# Patient Record
Sex: MALE | Race: WHITE | NOT HISPANIC OR LATINO | Employment: OTHER | URBAN - METROPOLITAN AREA
[De-identification: names, ages, dates, MRNs, and addresses within clinical notes are randomized per-mention and may not be internally consistent; named-entity substitution may affect disease eponyms.]

---

## 2017-03-09 ENCOUNTER — TELEPHONE (OUTPATIENT)
Dept: FAMILY MEDICINE CLINIC | Facility: CLINIC | Age: 67
End: 2017-03-09

## 2017-03-09 RX ORDER — TADALAFIL 20 MG/1
20 TABLET ORAL DAILY PRN
Qty: 90 TABLET | Refills: 6 | Status: SHIPPED | OUTPATIENT
Start: 2017-03-09 | End: 2017-06-20

## 2017-04-03 RX ORDER — TAMSULOSIN HYDROCHLORIDE 0.4 MG/1
CAPSULE ORAL
Qty: 30 CAPSULE | Refills: 0 | Status: SHIPPED | OUTPATIENT
Start: 2017-04-03 | End: 2017-06-20

## 2017-06-20 ENCOUNTER — OFFICE VISIT (OUTPATIENT)
Dept: FAMILY MEDICINE CLINIC | Facility: CLINIC | Age: 67
End: 2017-06-20

## 2017-06-20 VITALS
HEART RATE: 63 BPM | HEIGHT: 73 IN | SYSTOLIC BLOOD PRESSURE: 146 MMHG | DIASTOLIC BLOOD PRESSURE: 90 MMHG | OXYGEN SATURATION: 98 % | TEMPERATURE: 97.4 F | WEIGHT: 198 LBS | BODY MASS INDEX: 26.24 KG/M2

## 2017-06-20 DIAGNOSIS — M25.562 ACUTE PAIN OF LEFT KNEE: Primary | ICD-10-CM

## 2017-06-20 PROCEDURE — 99213 OFFICE O/P EST LOW 20 MIN: CPT | Performed by: PHYSICIAN ASSISTANT

## 2017-06-20 NOTE — PROGRESS NOTES
Subjective   Madi Blanco is a 67 y.o. male    Knee Pain    The incident occurred more than 1 week ago. The incident occurred at the gym. There was no injury mechanism. The pain is present in the left knee. The pain is at a severity of 4/10. The pain is mild. The pain has been improving since onset. Pertinent negatives include no inability to bear weight, loss of motion, loss of sensation, muscle weakness, numbness or tingling. The symptoms are aggravated by movement. He has tried NSAIDs for the symptoms. The treatment provided moderate relief.       The following portions of the patient's history were reviewed and updated as appropriate: allergies, current medications, past social history and problem list    Review of Systems   Constitutional: Negative for fatigue and unexpected weight change.   Respiratory: Negative for cough, chest tightness and shortness of breath.    Cardiovascular: Negative for chest pain, palpitations and leg swelling.   Gastrointestinal: Negative for nausea.   Skin: Negative for color change and rash.   Neurological: Negative for dizziness, tingling, syncope, weakness, numbness and headaches.       Objective     Vitals:    06/20/17 0851   BP: 146/90   Pulse: 63   Temp: 97.4 °F (36.3 °C)   SpO2: 98%       Physical Exam   Constitutional: He appears well-developed and well-nourished.   Neck: Neck supple. No JVD present. No thyromegaly present.   Cardiovascular: Normal rate, regular rhythm, normal heart sounds and intact distal pulses.    Pulmonary/Chest: Effort normal and breath sounds normal.   Abdominal: Soft. Bowel sounds are normal.   Musculoskeletal: He exhibits no edema.        Left knee: Tenderness found. Medial joint line and lateral joint line tenderness noted.   Lymphadenopathy:     He has no cervical adenopathy.   Neurological: No sensory deficit.   Skin: Skin is warm and dry. He is not diaphoretic.   Nursing note and vitals reviewed.      Assessment/Plan     Diagnoses and all  orders for this visit:    Acute pain of left knee    Other orders  -     Naproxen-Esomeprazole (VIMOVO) 500-20 MG tablet delayed-release; Take 500 mg by mouth 2 (Two) Times a Day.  Range of motion exercises given, patient was told ice in the evenings were knee brace if playing basketball or weightbearing exercise follow up if no better

## 2017-07-03 ENCOUNTER — TELEPHONE (OUTPATIENT)
Dept: FAMILY MEDICINE CLINIC | Facility: CLINIC | Age: 67
End: 2017-07-03

## 2017-07-03 DIAGNOSIS — M25.562 ACUTE PAIN OF LEFT KNEE: Primary | ICD-10-CM

## 2017-07-03 NOTE — TELEPHONE ENCOUNTER
----- Message from Jahaira Porter sent at 7/3/2017  2:12 PM EDT -----  Contact: PATIENT  Was in last week and saw Ike for left knee swollen. He said that it's still swollen almost as bad as the day he was here. Wanted to know if he needed to come back and see Ike again or if he wanted to order Xray or MRI. Please call him back and let him know what to do. Thank You.    [phone]

## 2017-07-06 ENCOUNTER — HOSPITAL ENCOUNTER (OUTPATIENT)
Dept: GENERAL RADIOLOGY | Facility: HOSPITAL | Age: 67
Discharge: HOME | End: 2017-07-06
Admitting: PHYSICIAN ASSISTANT

## 2017-07-06 DIAGNOSIS — M25.562 ACUTE PAIN OF LEFT KNEE: ICD-10-CM

## 2017-07-06 PROCEDURE — 73562 X-RAY EXAM OF KNEE 3: CPT

## 2017-07-10 ENCOUNTER — TELEPHONE (OUTPATIENT)
Dept: FAMILY MEDICINE CLINIC | Facility: CLINIC | Age: 67
End: 2017-07-10

## 2017-07-10 NOTE — TELEPHONE ENCOUNTER
Patient called about XR results of knee. Per Ike, he has arthritis and could benefit from steroid shot. Pt informed and scheduled an appointment for this week.

## 2017-07-12 ENCOUNTER — OFFICE VISIT (OUTPATIENT)
Dept: FAMILY MEDICINE CLINIC | Facility: CLINIC | Age: 67
End: 2017-07-12

## 2017-07-12 VITALS
WEIGHT: 200 LBS | SYSTOLIC BLOOD PRESSURE: 162 MMHG | TEMPERATURE: 97.7 F | RESPIRATION RATE: 14 BRPM | HEIGHT: 73 IN | BODY MASS INDEX: 26.51 KG/M2 | DIASTOLIC BLOOD PRESSURE: 88 MMHG | HEART RATE: 70 BPM | OXYGEN SATURATION: 98 %

## 2017-07-12 DIAGNOSIS — M25.562 ACUTE PAIN OF LEFT KNEE: Primary | ICD-10-CM

## 2017-07-12 PROCEDURE — 99212 OFFICE O/P EST SF 10 MIN: CPT | Performed by: PHYSICIAN ASSISTANT

## 2017-07-12 PROCEDURE — 96372 THER/PROPH/DIAG INJ SC/IM: CPT | Performed by: PHYSICIAN ASSISTANT

## 2017-07-12 RX ORDER — METHYLPREDNISOLONE ACETATE 40 MG/ML
40 INJECTION, SUSPENSION INTRA-ARTICULAR; INTRALESIONAL; INTRAMUSCULAR; SOFT TISSUE ONCE
Status: COMPLETED | OUTPATIENT
Start: 2017-07-12 | End: 2017-07-12

## 2017-07-12 RX ADMIN — METHYLPREDNISOLONE ACETATE 40 MG: 40 INJECTION, SUSPENSION INTRA-ARTICULAR; INTRALESIONAL; INTRAMUSCULAR; SOFT TISSUE at 16:16

## 2017-07-13 NOTE — PROGRESS NOTES
Subjective   Madi Blanco is a 67 y.o. male    Knee Pain    Pertinent negatives include no numbness.   Patient is a 67-year-old white male comes in left knee pain, patient has pain with flexion and extension x-rays the left knee showed anterior compartment syndrome patient has pain when sitting standing for long periods of time and will swelling.  Denies any direct injury or trauma to left knee.    The following portions of the patient's history were reviewed and updated as appropriate: allergies, current medications, past social history and problem list    Review of Systems   Constitutional: Negative for appetite change, diaphoresis, fatigue and unexpected weight change.   Eyes: Negative for visual disturbance.   Respiratory: Negative for cough, chest tightness and shortness of breath.    Cardiovascular: Negative for chest pain, palpitations and leg swelling.   Gastrointestinal: Negative for diarrhea, nausea and vomiting.   Endocrine: Negative for polydipsia, polyphagia and polyuria.   Musculoskeletal:        LEFT  KNEE PAIN   Skin: Negative for color change and rash.   Neurological: Negative for dizziness, syncope, weakness, light-headedness, numbness and headaches.       Objective     Vitals:    07/12/17 1425   BP: 162/88   Pulse: 70   Resp: 14   Temp: 97.7 °F (36.5 °C)   SpO2: 98%       Physical Exam   Constitutional: He appears well-developed and well-nourished.   Neck: Neck supple. No JVD present. No thyromegaly present.   Cardiovascular: Normal rate, regular rhythm, normal heart sounds, intact distal pulses and normal pulses.    No murmur heard.  Pulmonary/Chest: Effort normal and breath sounds normal. No respiratory distress.   Abdominal: Soft. Bowel sounds are normal. There is no hepatosplenomegaly. There is no tenderness.   Musculoskeletal: He exhibits no edema.        Legs:  Lymphadenopathy:     He has no cervical adenopathy.   Neurological: No sensory deficit.   Skin: Skin is warm and dry. He is not  diaphoretic.   Nursing note and vitals reviewed.      Assessment/Plan     Diagnoses and all orders for this visit:    Acute pain of left knee  -     methylPREDNISolone acetate (DEPO-medrol) injection 40 mg; Inject 1 mL into the shoulder, thigh, or buttocks 1 (One) Time.    Follow-up after one month to recheck knee

## 2017-10-02 RX ORDER — TAMSULOSIN HYDROCHLORIDE 0.4 MG/1
CAPSULE ORAL
Qty: 30 CAPSULE | Refills: 5 | Status: SHIPPED | OUTPATIENT
Start: 2017-10-02 | End: 2018-10-18 | Stop reason: SDUPTHER

## 2018-10-18 ENCOUNTER — OFFICE VISIT (OUTPATIENT)
Dept: FAMILY MEDICINE CLINIC | Facility: CLINIC | Age: 68
End: 2018-10-18

## 2018-10-18 ENCOUNTER — TELEPHONE (OUTPATIENT)
Dept: FAMILY MEDICINE CLINIC | Facility: CLINIC | Age: 68
End: 2018-10-18

## 2018-10-18 ENCOUNTER — LAB (OUTPATIENT)
Dept: LAB | Facility: HOSPITAL | Age: 68
End: 2018-10-18

## 2018-10-18 VITALS
WEIGHT: 204.2 LBS | SYSTOLIC BLOOD PRESSURE: 162 MMHG | RESPIRATION RATE: 16 BRPM | TEMPERATURE: 97.8 F | BODY MASS INDEX: 27.06 KG/M2 | DIASTOLIC BLOOD PRESSURE: 80 MMHG | HEIGHT: 73 IN | HEART RATE: 63 BPM | OXYGEN SATURATION: 98 %

## 2018-10-18 DIAGNOSIS — Z23 FLU VACCINE NEED: ICD-10-CM

## 2018-10-18 DIAGNOSIS — Z00.00 MEDICARE ANNUAL WELLNESS VISIT, INITIAL: ICD-10-CM

## 2018-10-18 DIAGNOSIS — Z00.00 ROUTINE GENERAL MEDICAL EXAMINATION AT A HEALTH CARE FACILITY: Primary | ICD-10-CM

## 2018-10-18 DIAGNOSIS — Z00.00 ROUTINE GENERAL MEDICAL EXAMINATION AT A HEALTH CARE FACILITY: ICD-10-CM

## 2018-10-18 LAB
ALBUMIN SERPL-MCNC: 4.6 G/DL (ref 3.2–4.8)
ALBUMIN/GLOB SERPL: 2.1 G/DL (ref 1.5–2.5)
ALP SERPL-CCNC: 93 U/L (ref 25–100)
ALT SERPL W P-5'-P-CCNC: 49 U/L (ref 7–40)
ANION GAP SERPL CALCULATED.3IONS-SCNC: 5 MMOL/L (ref 3–11)
ARTICHOKE IGE QN: 163 MG/DL (ref 0–130)
AST SERPL-CCNC: 35 U/L (ref 0–33)
BASOPHILS # BLD AUTO: 0.06 10*3/MM3 (ref 0–0.2)
BASOPHILS NFR BLD AUTO: 1.2 % (ref 0–1)
BILIRUB SERPL-MCNC: 0.7 MG/DL (ref 0.3–1.2)
BUN BLD-MCNC: 13 MG/DL (ref 9–23)
BUN/CREAT SERPL: 13 (ref 7–25)
CALCIUM SPEC-SCNC: 9.5 MG/DL (ref 8.7–10.4)
CHLORIDE SERPL-SCNC: 102 MMOL/L (ref 99–109)
CHOLEST SERPL-MCNC: 227 MG/DL (ref 0–200)
CO2 SERPL-SCNC: 30 MMOL/L (ref 20–31)
CREAT BLD-MCNC: 1 MG/DL (ref 0.6–1.3)
DEPRECATED RDW RBC AUTO: 45.8 FL (ref 37–54)
EOSINOPHIL # BLD AUTO: 0.2 10*3/MM3 (ref 0–0.3)
EOSINOPHIL NFR BLD AUTO: 3.9 % (ref 0–3)
ERYTHROCYTE [DISTWIDTH] IN BLOOD BY AUTOMATED COUNT: 13 % (ref 11.3–14.5)
GFR SERPL CREATININE-BSD FRML MDRD: 75 ML/MIN/1.73
GLOBULIN UR ELPH-MCNC: 2.2 GM/DL
GLUCOSE BLD-MCNC: 94 MG/DL (ref 70–100)
HCT VFR BLD AUTO: 49.7 % (ref 38.9–50.9)
HCV AB SER DONR QL: NORMAL
HDLC SERPL-MCNC: 49 MG/DL (ref 40–60)
HGB BLD-MCNC: 16.7 G/DL (ref 13.1–17.5)
IMM GRANULOCYTES # BLD: 0.02 10*3/MM3 (ref 0–0.03)
IMM GRANULOCYTES NFR BLD: 0.4 % (ref 0–0.6)
LYMPHOCYTES # BLD AUTO: 1.44 10*3/MM3 (ref 0.6–4.8)
LYMPHOCYTES NFR BLD AUTO: 27.9 % (ref 24–44)
MCH RBC QN AUTO: 32.2 PG (ref 27–31)
MCHC RBC AUTO-ENTMCNC: 33.6 G/DL (ref 32–36)
MCV RBC AUTO: 95.9 FL (ref 80–99)
MONOCYTES # BLD AUTO: 0.53 10*3/MM3 (ref 0–1)
MONOCYTES NFR BLD AUTO: 10.3 % (ref 0–12)
NEUTROPHILS # BLD AUTO: 2.93 10*3/MM3 (ref 1.5–8.3)
NEUTROPHILS NFR BLD AUTO: 56.7 % (ref 41–71)
PLATELET # BLD AUTO: 196 10*3/MM3 (ref 150–450)
PMV BLD AUTO: 10.2 FL (ref 6–12)
POTASSIUM BLD-SCNC: 4.6 MMOL/L (ref 3.5–5.5)
PROT SERPL-MCNC: 6.8 G/DL (ref 5.7–8.2)
RBC # BLD AUTO: 5.18 10*6/MM3 (ref 4.2–5.76)
SODIUM BLD-SCNC: 137 MMOL/L (ref 132–146)
TRIGL SERPL-MCNC: 133 MG/DL (ref 0–150)
TSH SERPL DL<=0.05 MIU/L-ACNC: 2.1 MIU/ML (ref 0.35–5.35)
WBC NRBC COR # BLD: 5.16 10*3/MM3 (ref 3.5–10.8)

## 2018-10-18 PROCEDURE — 85025 COMPLETE CBC W/AUTO DIFF WBC: CPT

## 2018-10-18 PROCEDURE — G0438 PPPS, INITIAL VISIT: HCPCS | Performed by: PHYSICIAN ASSISTANT

## 2018-10-18 PROCEDURE — 80061 LIPID PANEL: CPT

## 2018-10-18 PROCEDURE — 36415 COLL VENOUS BLD VENIPUNCTURE: CPT

## 2018-10-18 PROCEDURE — 84443 ASSAY THYROID STIM HORMONE: CPT

## 2018-10-18 PROCEDURE — 86803 HEPATITIS C AB TEST: CPT

## 2018-10-18 PROCEDURE — 80053 COMPREHEN METABOLIC PANEL: CPT

## 2018-10-18 RX ORDER — ASPIRIN 81 MG/1
81 TABLET ORAL DAILY
COMMUNITY
End: 2019-08-28

## 2018-10-18 RX ORDER — TAMSULOSIN HYDROCHLORIDE 0.4 MG/1
1 CAPSULE ORAL DAILY
Qty: 30 CAPSULE | Refills: 5 | Status: SHIPPED | OUTPATIENT
Start: 2018-10-18 | End: 2019-07-05 | Stop reason: SDUPTHER

## 2018-10-18 RX ORDER — LORATADINE 10 MG/1
10 TABLET ORAL DAILY
COMMUNITY
End: 2022-01-17

## 2018-10-18 RX ORDER — FLUTICASONE PROPIONATE 50 MCG
2 SPRAY, SUSPENSION (ML) NASAL DAILY
COMMUNITY
End: 2022-12-16

## 2018-10-18 NOTE — TELEPHONE ENCOUNTER
----- Message from Janae Thompson sent at 10/18/2018 11:04 AM EDT -----  Contact: PT.   PT. SEE'S CASEY.  PT. IS NEEDING A REFILL ON HIS: tamsulosin (FLOMAX) 0.4 MG capsule 24 hr capsule 30 capsule 5 10/2/2017    Sig: TAKE ONE CAPSULE BY MOUTH EVERY DAY   Sent to pharmacy as: TAMSULOSIN 0.4 MG PO capsule 24 hr capsule     RX=[facility] [address] [place], [place] [address]ON [facility] - [phone] [facility] [phone]  [phone] (Phone)  [phone] (Fax)    PT. CAN BE REACHED @ [facility] #.

## 2018-10-18 NOTE — PROGRESS NOTES
QUICK REFERENCE INFORMATION:  The ABCs of the Annual Wellness Visit    Initial Medicare Wellness Visit    HEALTH RISK ASSESSMENT    1950    Recent Hospitalizations:  No hospitalization(s) within the last year..        Current Medical Providers:  Patient Care Team:  Bharathi Bryan MD as PCP - General (Family Medicine)        Smoking Status:  History   Smoking Status   • Former Smoker   • Quit date: 1977   Smokeless Tobacco   • Never Used       Alcohol Consumption:  History   Alcohol Use   • Yes     Comment: Occ       Depression Screen:   PHQ-2/PHQ-9 Depression Screening 10/18/2018   Little interest or pleasure in doing things 0   Feeling down, depressed, or hopeless 0   Total Score 0       Health Habits and Functional and Cognitive Screening:  Functional & Cognitive Status 10/18/2018   Do you have difficulty preparing food and eating? No   Do you have difficulty bathing yourself, getting dressed or grooming yourself? No   Do you have difficulty using the toilet? No   Do you have difficulty moving around from place to place? No   Do you have trouble with steps or getting out of a bed or a chair? No   In the past year have you fallen or experienced a near fall? No   Current Diet Unhealthy Diet   Dental Exam Up to date   Eye Exam Not up to date   Exercise (times per week) 0 times per week   Current Exercise Activities Include None   Do you need help using the phone?  No   Are you deaf or do you have serious difficulty hearing?  No   Do you need help with transportation? No   Do you need help shopping? No   Do you need help preparing meals?  No   Do you need help with housework?  No   Do you need help with laundry? No   Do you need help taking your medications? No   Do you need help managing money? No   Do you ever drive or ride in a car without wearing a seat belt? No   Have you felt unusual stress, anger or loneliness in the last month? No   Who do you live with? Spouse   If you need help, do you have  trouble finding someone available to you? No   Have you been bothered in the last four weeks by sexual problems? No   Do you have difficulty concentrating, remembering or making decisions? No           Does the patient have evidence of cognitive impairment? Yes    Asiprin use counseling: Taking ASA appropriately as indicated      Recent Lab Results:    Visual Acuity:  No exam data present    Age-appropriate Screening Schedule:  Refer to the list below for future screening recommendations based on patient's age, sex and/or medical conditions. Orders for these recommended tests are listed in the plan section. The patient has been provided with a written plan.    Health Maintenance   Topic Date Due   • TDAP/TD VACCINES (1 - Tdap) 03/02/1971   • ZOSTER VACCINE (1 of 2) 03/02/2002   • PNEUMOCOCCAL VACCINES (65+ LOW/MEDIUM RISK) (1 of 2 - PCV13) 03/02/2017   • COLONOSCOPY  12/27/2026   • INFLUENZA VACCINE  Completed        Subjective   History of Present Illness    Madi Blanco is a 68 y.o. male who presents for an Annual Wellness Visit.    The following portions of the patient's history were reviewed and updated as appropriate: allergies, current medications, past family history, past medical history, past social history, past surgical history and problem list.    Outpatient Medications Prior to Visit   Medication Sig Dispense Refill   • tamsulosin (FLOMAX) 0.4 MG capsule 24 hr capsule TAKE ONE CAPSULE BY MOUTH EVERY DAY 30 capsule 5     No facility-administered medications prior to visit.        There is no problem list on file for this patient.      Advance Care Planning:  has NO advance directive - information provided to the patient today    Identification of Risk Factors:  Risk factors include: weight  and cardiovascular risk.    Review of Systems   Constitutional: Negative.    HENT: Negative.    Eyes: Negative.    Respiratory: Negative.    Cardiovascular: Negative.    Gastrointestinal: Negative.    Endocrine:  "Negative.    Genitourinary: Negative.    Musculoskeletal: Negative.    Skin: Negative.    Allergic/Immunologic: Negative.    Neurological: Negative.    Hematological: Negative.    Psychiatric/Behavioral: Negative.    All other systems reviewed and are negative.      Compared to one year ago, the patient feels his physical health is better.  Compared to one year ago, the patient feels his mental health is better.    Objective     Physical Exam   Constitutional: He is oriented to person, place, and time. He appears well-developed and well-nourished.   HENT:   Head: Normocephalic and atraumatic.   Right Ear: External ear normal.   Left Ear: External ear normal.   Nose: Nose normal.   Mouth/Throat: Oropharynx is clear and moist.   Eyes: Pupils are equal, round, and reactive to light. Conjunctivae and EOM are normal.   Neck: Normal range of motion. Neck supple. No thyromegaly present.   Cardiovascular: Normal rate, regular rhythm, normal heart sounds and intact distal pulses.    No murmur heard.  Pulmonary/Chest: Effort normal and breath sounds normal.   Abdominal: Soft. Bowel sounds are normal. He exhibits no mass. There is no tenderness.   Genitourinary: Rectum normal, prostate normal and penis normal.   Musculoskeletal: Normal range of motion. He exhibits no edema.   Neurological: He is alert and oriented to person, place, and time. He has normal reflexes. No cranial nerve deficit.   Skin: Skin is warm and dry.   Psychiatric: He has a normal mood and affect.       Vitals:    10/18/18 0946   BP: 162/80   Pulse: 63   Resp: 16   Temp: 97.8 °F (36.6 °C)   TempSrc: Oral   SpO2: 98%   Weight: 92.6 kg (204 lb 3.2 oz)   Height: 185.4 cm (72.99\")   PainSc:   4   PainLoc: Shoulder       Patient's Body mass index is 26.95 kg/m². BMI is within normal parameters. No follow-up required.      Assessment/Plan   Patient Self-Management and Personalized Health Advice  The patient has been provided with information about: diet and " weight management and preventive services including:   · Advance directive, Influenza vaccine, Zostavax vaccine (Herpes Zoster).    Visit Diagnoses:    ICD-10-CM ICD-9-CM   1. Routine general medical examination at a health care facility Z00.00 V70.0       No orders of the defined types were placed in this encounter.      Outpatient Encounter Prescriptions as of 10/18/2018   Medication Sig Dispense Refill   • aspirin 81 MG EC tablet Take 81 mg by mouth Daily.     • fluticasone (FLONASE) 50 MCG/ACT nasal spray 2 sprays into the nostril(s) as directed by provider Daily.     • loratadine (CLARITIN) 10 MG tablet Take 10 mg by mouth Daily.     • tamsulosin (FLOMAX) 0.4 MG capsule 24 hr capsule TAKE ONE CAPSULE BY MOUTH EVERY DAY 30 capsule 5     No facility-administered encounter medications on file as of 10/18/2018.        Reviewed use of high risk medication in the elderly: yes  Reviewed for potential of harmful drug interactions in the elderly: yes    Follow Up:  No Follow-up on file.     An After Visit Summary and PPPS with all of these plans were given to the patient.

## 2018-11-21 ENCOUNTER — OFFICE VISIT (OUTPATIENT)
Dept: FAMILY MEDICINE CLINIC | Facility: CLINIC | Age: 68
End: 2018-11-21

## 2018-11-21 VITALS
OXYGEN SATURATION: 98 % | SYSTOLIC BLOOD PRESSURE: 156 MMHG | DIASTOLIC BLOOD PRESSURE: 80 MMHG | BODY MASS INDEX: 27.57 KG/M2 | HEIGHT: 73 IN | RESPIRATION RATE: 16 BRPM | WEIGHT: 208 LBS | HEART RATE: 65 BPM

## 2018-11-21 DIAGNOSIS — N40.0 BENIGN PROSTATIC HYPERPLASIA WITHOUT LOWER URINARY TRACT SYMPTOMS: Primary | ICD-10-CM

## 2018-11-21 DIAGNOSIS — L57.0 ACTINIC KERATOSIS: ICD-10-CM

## 2018-11-21 DIAGNOSIS — N52.9 ERECTILE DYSFUNCTION, UNSPECIFIED ERECTILE DYSFUNCTION TYPE: ICD-10-CM

## 2018-11-21 PROCEDURE — 17000 DESTRUCT PREMALG LESION: CPT | Performed by: PHYSICIAN ASSISTANT

## 2018-11-21 PROCEDURE — 99213 OFFICE O/P EST LOW 20 MIN: CPT | Performed by: PHYSICIAN ASSISTANT

## 2018-11-21 NOTE — PROGRESS NOTES
Subjective   Madi Blanco is a 68 y.o. male  Benign Prostatic Hypertrophy (F/U since starting Flomax. ) and Erectile Dysfunction (Req new script for sildenafil-wants to inc to 50mg)      History of Present Illness  Patient is a 68-year-old white male comes in complaining of BPH, needs refill of Flomax meds working improved about 50% having ED symptoms trouble with erection.  Trouble getting keeping an erection takes generic Viagra    Patient claims of scaly lesion on left arm noticed lesion about 3 weeks ago lesion not painful      The following portions of the patient's history were reviewed and updated as appropriate: allergies, current medications, past social history and problem list    Review of Systems   Constitutional: Negative for appetite change, diaphoresis, fatigue and unexpected weight change.   Eyes: Negative for visual disturbance.   Respiratory: Negative for cough, chest tightness and shortness of breath.    Cardiovascular: Negative for chest pain, palpitations and leg swelling.   Gastrointestinal: Negative for diarrhea, nausea and vomiting.   Endocrine: Negative for polydipsia, polyphagia and polyuria.   Skin: Negative for color change and rash.        Scaly lesion   Neurological: Negative for dizziness, syncope, weakness, light-headedness, numbness and headaches.       Objective     Vitals:    11/21/18 0951   BP: 156/80   Pulse: 65   Resp: 16   SpO2: 98%       Physical Exam   Constitutional: He appears well-developed and well-nourished.   Neck: Neck supple. No JVD present. No thyromegaly present.   Cardiovascular: Normal rate, regular rhythm, normal heart sounds, intact distal pulses and normal pulses.   No murmur heard.  Pulmonary/Chest: Effort normal and breath sounds normal. No respiratory distress.   Abdominal: Soft. Bowel sounds are normal. There is no hepatosplenomegaly. There is no tenderness.   Musculoskeletal: He exhibits no edema.   Lymphadenopathy:     He has no cervical adenopathy.    Neurological: No sensory deficit.   Skin: Skin is warm and dry. He is not diaphoretic.        Nursing note and vitals reviewed.      Assessment/Plan     Diagnoses and all orders for this visit:    Benign prostatic hyperplasia without lower urinary tract symptoms    Erectile dysfunction, unspecified erectile dysfunction type    #1 Flomax 0.4 mg 1 by mouth everyday dispense 30    #2 Viagra 25 mg 1-2 by mouth every 6 hours when necessary erectile dysfunction    #3 lesion frozen with liquid nitrogen

## 2018-11-26 ENCOUNTER — TELEPHONE (OUTPATIENT)
Dept: FAMILY MEDICINE CLINIC | Facility: CLINIC | Age: 68
End: 2018-11-26

## 2018-11-26 RX ORDER — SILDENAFIL CITRATE 20 MG/1
20 TABLET ORAL NIGHTLY
Qty: 9 TABLET | Refills: 11 | Status: SHIPPED | OUTPATIENT
Start: 2018-11-26 | End: 2021-10-13

## 2018-11-26 NOTE — TELEPHONE ENCOUNTER
Pt has been on this in the past, but most recently was cialis.  Do you want to refill his sildenafil?

## 2018-11-26 NOTE — TELEPHONE ENCOUNTER
----- Message from Mariela Wayne sent at 11/26/2018 10:25 AM EST -----  Contact: [facility] [phone]  SILDENAFIL 20 MG, REQUESTING REFILL  FAX # [phone]

## 2018-12-06 ENCOUNTER — TELEPHONE (OUTPATIENT)
Dept: FAMILY MEDICINE CLINIC | Facility: CLINIC | Age: 68
End: 2018-12-06

## 2018-12-06 NOTE — TELEPHONE ENCOUNTER
----- Message from Mariela Wayne sent at 12/6/2018 10:52 AM EST -----  Contact: [facility]  PHARMACY CHECKING STATUS ON PENDING RX REQUEST SINCE 11/26/18. DR CHELE BARTHED IT    Contact: [facility] [phone]  SILDENAFIL 20 MG, REQUESTING REFILL  FAX # [phone]

## 2018-12-06 NOTE — TELEPHONE ENCOUNTER
Spoke to [facility], she states the patient signed up for the home delivery. She will call and have the refills transferred from his local [facility] pharmacy

## 2019-07-08 RX ORDER — TAMSULOSIN HYDROCHLORIDE 0.4 MG/1
CAPSULE ORAL
Qty: 30 CAPSULE | Refills: 3 | Status: SHIPPED | OUTPATIENT
Start: 2019-07-08 | End: 2021-10-13 | Stop reason: SDUPTHER

## 2019-08-09 RX ORDER — TAMSULOSIN HYDROCHLORIDE 0.4 MG/1
CAPSULE ORAL
Qty: 30 CAPSULE | Refills: 5 | OUTPATIENT
Start: 2019-08-09

## 2019-08-13 RX ORDER — TAMSULOSIN HYDROCHLORIDE 0.4 MG/1
CAPSULE ORAL
Qty: 30 CAPSULE | Refills: 5 | Status: SHIPPED | OUTPATIENT
Start: 2019-08-13 | End: 2020-09-14 | Stop reason: SDUPTHER

## 2019-08-28 ENCOUNTER — OFFICE VISIT (OUTPATIENT)
Dept: FAMILY MEDICINE CLINIC | Facility: CLINIC | Age: 69
End: 2019-08-28

## 2019-08-28 VITALS
DIASTOLIC BLOOD PRESSURE: 96 MMHG | HEIGHT: 73 IN | OXYGEN SATURATION: 98 % | SYSTOLIC BLOOD PRESSURE: 154 MMHG | HEART RATE: 77 BPM | WEIGHT: 203 LBS | BODY MASS INDEX: 26.9 KG/M2 | RESPIRATION RATE: 16 BRPM

## 2019-08-28 DIAGNOSIS — J40 BRONCHITIS: Primary | ICD-10-CM

## 2019-08-28 PROCEDURE — 99213 OFFICE O/P EST LOW 20 MIN: CPT | Performed by: PHYSICIAN ASSISTANT

## 2019-08-28 RX ORDER — LEVOFLOXACIN 500 MG/1
500 TABLET, FILM COATED ORAL DAILY
Qty: 10 TABLET | Refills: 0 | Status: SHIPPED | OUTPATIENT
Start: 2019-08-28 | End: 2019-08-28 | Stop reason: SDUPTHER

## 2019-08-28 RX ORDER — PREDNISONE 20 MG/1
20 TABLET ORAL 2 TIMES DAILY
Qty: 14 TABLET | Refills: 0 | Status: SHIPPED | OUTPATIENT
Start: 2019-08-28 | End: 2019-08-28 | Stop reason: SDUPTHER

## 2019-08-28 RX ORDER — PREDNISONE 20 MG/1
20 TABLET ORAL 2 TIMES DAILY
Qty: 14 TABLET | Refills: 0 | Status: SHIPPED | OUTPATIENT
Start: 2019-08-28 | End: 2021-10-13

## 2019-08-28 RX ORDER — LEVOFLOXACIN 500 MG/1
500 TABLET, FILM COATED ORAL DAILY
Qty: 10 TABLET | Refills: 0 | Status: SHIPPED | OUTPATIENT
Start: 2019-08-28 | End: 2021-10-13

## 2019-08-28 NOTE — PROGRESS NOTES
Subjective   Madi Blanco is a 69 y.o. male  Cough (Seen in ED Aug 24 for URI and finished Zpak. Still using Flonase and Zyrtec but sx's haven't resolved. Allergic to PCN. )      Cough   This is a new problem. The cough is productive of purulent sputum, productive of brown sputum and productive of sputum. Associated symptoms include ear pain, headaches, postnasal drip, rhinorrhea and wheezing. Pertinent negatives include no chest pain, chills, fever, myalgias, sore throat or shortness of breath. The symptoms are aggravated by lying down. The treatment provided moderate relief.       The following portions of the patient's history were reviewed and updated as appropriate: allergies, current medications, past social history and problem list    Review of Systems   Constitutional: Negative for chills, fatigue and fever.   HENT: Positive for congestion, ear pain, postnasal drip, rhinorrhea and sinus pressure. Negative for sore throat.    Eyes: Positive for discharge and itching. Negative for pain.   Respiratory: Positive for cough, chest tightness and wheezing. Negative for shortness of breath.    Cardiovascular: Negative for chest pain.   Gastrointestinal: Negative.  Negative for nausea.   Genitourinary: Negative.    Musculoskeletal: Negative for myalgias.   Neurological: Positive for light-headedness and headaches. Negative for dizziness.   Hematological: Negative for adenopathy.   Psychiatric/Behavioral: Positive for sleep disturbance.       Objective     Vitals:    08/28/19 1307   BP: 154/96   Pulse: 77   Resp: 16   SpO2: 98%       Physical Exam   Constitutional: He appears well-developed and well-nourished. No distress.   HENT:   Head: Normocephalic and atraumatic.   Right Ear: Tympanic membrane and ear canal normal.   Left Ear: Tympanic membrane and ear canal normal.   Nose: Mucosal edema, rhinorrhea and sinus tenderness present. Right sinus exhibits maxillary sinus tenderness and frontal sinus tenderness. Left  sinus exhibits maxillary sinus tenderness and frontal sinus tenderness.   Mouth/Throat: Oropharynx is clear and moist. No oropharyngeal exudate.   Eyes: Pupils are equal, round, and reactive to light.   Neck: Neck supple. No JVD present.   Cardiovascular: Normal rate, regular rhythm and normal heart sounds.   No murmur heard.  Pulmonary/Chest: Effort normal and breath sounds normal. No stridor. No respiratory distress.   Musculoskeletal: He exhibits no edema.   Lymphadenopathy:     He has no cervical adenopathy.   Skin: He is not diaphoretic.   Nursing note and vitals reviewed.      Assessment/Plan     Diagnoses and all orders for this visit:    Bronchitis  -     levoFLOXacin (LEVAQUIN) 500 MG tablet; Take 1 tablet by mouth Daily.  -     predniSONE (DELTASONE) 20 MG tablet; Take 1 tablet by mouth 2 (Two) Times a Day.    Other orders  -     Discontinue: levoFLOXacin (LEVAQUIN) 500 MG tablet; Take 1 tablet by mouth Daily.  -     Discontinue: predniSONE (DELTASONE) 20 MG tablet; Take 1 tablet by mouth 2 (Two) Times a Day.    folllow up as needed

## 2020-07-31 ENCOUNTER — TELEPHONE (OUTPATIENT)
Dept: FAMILY MEDICINE CLINIC | Facility: CLINIC | Age: 70
End: 2020-07-31

## 2020-08-03 RX ORDER — SILDENAFIL 100 MG/1
100 TABLET, FILM COATED ORAL DAILY PRN
Qty: 20 TABLET | Refills: 0 | Status: SHIPPED | OUTPATIENT
Start: 2020-08-03 | End: 2022-01-17

## 2020-09-14 RX ORDER — TAMSULOSIN HYDROCHLORIDE 0.4 MG/1
1 CAPSULE ORAL DAILY
Qty: 90 CAPSULE | Refills: 1 | Status: SHIPPED | OUTPATIENT
Start: 2020-09-14 | End: 2021-06-08 | Stop reason: SDUPTHER

## 2020-09-14 RX ORDER — TAMSULOSIN HYDROCHLORIDE 0.4 MG/1
CAPSULE ORAL
Qty: 30 CAPSULE | Refills: 2 | OUTPATIENT
Start: 2020-09-14

## 2020-09-14 NOTE — TELEPHONE ENCOUNTER
Caller: Madi Blanco    Relationship: Self    Best call back number:[phone]     Medication needed:   Requested Prescriptions     Pending Prescriptions Disp Refills   • tamsulosin (FLOMAX) 0.4 MG capsule 24 hr capsule 30 capsule 5     Sig: Take 1 capsule by mouth Daily.       When do you need the refill by: TODAY    What details did the patient provide when requesting the medication: PT IS OUT OF MEDICATION    Does the patient have less than a 3 day supply:  [x] Yes  [] No    What is the patient's preferred pharmacy: [facility] - [place], [place] - [address] [address] - [phone]  - [phone]

## 2021-06-08 RX ORDER — TAMSULOSIN HYDROCHLORIDE 0.4 MG/1
CAPSULE ORAL
Qty: 30 CAPSULE | Refills: 0 | Status: SHIPPED | OUTPATIENT
Start: 2021-06-08 | End: 2021-07-01

## 2021-07-02 RX ORDER — TAMSULOSIN HYDROCHLORIDE 0.4 MG/1
1 CAPSULE ORAL DAILY
Qty: 30 CAPSULE | Refills: 0 | Status: SHIPPED | OUTPATIENT
Start: 2021-07-02 | End: 2021-10-13 | Stop reason: SDUPTHER

## 2021-09-03 RX ORDER — TAMSULOSIN HYDROCHLORIDE 0.4 MG/1
1 CAPSULE ORAL DAILY
Qty: 30 CAPSULE | Refills: 0 | OUTPATIENT
Start: 2021-09-03

## 2021-09-29 RX ORDER — TAMSULOSIN HYDROCHLORIDE 0.4 MG/1
1 CAPSULE ORAL DAILY
Qty: 30 CAPSULE | Refills: 0 | OUTPATIENT
Start: 2021-09-29

## 2021-10-13 ENCOUNTER — OFFICE VISIT (OUTPATIENT)
Dept: FAMILY MEDICINE CLINIC | Facility: CLINIC | Age: 71
End: 2021-10-13

## 2021-10-13 VITALS
BODY MASS INDEX: 26.06 KG/M2 | WEIGHT: 196.6 LBS | DIASTOLIC BLOOD PRESSURE: 80 MMHG | HEART RATE: 81 BPM | HEIGHT: 73 IN | OXYGEN SATURATION: 98 % | SYSTOLIC BLOOD PRESSURE: 122 MMHG | RESPIRATION RATE: 15 BRPM | TEMPERATURE: 96.9 F

## 2021-10-13 DIAGNOSIS — Z00.00 MEDICARE ANNUAL WELLNESS VISIT, SUBSEQUENT: Primary | ICD-10-CM

## 2021-10-13 DIAGNOSIS — N40.0 BENIGN PROSTATIC HYPERPLASIA WITHOUT LOWER URINARY TRACT SYMPTOMS: ICD-10-CM

## 2021-10-13 DIAGNOSIS — E78.00 ELEVATED CHOLESTEROL: ICD-10-CM

## 2021-10-13 DIAGNOSIS — Z23 NEED FOR VACCINATION: ICD-10-CM

## 2021-10-13 DIAGNOSIS — Z51.81 MEDICATION MONITORING ENCOUNTER: ICD-10-CM

## 2021-10-13 DIAGNOSIS — L57.0 SOLAR KERATOSIS: ICD-10-CM

## 2021-10-13 DIAGNOSIS — Z12.5 PROSTATE CANCER SCREENING: ICD-10-CM

## 2021-10-13 PROCEDURE — 1159F MED LIST DOCD IN RCRD: CPT | Performed by: FAMILY MEDICINE

## 2021-10-13 PROCEDURE — 1170F FXNL STATUS ASSESSED: CPT | Performed by: FAMILY MEDICINE

## 2021-10-13 PROCEDURE — G0439 PPPS, SUBSEQ VISIT: HCPCS | Performed by: FAMILY MEDICINE

## 2021-10-13 RX ORDER — TAMSULOSIN HYDROCHLORIDE 0.4 MG/1
1 CAPSULE ORAL DAILY
Qty: 90 CAPSULE | Refills: 3 | Status: SHIPPED | OUTPATIENT
Start: 2021-10-13 | End: 2022-03-29

## 2021-10-14 NOTE — PROGRESS NOTES
The ABCs of the Annual Wellness Visit  Subsequent Medicare Wellness Visit    Chief Complaint   Patient presents with   • Medicare Wellness-subsequent      Subjective   History of Present Illness:  Madi Blanco is a 71 y.o. male who presents for a Subsequent Medicare Wellness Visit.    The following portions of the patient's history were reviewed and   updated as appropriate: allergies, current medications, past family history, past medical history, past social history, past surgical history and problem list.    Compared to one year ago, the patient feels his physical   health is the same.    Compared to one year ago, the patient feels his mental   health is the same.    Recent Hospitalizations:  He was not admitted to the hospital during the last year.       Current Medical Providers:  Patient Care Team:  Bharathi Bryan MD as PCP - General (Family Medicine)    Outpatient Medications Prior to Visit   Medication Sig Dispense Refill   • fluticasone (FLONASE) 50 MCG/ACT nasal spray 2 sprays into the nostril(s) as directed by provider Daily.     • loratadine (CLARITIN) 10 MG tablet Take 10 mg by mouth Daily.     • sildenafil (Viagra) 100 MG tablet Take 1 tablet by mouth Daily As Needed for Erectile Dysfunction. 20 tablet 0   • tamsulosin (FLOMAX) 0.4 MG capsule 24 hr capsule Take 1 capsule by mouth once daily 30 capsule 3   • levoFLOXacin (LEVAQUIN) 500 MG tablet Take 1 tablet by mouth Daily. 10 tablet 0   • predniSONE (DELTASONE) 20 MG tablet Take 1 tablet by mouth 2 (Two) Times a Day. 14 tablet 0   • sildenafil (REVATIO) 20 MG tablet Take 1 tablet by mouth Every Night. 9 tablet 11   • tamsulosin (FLOMAX) 0.4 MG capsule 24 hr capsule Take 1 capsule by mouth Daily. MUST HAVE APPT FOR REFILLS 30 capsule 0     No facility-administered medications prior to visit.       No opioid medication identified on active medication list. I have reviewed chart for other potential  high risk medication/s and harmful drug  "interactions in the elderly.          Aspirin is not on active medication list.  Aspirin use is not indicated based on review of current medical condition/s. Risk of harm outweighs potential benefits.  .    Patient Active Problem List   Diagnosis   • Benign prostatic hyperplasia without lower urinary tract symptoms     Advance Care Planning  has NO advanced directive - not interested in additional information    Review of Systems      Objective      Vitals:    10/13/21 1407   BP: 122/80   Pulse: 81   Resp: 15   Temp: 96.9 °F (36.1 °C)   SpO2: 98%   Weight: 89.2 kg (196 lb 9.6 oz)   Height: 185.4 cm (73\")   PainSc:   3   PainLoc: Leg  Comment: right     BMI Readings from Last 1 Encounters:   10/13/21 25.94 kg/m²   BMI is not above normal parameters.     Does the patient have evidence of cognitive impairment? No    Physical Exam            HEALTH RISK ASSESSMENT    Smoking Status:  Social History     Tobacco Use   Smoking Status Former Smoker   • Types: Cigarettes   • Quit date:    • Years since quittin.8   Smokeless Tobacco Never Used     Alcohol Consumption:  Social History     Substance and Sexual Activity   Alcohol Use Yes    Comment: Occ     Fall Risk Screen:    STEADI Fall Risk Assessment was completed, and patient is at HIGH risk for falls. Assessment completed on:10/13/2021    Depression Screening:  PHQ-2/PHQ-9 Depression Screening 10/18/2018   Little interest or pleasure in doing things 0   Feeling down, depressed, or hopeless 0   Total Score 0       Health Habits and Functional and Cognitive Screening:  Functional & Cognitive Status 10/13/2021   Do you have difficulty preparing food and eating? No   Do you have difficulty bathing yourself, getting dressed or grooming yourself? No   Do you have difficulty using the toilet? No   Do you have difficulty moving around from place to place? No   Do you have trouble with steps or getting out of a bed or a chair? No   Current Diet Well Balanced Diet   Dental " Exam Up to date   Eye Exam Up to date   Exercise (times per week) 7 times per week   Current Exercises Include Yard Work;House Cleaning   Current Exercise Activities Include -   Do you need help using the phone?  No   Are you deaf or do you have serious difficulty hearing?  No   Do you need help with transportation? No   Do you need help shopping? No   Do you need help preparing meals?  No   Do you need help with housework?  No   Do you need help with laundry? No   Do you need help taking your medications? No   Do you need help managing money? No   Do you ever drive or ride in a car without wearing a seat belt? No   Have you felt unusual stress, anger or loneliness in the last month? No   Who do you live with? Spouse   If you need help, do you have trouble finding someone available to you? No   Have you been bothered in the last four weeks by sexual problems? No   Do you have difficulty concentrating, remembering or making decisions? No       Age-appropriate Screening Schedule:  Refer to the list below for future screening recommendations based on patient's age, sex and/or medical conditions. Orders for these recommended tests are listed in the plan section. The patient has been provided with a written plan.    Health Maintenance   Topic Date Due   • TDAP/TD VACCINES (1 - Tdap) Never done   • ZOSTER VACCINE (1 of 2) Never done   • LIPID PANEL  10/13/2021   • INFLUENZA VACCINE  Completed              Assessment/Plan     CMS Preventative Services Quick Reference  Risk Factors Identified During Encounter  Immunizations Discussed/Encouraged (specific Immunizations; Pneumococcal 23 and Shingrix  The above risks/problems have been discussed with the patient.  Follow up actions/plans if indicated are seen below in the Assessment/Plan Section.  Pertinent information has been shared with the patient in the After Visit Summary.    Diagnoses and all orders for this visit:    1. Medicare annual wellness visit, subsequent  (Primary)    2. Benign prostatic hyperplasia without lower urinary tract symptoms  -     tamsulosin (FLOMAX) 0.4 MG capsule 24 hr capsule; Take 1 capsule by mouth Daily.  Dispense: 90 capsule; Refill: 3  -     CBC (No Diff); Future  -     Comprehensive Metabolic Panel; Future    3. Solar keratosis  -     Ambulatory Referral to Dermatology    4. Prostate cancer screening  -     PSA Screen; Future    5. Medication monitoring encounter  -     CBC (No Diff); Future  -     Comprehensive Metabolic Panel; Future    6. Elevated cholesterol  -     Comprehensive Metabolic Panel; Future  -     Lipid Panel; Future    7. Need for vaccination  -     Fluzone High-Dose 65+yrs ([id number])        Follow Up:  No follow-ups on file.     An After Visit Summary and PPPS were given to the patient.

## 2021-10-22 ENCOUNTER — LAB (OUTPATIENT)
Dept: LAB | Facility: HOSPITAL | Age: 71
End: 2021-10-22

## 2021-10-22 DIAGNOSIS — Z12.5 PROSTATE CANCER SCREENING: ICD-10-CM

## 2021-10-22 DIAGNOSIS — E78.00 ELEVATED CHOLESTEROL: ICD-10-CM

## 2021-10-22 DIAGNOSIS — Z51.81 MEDICATION MONITORING ENCOUNTER: ICD-10-CM

## 2021-10-22 DIAGNOSIS — N40.0 BENIGN PROSTATIC HYPERPLASIA WITHOUT LOWER URINARY TRACT SYMPTOMS: ICD-10-CM

## 2021-10-22 LAB
DEPRECATED RDW RBC AUTO: 46.9 FL (ref 37–54)
ERYTHROCYTE [DISTWIDTH] IN BLOOD BY AUTOMATED COUNT: 13.2 % (ref 12.3–15.4)
HCT VFR BLD AUTO: 51.4 % (ref 37.5–51)
HGB BLD-MCNC: 17.2 G/DL (ref 13–17.7)
MCH RBC QN AUTO: 32.1 PG (ref 26.6–33)
MCHC RBC AUTO-ENTMCNC: 33.5 G/DL (ref 31.5–35.7)
MCV RBC AUTO: 95.9 FL (ref 79–97)
PLATELET # BLD AUTO: 216 10*3/MM3 (ref 140–450)
PMV BLD AUTO: 10.8 FL (ref 6–12)
RBC # BLD AUTO: 5.36 10*6/MM3 (ref 4.14–5.8)
WBC # BLD AUTO: 4.25 10*3/MM3 (ref 3.4–10.8)

## 2021-10-22 PROCEDURE — G0103 PSA SCREENING: HCPCS

## 2021-10-22 PROCEDURE — 80061 LIPID PANEL: CPT

## 2021-10-22 PROCEDURE — 85027 COMPLETE CBC AUTOMATED: CPT

## 2021-10-22 PROCEDURE — 36415 COLL VENOUS BLD VENIPUNCTURE: CPT

## 2021-10-22 PROCEDURE — 80053 COMPREHEN METABOLIC PANEL: CPT

## 2021-10-23 LAB
ALBUMIN SERPL-MCNC: 4.6 G/DL (ref 3.5–5.2)
ALBUMIN/GLOB SERPL: 1.8 G/DL
ALP SERPL-CCNC: 100 U/L (ref 39–117)
ALT SERPL W P-5'-P-CCNC: 16 U/L (ref 1–41)
ANION GAP SERPL CALCULATED.3IONS-SCNC: 9 MMOL/L (ref 5–15)
AST SERPL-CCNC: 19 U/L (ref 1–40)
BILIRUB SERPL-MCNC: 0.5 MG/DL (ref 0–1.2)
BUN SERPL-MCNC: 13 MG/DL (ref 8–23)
BUN/CREAT SERPL: 12.4 (ref 7–25)
CALCIUM SPEC-SCNC: 9.6 MG/DL (ref 8.6–10.5)
CHLORIDE SERPL-SCNC: 102 MMOL/L (ref 98–107)
CHOLEST SERPL-MCNC: 206 MG/DL (ref 0–200)
CO2 SERPL-SCNC: 28 MMOL/L (ref 22–29)
CREAT SERPL-MCNC: 1.05 MG/DL (ref 0.76–1.27)
GFR SERPL CREATININE-BSD FRML MDRD: 70 ML/MIN/1.73
GLOBULIN UR ELPH-MCNC: 2.5 GM/DL
GLUCOSE SERPL-MCNC: 88 MG/DL (ref 65–99)
HDLC SERPL-MCNC: 47 MG/DL (ref 40–60)
LDLC SERPL CALC-MCNC: 148 MG/DL (ref 0–100)
LDLC/HDLC SERPL: 3.12 {RATIO}
POTASSIUM SERPL-SCNC: 4.4 MMOL/L (ref 3.5–5.2)
PROT SERPL-MCNC: 7.1 G/DL (ref 6–8.5)
PSA SERPL-MCNC: 48.4 NG/ML (ref 0–4)
SODIUM SERPL-SCNC: 139 MMOL/L (ref 136–145)
TRIGL SERPL-MCNC: 62 MG/DL (ref 0–150)
VLDLC SERPL-MCNC: 11 MG/DL (ref 5–40)

## 2021-10-26 DIAGNOSIS — R97.20 ELEVATED PSA, GREATER THAN OR EQUAL TO 20 NG/ML: Primary | ICD-10-CM

## 2021-10-28 ENCOUNTER — TELEPHONE (OUTPATIENT)
Dept: FAMILY MEDICINE CLINIC | Facility: CLINIC | Age: 71
End: 2021-10-28

## 2021-10-28 NOTE — TELEPHONE ENCOUNTER
Caller: Madi Blanco    Relationship: Self    Best call back number: [phone]    What is the best time to reach you: ANYTIME    Who are you requesting to speak with (clinical staff, provider,  specific staff member): PJ    Do you know the name of the person who called:     What was the call regarding: TO DISCUSS PSA LEVEL BEFORE NEUROLOGY APPOINTMENT ON 11/01    Do you require a callback: YES

## 2021-11-01 ENCOUNTER — OFFICE VISIT (OUTPATIENT)
Dept: UROLOGY | Facility: CLINIC | Age: 71
End: 2021-11-01

## 2021-11-01 VITALS — OXYGEN SATURATION: 98 % | HEART RATE: 51 BPM | WEIGHT: 198 LBS | HEIGHT: 73 IN | BODY MASS INDEX: 26.24 KG/M2

## 2021-11-01 DIAGNOSIS — R97.20 ELEVATED PROSTATE SPECIFIC ANTIGEN (PSA): Primary | ICD-10-CM

## 2021-11-01 LAB
BILIRUB BLD-MCNC: NEGATIVE MG/DL
CLARITY, POC: CLEAR
COLOR UR: YELLOW
EXPIRATION DATE: NORMAL
GLUCOSE UR STRIP-MCNC: NEGATIVE MG/DL
KETONES UR QL: NEGATIVE
LEUKOCYTE EST, POC: NEGATIVE
Lab: NORMAL
NITRITE UR-MCNC: NEGATIVE MG/ML
PH UR: 5 [PH] (ref 5–8)
PROT UR STRIP-MCNC: NEGATIVE MG/DL
RBC # UR STRIP: NEGATIVE /UL
SP GR UR: 1.01 (ref 1–1.03)
UROBILINOGEN UR QL: NORMAL

## 2021-11-01 PROCEDURE — 99204 OFFICE O/P NEW MOD 45 MIN: CPT | Performed by: UROLOGY

## 2021-11-01 PROCEDURE — 81003 URINALYSIS AUTO W/O SCOPE: CPT | Performed by: UROLOGY

## 2021-11-01 NOTE — PROGRESS NOTES
Elevated PSA Office Visit      Patient Name: Madi Blanco  : 1950   MRN: [id number]     Chief Complaint:  Elevated PSA.    Chief Complaint   Patient presents with   • Elevated PSA   • Benign Prostatic Hypertrophy       Referring Provider: No ref. provider found    History of Present Illness: Madi Blanco is a 71 y.o. male who presents today with history of elevated PSA.  The patient is otherwise healthy with no significant past medical history.  He denies prior urologic surgeries.  He has had his PSA followed by his last value was in  and was found to be 3.3.  Most recent PSA obtained at an annual physical in 10/2021 was 48.  IPSS is 8 with minimal lower urinary tract symptoms.  Denies dysuria or history of urinary tract infection.  Denies hematuria.  Denies prior urologic evaluation or instrumentation.  Significant family history of prostate cancer.    Subjective      Review of System: Review of Systems   Constitutional: Negative.  Negative for chills, fatigue, fever and unexpected weight change.   HENT: Negative.  Negative for sore throat.    Eyes: Negative.  Negative for visual disturbance.   Respiratory: Negative.  Negative for cough, chest tightness and shortness of breath.    Cardiovascular: Negative.  Negative for chest pain and leg swelling.   Gastrointestinal: Negative.  Negative for blood in stool, constipation, diarrhea, nausea, rectal pain and vomiting.   Genitourinary: Positive for frequency. Negative for decreased urine volume, difficulty urinating, dysuria, enuresis, flank pain, genital sores, hematuria and urgency.   Musculoskeletal: Negative.  Negative for back pain and joint swelling.   Skin: Negative.  Negative for rash and wound.   Neurological: Negative.  Negative for seizures, speech difficulty, weakness and headaches.   Psychiatric/Behavioral: Negative.  Negative for confusion, sleep disturbance and suicidal ideas. The patient is not nervous/anxious.       I have reviewed  the ROS documented by my clinical staff, updated as appropriate and I agree. Catarino Guerrero MD    Past Medical History: History reviewed. No pertinent past medical history.    Past Surgical History:   Past Surgical History:   Procedure Laterality Date   • COLONOSCOPY  2016       Family History:   Family History   Problem Relation Age of Onset   • Lymphoma Mother    • Emphysema Father        Social History:   Social History     Socioeconomic History   • Marital status:    Tobacco Use   • Smoking status: Former Smoker     Types: Cigarettes     Quit date:      Years since quittin.8   • Smokeless tobacco: Never Used   Substance and Sexual Activity   • Alcohol use: Yes     Comment: Occ   • Drug use: No       Medications:     Current Outpatient Medications:   •  fluticasone (FLONASE) 50 MCG/ACT nasal spray, 2 sprays into the nostril(s) as directed by provider Daily., Disp: , Rfl:   •  loratadine (CLARITIN) 10 MG tablet, Take 10 mg by mouth Daily., Disp: , Rfl:   •  sildenafil (Viagra) 100 MG tablet, Take 1 tablet by mouth Daily As Needed for Erectile Dysfunction., Disp: 20 tablet, Rfl: 0  •  tamsulosin (FLOMAX) 0.4 MG capsule 24 hr capsule, Take 1 capsule by mouth Daily., Disp: 90 capsule, Rfl: 3    Allergies:   Allergies   Allergen Reactions   • Penicillins        IPSS Questionnaire (AUA-7):  Over the past month…    1)  Incomplete Emptying  How often have you had a sensation of not emptying your bladder?  0 - Not at all   2)  Frequency  How often have you had to urinate less than every two hours? 2 - Less than half the time   3)  Intermittency  How often have you found you stopped and started again several times when you urinated?  2 - Less than half the time   4) Urgency  How often have you found it difficult to postpone urination?  0 - Not at all   5) Weak Stream  How often have you had a weak urinary stream?  2 - Less than half the time   6) Straining  How often have you had to push or strain to  "begin urination?  0 - Not at all   7) Nocturia  How many times did you typically get up at night to urinate?  2 - 2 times   Total Score:  8       Quality of life due to urinary symptoms:  If you were to spend the rest of your life with your urinary condition the way it is now, how would you feel about that? 1-Pleased   Urine Leakage (Incontinence) 0-No Leakage     Sexual Health Inventory for Men (REBECCA)   Over the past 6 months:     1. How do you rate your confidence that you could get and keep an erection?  3 - Moderate   2. When you had erections with sexual                                     stimulation, how often were your erections hard enough for penetration (entering your partner)?  3 - Sometimes (About half the time)    3. During sexual intercourse, how often were you able to maintain your erection after you had penetrated (entered) your partner?  4 - Most times ( much more than, half the time)   4. During sexual intercourse, how difficult was it to maintain your erection to completion of intercourse?  4 - Sightly difficult    5. When you attempted sexual intercourse, how often was it satisfactory for you?  4 - Most times ( much more than, half the time)    Total Score: 18   The Sexual Health Inventory for Men further classifies ED severity with the following breakpoints:   1-7 (Severe ED) 8-11 (Moderate ED) 12-16 (Mild to Moderate ED) 17-21 (Mild ED)      Post void residual bladder scan:   28mL     Objective     Physical Exam:   Vital Signs:   Vitals:    11/01/21 0833   Pulse: 51   SpO2: 98%   Weight: 89.8 kg (198 lb)   Height: 185.4 cm (73\")   PainSc: 0-No pain     Body mass index is 26.12 kg/m².     Physical Exam  Vitals and nursing note reviewed.   Constitutional:       Appearance: Normal appearance. He is normal weight.   Cardiovascular:      Comments: Well-perfused  Pulmonary:      Effort: Pulmonary effort is normal.   Abdominal:      General: Abdomen is flat.      Palpations: Abdomen is soft. "   Musculoskeletal:         General: Normal range of motion.   Skin:     General: Skin is warm and dry.   Neurological:      General: No focal deficit present.      Mental Status: He is alert and oriented to person, place, and time.   Psychiatric:         Mood and Affect: Mood normal.         Behavior: Behavior normal.         Thought Content: Thought content normal.         Genitourinary  Penis: uncircumcised penis, orthotopic meatus, glans normal, no penile discharge.  No rashes/lesions.    Testes: descended bilaterally, no masses, nontender to palpation. Remainder of scrotal contents normal.  Rectal:  Normal tone, no masses.  Prostate:  35 grams.  Symmetric, non-tender, firm right gland no obvious nodule    Labs:   Hematocrit (%)   Date Value   10/22/2021 51.4 (H)   10/18/2018 49.7   07/05/2016 47.2   06/30/2015 47.2   06/24/2014 44.8       Lab Results   Component Value Date    PSA 48.400 (H) 10/22/2021    PSA 3.300 07/05/2016    PSA 3.10 06/30/2015     I personally viewed the patient's most recent PSA of 48.  The patient has a prior value of 3.3 in 2016 was his last known PSA    Images:   No Images in the past 120 days found..    Measures:   Tobacco:   Madi Blanco  reports that he quit smoking about 44 years ago. His smoking use included cigarettes. He has never used smokeless tobacco.. I have educated him on the risk of diseases from using tobacco products.    Assessment / Plan      Assessment:     Mr. Blanco is a 71 y.o. male with elevated PSA.  The patient has no significant lower urinary tract symptoms.  The patient has a recent PSA of 48.4 in 10/2021.  His last known PSA was 3.3 in 7//2016.    Diagnoses and all orders for this visit:    1. Elevated prostate specific antigen (PSA) (Primary)  -     POC Urinalysis Dipstick, Automated  -     PSA, Total & Free; Future           Patient Education:   Today I discussed with the patient the etiology and management of elevated PSA.  Discussed that PSA is a protein  used as a marker for prostate cancer screening. We discussed in depth the role for prostate cancer screening.   We discussed that over 90% of prostate cancers are detected by screening.  We discussed that screening means a test performed on an asymptomatic patient to detect cancer at an earlier point in time.  We discussed the role of screening which includes both PSA and AQUILES.  We discussed the harms of prostate cancer screening including potential overdiagnosis and overtreatment.  Discussed the benefits of screening including diagnosis of cancer or lower staging grade.  Discussed that prostate imaging is not recommended for prostate cancer screening.  Discussed that screening should be offered to him in of an appropriate age to have a life expectancy more than 10 years.  Discussed that PSA is not capable of diagnosing prostate cancer.  We discussed that a biopsy is indicated if PSA is elevated as a confirmation of cancer.  Discussed the decision to perform a biopsy is often based on many criteria not just a total PSA value.  Discussed that a single abnormal PSA should not prompt biopsy.  Abnormal PSA level should be verified after a few weeks.  We discussed the possible etiologies that can result in an elevated PSA test other test other than cancer.   I evaluated his PSA which was elevated at 48.    We did discuss that this is a significantly elevated value and is concerning.  We also discussed that is significantly different from his prior trend.  His last PSA was in 2016. I discussed that at this time I would like to repeat his PSA to ensure its exact value since he has significant elevation compared to his last value.  I would like to check this on short order and we will re-check in approximately 1 week. If his PSA remains elevated we will further discuss prostate biopsy.  Today we did discuss the procedural steps with regards to the risk and benefits of prostate biopsy.  Patient has no significant family  history of prostate cancer.     Follow Up:   Return in 11 days (on 11/12/2021).    I spent 45 minutes providing clinical care for this patient; including review of patient's chart and provider documentation, face to face time spent with patient in examination room (obtaining history, performing physical exam, discussing diagnosis and management options), placing orders, and completing patient documentation.     Catarino Guerrero MD  [facility] [place]

## 2021-11-11 ENCOUNTER — LAB (OUTPATIENT)
Dept: LAB | Facility: HOSPITAL | Age: 71
End: 2021-11-11

## 2021-11-11 DIAGNOSIS — R97.20 ELEVATED PROSTATE SPECIFIC ANTIGEN (PSA): ICD-10-CM

## 2021-11-11 PROCEDURE — 84153 ASSAY OF PSA TOTAL: CPT

## 2021-11-11 PROCEDURE — 36415 COLL VENOUS BLD VENIPUNCTURE: CPT

## 2021-11-11 PROCEDURE — 84154 ASSAY OF PSA FREE: CPT

## 2021-11-12 LAB
PSA FREE MFR SERPL: 12.4 %
PSA FREE SERPL-MCNC: 6.62 NG/ML
PSA SERPL-MCNC: 53.4 NG/ML (ref 0–4)

## 2021-11-15 ENCOUNTER — OFFICE VISIT (OUTPATIENT)
Dept: UROLOGY | Facility: CLINIC | Age: 71
End: 2021-11-15

## 2021-11-15 VITALS
SYSTOLIC BLOOD PRESSURE: 144 MMHG | HEIGHT: 73 IN | WEIGHT: 198 LBS | DIASTOLIC BLOOD PRESSURE: 92 MMHG | OXYGEN SATURATION: 97 % | HEART RATE: 84 BPM | BODY MASS INDEX: 26.24 KG/M2

## 2021-11-15 DIAGNOSIS — R97.20 ELEVATED PROSTATE SPECIFIC ANTIGEN (PSA): Primary | ICD-10-CM

## 2021-11-15 PROCEDURE — 99214 OFFICE O/P EST MOD 30 MIN: CPT | Performed by: UROLOGY

## 2021-11-15 RX ORDER — CIPROFLOXACIN 750 MG/1
750 TABLET, FILM COATED ORAL 2 TIMES DAILY
Qty: 6 TABLET | Refills: 0 | Status: SHIPPED | OUTPATIENT
Start: 2021-11-15 | End: 2021-11-18

## 2021-11-15 NOTE — PROGRESS NOTES
Elevated PSA Office Visit      Patient Name: Madi Blanco  : 1950   MRN: [id number]     Chief Complaint:  Elevated PSA.    Chief Complaint   Patient presents with   • Elevated PSA       History of Present Illness: Madi Blanco is a 71 y.o. male who presents today with history of elevated PSA.  Patient was previously seen on 2021 for elevated PSA.  The PSA was found to be 48.4.  Based on significant increase from prior trend was recommended we repeat a PSA.  Patient today returns with repeat PSA.      Subjective      Review of System: Review of Systems   Constitutional: Negative for chills, fatigue, fever and unexpected weight change.   HENT: Negative for sore throat.    Eyes: Negative for visual disturbance.   Respiratory: Negative for cough, chest tightness and shortness of breath.    Cardiovascular: Negative for chest pain and leg swelling.   Gastrointestinal: Negative for blood in stool, constipation, diarrhea, nausea, rectal pain and vomiting.   Genitourinary: Negative for decreased urine volume, difficulty urinating, dysuria, enuresis, flank pain, frequency, genital sores, hematuria and urgency.   Musculoskeletal: Negative for back pain and joint swelling.   Skin: Negative for rash and wound.   Neurological: Negative for seizures, speech difficulty, weakness and headaches.   Psychiatric/Behavioral: Negative for confusion, sleep disturbance and suicidal ideas. The patient is not nervous/anxious.       I have reviewed the ROS documented by my clinical staff, updated as appropriate and I agree. Catarino Guerrero MD    Past Medical History: History reviewed. No pertinent past medical history.    Past Surgical History:   Past Surgical History:   Procedure Laterality Date   • COLONOSCOPY  2016       Family History:   Family History   Problem Relation Age of Onset   • Lymphoma Mother    • Emphysema Father        Social History:   Social History     Socioeconomic History   • Marital status:  "   Tobacco Use   • Smoking status: Former Smoker     Types: Cigarettes     Quit date:      Years since quittin.9   • Smokeless tobacco: Never Used   Substance and Sexual Activity   • Alcohol use: Yes     Comment: Occ   • Drug use: No       Medications:     Current Outpatient Medications:   •  fluticasone (FLONASE) 50 MCG/ACT nasal spray, 2 sprays into the nostril(s) as directed by provider Daily., Disp: , Rfl:   •  loratadine (CLARITIN) 10 MG tablet, Take 10 mg by mouth Daily., Disp: , Rfl:   •  sildenafil (Viagra) 100 MG tablet, Take 1 tablet by mouth Daily As Needed for Erectile Dysfunction., Disp: 20 tablet, Rfl: 0  •  tamsulosin (FLOMAX) 0.4 MG capsule 24 hr capsule, Take 1 capsule by mouth Daily., Disp: 90 capsule, Rfl: 3  •  ciprofloxacin (CIPRO) 750 MG tablet, Take 1 tablet by mouth 2 (Two) Times a Day for 3 days. Please take PO BID day before, day of, day after, Disp: 6 tablet, Rfl: 0    Allergies:   Allergies   Allergen Reactions   • Penicillins          Objective     Physical Exam:   Vital Signs:   Vitals:    11/15/21 0808   BP: 144/92   Pulse: 84   SpO2: 97%   Weight: 89.8 kg (198 lb)   Height: 185.4 cm (72.99\")   PainSc: 0-No pain     Body mass index is 26.13 kg/m².     Physical Exam  Vitals and nursing note reviewed.   Constitutional:       Appearance: Normal appearance. He is normal weight.   Musculoskeletal:         General: Normal range of motion.   Skin:     General: Skin is warm and dry.   Neurological:      General: No focal deficit present.      Mental Status: He is alert and oriented to person, place, and time. Mental status is at baseline.   Psychiatric:         Mood and Affect: Mood normal.         Behavior: Behavior normal.         Thought Content: Thought content normal.         Judgment: Judgment normal.         Labs:   Hematocrit (%)   Date Value   10/22/2021 51.4 (H)   10/18/2018 49.7   2016 47.2   2015 47.2   2014 44.8       Lab Results   Component " Value Date    PSA 53.4 (H) 11/11/2021    PSA 48.400 (H) 10/22/2021    PSA 3.300 07/05/2016     Have personally viewed the patient's recent PSA value including a PSA 53.4.    Images:   No Images in the past 120 days found..        Assessment / Plan      Assessment:     Mr. Blanco is a 71 y.o. male with elevated PSA.  Patient was recently evaluated for an elevated PSA of 48.4.  Due to significantly elevated value based upon prior trend repeat PSA was obtained which was found to be 53.4.  He returns today for discussion of elevated PSA.    Diagnoses and all orders for this visit:    1. Elevated prostate specific antigen (PSA) (Primary)  -     ciprofloxacin (CIPRO) 750 MG tablet; Take 1 tablet by mouth 2 (Two) Times a Day for 3 days. Please take PO BID day before, day of, day after  Dispense: 6 tablet; Refill: 0         Plan:   Plan for Prostate Biopsy on 11/22/21.       Patient Education:   Today I discussed with the patient the etiology and management of elevated PSA.  Discussed that PSA is a protein used as a marker for prostate cancer screening. We discussed in depth the role for prostate cancer screening.   We discussed that over 90% of prostate cancers are detected by screening.  We discussed that screening means a test performed on an asymptomatic patient to detect cancer at an earlier point in time.  We discussed the role of screening which includes both PSA and AQUILES.  We discussed the harms of prostate cancer screening including potential overdiagnosis and overtreatment.  Discussed the benefits of screening including diagnosis of cancer or lower staging grade.  Discussed that prostate imaging is not recommended for prostate cancer screening.  Discussed that screening should be offered to him in of an appropriate age to have a life expectancy more than 10 years.  Discussed that PSA is not capable of diagnosing prostate cancer.  We discussed that a biopsy is indicated if PSA is elevated as a confirmation of cancer.   Discussed the decision to perform a biopsy is often based on many criteria not just a total PSA value.  Discussed that a single abnormal PSA should not prompt biopsy.  Abnormal PSA level should be verified after a few weeks.  We discussed the possible etiologies that can result in an elevated PSA test other test other than cancer.   I evaluated his PSA which was elevated at 48 and confirmed elevated at 53.4.  At this time based upon significantly elevated PSA which is confirmed I would recommend biopsy. Today we did discuss the procedural steps with regards to the risk and benefits of prostate biopsy.  We discussed risk of infection after prostate biopsy.  We discussed the indication for antibiotics prior to biopsy. Patient has no significant family history of prostate cancer.  We will schedule biopsy within the next week.    Follow Up:   Return in about 1 week (around 11/22/2021).    I spent 30 minutes providing clinical care for this patient; including review of patient's chart and provider documentation, face to face time spent with patient in examination room (obtaining history, performing physical exam, discussing diagnosis and management options), placing orders, and completing patient documentation.     Catarino Guerrero MD  [facility] [place]

## 2021-11-22 ENCOUNTER — PROCEDURE VISIT (OUTPATIENT)
Dept: UROLOGY | Facility: CLINIC | Age: 71
End: 2021-11-22

## 2021-11-22 VITALS — HEIGHT: 73 IN | HEART RATE: 71 BPM | WEIGHT: 198 LBS | BODY MASS INDEX: 26.24 KG/M2 | OXYGEN SATURATION: 96 %

## 2021-11-22 DIAGNOSIS — R97.20 ELEVATED PROSTATE SPECIFIC ANTIGEN (PSA): Primary | ICD-10-CM

## 2021-11-22 LAB
BILIRUB BLD-MCNC: NEGATIVE MG/DL
CLARITY, POC: CLEAR
COLOR UR: YELLOW
EXPIRATION DATE: ABNORMAL
GLUCOSE UR STRIP-MCNC: NEGATIVE MG/DL
KETONES UR QL: NEGATIVE
LEUKOCYTE EST, POC: NEGATIVE
Lab: ABNORMAL
NITRITE UR-MCNC: NEGATIVE MG/ML
PH UR: 6 [PH] (ref 5–8)
PROT UR STRIP-MCNC: NEGATIVE MG/DL
RBC # UR STRIP: ABNORMAL /UL
SP GR UR: 1.02 (ref 1–1.03)
UROBILINOGEN UR QL: NORMAL

## 2021-11-22 PROCEDURE — 81003 URINALYSIS AUTO W/O SCOPE: CPT | Performed by: UROLOGY

## 2021-11-22 PROCEDURE — 76942 ECHO GUIDE FOR BIOPSY: CPT | Performed by: UROLOGY

## 2021-11-22 PROCEDURE — 55700 PR PROSTATE NEEDLE BIOPSY ANY APPROACH: CPT | Performed by: UROLOGY

## 2021-11-22 NOTE — PROGRESS NOTES
Preprocedure diagnosis  Elevated PSA    Postprocedure diagnosis  Elevated PSA    Procedure  Transrectal ultrasound-guided prostate biopsy, local prostate block with 1% lidocaine injection    Attending surgeon  Catarino Guerrero    Anesthesia  1% Lidocaine prostate block    Complications  None    Indications  71-year-old male presenting for evaluation with elevated PSA who presents today for transrectal ultrasound-guided prostate biopsy after discussion of risks, benefits, alternatives.  Informed consent was obtained.  Patient has taken his ciprofloxacin pre-procedurally and completed an enema the night before his biopsy.    Findings  -Digital rectal examination = firmness to the right prostate no obvious nodule, normal tone, nontender to palpation  -Prebiopsy PSA= 48.4, confirmation PSA 53.4  -Prostate volume measurements = 37.8 cc volume  -Total number of biopsy cores= 12 cores    Procedure   The patient was positioned and prepped in a left lateral position with lower extremities flexed.       A digital rectal exam was performed identifying firmness of the right gland, no obvious nodules, nontender to palpation.    The rectal ultrasound probe was slowly introduced into the rectum without difficulty.  The prostate and seminal vesicles were inspected systematically using axial and sagittal views with the ultrasound.      The dimensions of the prostate were measured occluding width 52.5 mm, height 30.3 mm, length 45.4 mm, for a calculated volume of 37.8.     Next 5 cc of 1% lidocaine was injected at the right and left neurovascular bundles at the junction of the seminal vesicles bilaterally.  Next using a true cut biopsy needle, 12 prostate cores were collected. The specific locations were the following: left lateral base, left lateral mid, left lateral apex, left medial base, left medial mid, and left medial apex, right lateral base, right lateral mid, right lateral apex, right medial base, right medial mid, right  medial apex, and right and left transition zones.  The rectal ultrasound probe was held in place for 2 minutes for hemostasis.  The rectal ultrasound probe was removed.  The patient tolerated the procedure well.     Disposition/Follow Up:     1.  The patient was instructed to drink plenty of fluids and warned about possible complications and side effects including, but not limited to, blood in the urine, stool and semen as well as bloodstream infection.  He was instructed to call the office if there are any issues, especially fevers or flu-like symptoms.    2.  Continue antibiotic for a total of 3 days.  3.  Return 11/30/21 for Pathology Discussion     Catarino Guerrero MD  [facility]

## 2021-11-30 ENCOUNTER — OFFICE VISIT (OUTPATIENT)
Dept: UROLOGY | Facility: CLINIC | Age: 71
End: 2021-11-30

## 2021-11-30 VITALS — BODY MASS INDEX: 26.24 KG/M2 | HEIGHT: 73 IN | OXYGEN SATURATION: 98 % | HEART RATE: 65 BPM | WEIGHT: 198 LBS

## 2021-11-30 DIAGNOSIS — C61 PROSTATE CANCER (HCC): Primary | ICD-10-CM

## 2021-11-30 PROCEDURE — 99215 OFFICE O/P EST HI 40 MIN: CPT | Performed by: UROLOGY

## 2021-11-30 NOTE — PROGRESS NOTES
Prostate Cancer Office Visit      Patient Name: Madi Blanco  : 1950   MRN: [id number]     Chief Complaint:  Prostate Cancer.   Chief Complaint   Patient presents with   • Discussion   • Prostate Cancer       Referring Provider: Bharathi Bryan MD    History of Present Illness: Madi Blanco is a 71 y.o. male who presents today for discussion of recent prostatic biopsy with diagnosis of prostate cancer.  Patient was recently evaluated due to elevation of PSA.  PSA found to be 48.4 in 10/2021.  Recheck in 2021 was 53.4.  The patient underwent prostate biopsy on 2021.  He denies difficulty post biopsy.  Turns today for pathology discussion.  Surgical pathology demonstrated [place] 3+3, grade group 1 prostate cancer in 1 of 2 cores in the right base and mid gland.  Additionally, patient was found to have [place] 4+3, grade group 3 prostate cancer in 2 of 2 cores from the left base, mid, apex.  Additionally there was evidence of a EPE with grade group 3 prostate cancer identified in periprostatic tissue and a core from the left base.  The patient denies systemic symptoms including weight loss, fatigue, bone pain, joint pain.  The patient denies significant lower urinary tract symptoms.  The patient does report history of erectile dysfunction, requiring sildenafil.  Patient denies prior abdominal surgery.  Denies family history of prostate cancer.      Subjective      Review of System: Review of Systems   Constitutional: Negative.  Negative for chills, fatigue, fever and unexpected weight change.   HENT: Negative.  Negative for sore throat.    Eyes: Negative.  Negative for visual disturbance.   Respiratory: Negative.  Negative for cough, chest tightness and shortness of breath.    Cardiovascular: Negative.  Negative for chest pain and leg swelling.   Gastrointestinal: Negative.  Negative for blood in stool, constipation, diarrhea, nausea, rectal pain and vomiting.   Genitourinary: Negative.   Negative for decreased urine volume, difficulty urinating, dysuria, enuresis, flank pain, frequency, genital sores, hematuria and urgency.   Musculoskeletal: Negative.  Negative for back pain and joint swelling.   Skin: Negative.  Negative for rash and wound.   Neurological: Negative.  Negative for seizures, speech difficulty, weakness and headaches.   Psychiatric/Behavioral: Negative.  Negative for confusion, sleep disturbance and suicidal ideas. The patient is not nervous/anxious.       I have reviewed the ROS documented by my clinical staff, I have updated appropriately and I agree. Catarino Guerrero MD    Past Medical History: History reviewed. No pertinent past medical history.    Past Surgical History:   Past Surgical History:   Procedure Laterality Date   • COLONOSCOPY  2016       Family History:   Family History   Problem Relation Age of Onset   • Lymphoma Mother    • Emphysema Father        Social History:   Social History     Socioeconomic History   • Marital status:    Tobacco Use   • Smoking status: Former Smoker     Types: Cigarettes     Quit date:      Years since quittin.9   • Smokeless tobacco: Never Used   Substance and Sexual Activity   • Alcohol use: Yes     Comment: Occ   • Drug use: No       Medications:     Current Outpatient Medications:   •  fluticasone (FLONASE) 50 MCG/ACT nasal spray, 2 sprays into the nostril(s) as directed by provider Daily., Disp: , Rfl:   •  loratadine (CLARITIN) 10 MG tablet, Take 10 mg by mouth Daily., Disp: , Rfl:   •  sildenafil (Viagra) 100 MG tablet, Take 1 tablet by mouth Daily As Needed for Erectile Dysfunction., Disp: 20 tablet, Rfl: 0  •  tamsulosin (FLOMAX) 0.4 MG capsule 24 hr capsule, Take 1 capsule by mouth Daily., Disp: 90 capsule, Rfl: 3    Allergies:   Allergies   Allergen Reactions   • Penicillins           Objective     Physical Exam:   Vital Signs:   Vitals:    21 1608   Pulse: 65   SpO2: 98%   Weight: 89.8 kg (198 lb)  "  Height: 185.4 cm (72.99\")   PainSc: 0-No pain     Body mass index is 26.13 kg/m².     Physical Exam  Vitals and nursing note reviewed.   Constitutional:       Appearance: Normal appearance. He is normal weight.   Cardiovascular:      Comments: Well-perfused  Pulmonary:      Effort: Pulmonary effort is normal.   Abdominal:      General: Abdomen is flat.      Palpations: Abdomen is soft.   Musculoskeletal:         General: Normal range of motion.   Skin:     General: Skin is dry.   Neurological:      General: No focal deficit present.      Mental Status: He is alert and oriented to person, place, and time. Mental status is at baseline.   Psychiatric:         Mood and Affect: Mood normal.         Behavior: Behavior normal.         Thought Content: Thought content normal.         Judgment: Judgment normal.         Labs:   Lab Results   Component Value Date    PSA 53.4 (H) 11/11/2021    PSA 48.400 (H) 10/22/2021    PSA 3.300 07/05/2016       Lab Results   Component Value Date    WBC 4.25 10/22/2021    HGB 17.2 10/22/2021    HCT 51.4 (H) 10/22/2021    MCV 95.9 10/22/2021     10/22/2021       Lab Results   Component Value Date    GLUCOSE 88 10/22/2021    BUN 13 10/22/2021    CREATININE 1.05 10/22/2021    EGFRIFNONA 70 10/22/2021    BCR 12.4 10/22/2021    K 4.4 10/22/2021    CO2 28.0 10/22/2021    CALCIUM 9.6 10/22/2021    ALBUMIN 4.60 10/22/2021    AST 19 10/22/2021    ALT 16 10/22/2021         Assessment / Plan      Assessment:   Mr. Madi Blanco is a 71 y.o. male who presented today with recently diagnosed prostate cancer. Patient was recently evaluated due to elevation of PSA.  PSA found to be 48.4 in 10/2021.  Recheck in 11/2021 was 53.4.  The patient underwent prostate biopsy on 11/22/2021.  He denies difficulty post biopsy.  Turns today for pathology discussion.  Surgical pathology demonstrated [place] 3+3, grade group 1 prostate cancer in 1 of 2 cores in the right base and mid gland.  Additionally, " patient was found to have [place] 4+3, grade group 3 prostate cancer in 2 of 2 cores from the left base, mid, apex, 6 out of 6 cores positive on the left.  Additionally there was evidence of a EPE with grade group 3 prostate cancer identified in periprostatic tissue and a core from the left base.      Diagnoses and all orders for this visit:    1. Prostate cancer (HCC) (Primary)  -     CT Abdomen Pelvis With & Without Contrast; Future  -     NM Bone Scan Whole Body; Future         Patient Education:   Today we discussed the etiology and management of newly diagnosed prostate cancer.  We discussed his biopsy pathology which is reported above.  We discussed the [place] score as well as grading group system.  We discussed that he has significantly elevated PSA with a value of 48.4 and confirmation of 53.4.  Discussed that this is a high risk and concerning feature.  We discussed that we will need to perform imaging to stage his prostate cancer.  We discussed the 2 most common sites of metastasis of the bones and the pelvic lymph nodes.  We discussed that imaging focuses on these areas.  We discussed CT imaging study as well as nuclear medicine bone scan are warranted.  We discussed that at this time he does not have symptoms suggestive of metastatic disease but should still undergo imaging.  We discussed that based on biopsy pathology he is NCCN high risk disease given his PSA is greater than 20.  We discussed that he has grade group 3 disease on biopsied pathology within the left prostate, 6 out of 6 cores positive,  with small focus of grade group 1 disease in the right prostate I did discuss concern regarding his biopsy core that demonstrates EPE with grade group 3 disease in the periprosthetic tissue.  We discussed that imaging studies are required to evaluate for disease outside of the prostate.  If this is identified he will require systemic therapy.  We discussed that if his prostate cancer is localized to the  prostate there are multiple treatment options.  I counseled the patient extensively on the nature of his cancer and the treatment options were discussed including surgical  radiation-based options.  He was given detailed information regarding the short term and long term advantages and disadvantages of each treatment option. Including detailed discussion of the urinary, sexual and bowel complications. We discussed robotic surgery in particular with discussion of the expected perioperative course, risks, complications, and potential side effects.  We also discussed the concept of nerve sparing and the balance between cancer control and erectile function.  Higher to further discussion regarding any localized treatment we need to evaluate for metastatic disease.  We will order CT scan and nuclear medicine bone scan in the next 1 to 2 weeks with a follow-up for discussion of these results.  The patient is understanding agreeable plan of care.    Follow Up:   Return in about 20 days (around 12/20/2021).    I spent 50 minutes providing clinical care for this patient; including review of patient's chart and provider documentation, face to face time spent with patient in examination room (obtaining history, performing physical exam, discussing diagnosis and management options), placing orders, and completing patient documentation.     Catarino Guerrero MD  [facility] [place]

## 2021-12-01 ENCOUNTER — TELEPHONE (OUTPATIENT)
Dept: FAMILY MEDICINE CLINIC | Facility: CLINIC | Age: 71
End: 2021-12-01

## 2021-12-01 NOTE — TELEPHONE ENCOUNTER
His colonoscopy was done in 2016 and no polyps were found.  It was recommended he have a follow-up colonoscopy in 10 years.  That is documented in the note of Dr. Damian who performed the colonoscopy.???

## 2021-12-01 NOTE — TELEPHONE ENCOUNTER
Caller: Madi Blanco    Relationship: Self    Best call back number: [phone] (H)    What orders are you requesting (i.e. lab or imaging): COLONOSCOPY    In what timeframe would the patient need to come in: AS SOON AS POSSIBLE    (OVERDUE OVER 5 YEARS AGO)    Where will you receive your lab/imaging services:  FACILITY    Additional notes:   PATIENT STATES THAT IT HAS BEEN OVER 5 YEARS THAT HE HAD A COLONOSCOPY AND IS REQUESTING AN ORDER TO HAVE ON DONE AS SOON AS POSSIBLE.     PATIENT WILL LIKE A CALL ONCE THE ORDER HAS BEEN PLACED AND APPROVED.       PATIENT HAS BEEN ADVISED TO PLEASE ALLOW 48 HOURS FOR OUR CLINICAL TEAM WILL FOLLOW UP ON THIS REQUEST.

## 2021-12-10 ENCOUNTER — HOSPITAL ENCOUNTER (OUTPATIENT)
Dept: CT IMAGING | Facility: HOSPITAL | Age: 71
Discharge: HOME | End: 2021-12-10
Admitting: UROLOGY

## 2021-12-10 ENCOUNTER — HOSPITAL ENCOUNTER (OUTPATIENT)
Dept: NUCLEAR MEDICINE | Facility: HOSPITAL | Age: 71
Discharge: HOME | End: 2021-12-10

## 2021-12-10 DIAGNOSIS — C61 PROSTATE CANCER (HCC): ICD-10-CM

## 2021-12-10 PROCEDURE — A9503 TC99M MEDRONATE: HCPCS | Performed by: UROLOGY

## 2021-12-10 PROCEDURE — 0 TECHNETIUM MEDRONATE KIT: Performed by: UROLOGY

## 2021-12-10 PROCEDURE — 74178 CT ABD&PLV WO CNTR FLWD CNTR: CPT

## 2021-12-10 PROCEDURE — 78306 BONE IMAGING WHOLE BODY: CPT

## 2021-12-10 PROCEDURE — 25010000002 IOPAMIDOL 61 % SOLUTION: Performed by: UROLOGY

## 2021-12-10 RX ORDER — TC 99M MEDRONATE 20 MG/10ML
27.5 INJECTION, POWDER, LYOPHILIZED, FOR SOLUTION INTRAVENOUS
Status: COMPLETED | OUTPATIENT
Start: 2021-12-10 | End: 2021-12-10

## 2021-12-10 RX ADMIN — Medication 27.5 MILLICURIE: at 10:54

## 2021-12-10 RX ADMIN — IOPAMIDOL 95 ML: 612 INJECTION, SOLUTION INTRAVENOUS at 13:13

## 2021-12-20 ENCOUNTER — OFFICE VISIT (OUTPATIENT)
Dept: UROLOGY | Facility: CLINIC | Age: 71
End: 2021-12-20

## 2021-12-20 VITALS — HEIGHT: 73 IN | BODY MASS INDEX: 26.24 KG/M2 | OXYGEN SATURATION: 97 % | HEART RATE: 66 BPM | WEIGHT: 198 LBS

## 2021-12-20 DIAGNOSIS — C61 PROSTATE CANCER (HCC): Primary | ICD-10-CM

## 2021-12-20 PROCEDURE — 99215 OFFICE O/P EST HI 40 MIN: CPT | Performed by: UROLOGY

## 2021-12-20 NOTE — PROGRESS NOTES
Prostate Cancer Office Visit      Patient Name: Madi Blanco  : 1950   MRN: [id number]     Chief Complaint:  Prostate Cancer.     History of Present Illness: Madi Blanco is a 71 y.o. male who presents today with recently diagnosed prostate cancer.  Patient has high risk prostate cancer with PSA of 48, repeat PSA of 53 prebiopsy.  Surgical prostate biopsy pathology demonstrating grade group 3 in 7 of 7 cores from the left gland.  Perineural invasion identified.  Concern for extraprostatic extension in a left base specimen that included periprosthetic tissue which was positive for disease.  Patient presents after CT abdomen and pelvis and nuclear medicine bone scan has been completed.  The patient denies systemic symptoms including fever, chills, nausea, emesis, weight loss, bone pain.  Staging imaging is negative without evidence of disease within pelvic lymph nodes or bones.  Patient denies significant lower urinary tract symptoms.  Does report a history of erectile dysfunction requiring sildenafil.  Denies prior abdominal surgery.  Denies family history of prostate cancer.    Subjective      Review of System: Review of Systems   Constitutional: Negative for chills, fatigue, fever and unexpected weight change.   HENT: Negative for sore throat.    Eyes: Negative for visual disturbance.   Respiratory: Negative for cough, chest tightness and shortness of breath.    Cardiovascular: Negative for chest pain and leg swelling.   Gastrointestinal: Negative for blood in stool, constipation, diarrhea, nausea, rectal pain and vomiting.   Genitourinary: Negative for decreased urine volume, difficulty urinating, dysuria, enuresis, flank pain, frequency, genital sores, hematuria and urgency.   Musculoskeletal: Negative for back pain and joint swelling.   Skin: Negative for rash and wound.   Neurological: Negative for seizures, speech difficulty, weakness and headaches.   Psychiatric/Behavioral: Negative for  "confusion, sleep disturbance and suicidal ideas. The patient is not nervous/anxious.       I have reviewed the ROS documented by my clinical staff, I have updated appropriately and I agree. Catarino Guerrero MD    Past Medical History: History reviewed. No pertinent past medical history.    Past Surgical History:   Past Surgical History:   Procedure Laterality Date   • COLONOSCOPY  2016       Family History:   Family History   Problem Relation Age of Onset   • Lymphoma Mother    • Emphysema Father        Social History:   Social History     Socioeconomic History   • Marital status:    Tobacco Use   • Smoking status: Former Smoker     Types: Cigarettes     Quit date:      Years since quittin.9   • Smokeless tobacco: Never Used   Substance and Sexual Activity   • Alcohol use: Yes     Comment: Occ   • Drug use: No       Medications:     Current Outpatient Medications:   •  tamsulosin (FLOMAX) 0.4 MG capsule 24 hr capsule, Take 1 capsule by mouth Daily., Disp: 90 capsule, Rfl: 3  •  fluticasone (FLONASE) 50 MCG/ACT nasal spray, 2 sprays into the nostril(s) as directed by provider Daily., Disp: , Rfl:   •  loratadine (CLARITIN) 10 MG tablet, Take 10 mg by mouth Daily., Disp: , Rfl:   •  sildenafil (Viagra) 100 MG tablet, Take 1 tablet by mouth Daily As Needed for Erectile Dysfunction., Disp: 20 tablet, Rfl: 0    Allergies:   Allergies   Allergen Reactions   • Penicillins          Objective     Physical Exam:   Vital Signs:   Vitals:    21 1536   Pulse: 66   SpO2: 97%   Weight: 89.8 kg (198 lb)   Height: 185.4 cm (72.99\")   PainSc: 0-No pain     Body mass index is 26.13 kg/m².     Physical Exam  Vitals and nursing note reviewed.   Constitutional:       Appearance: Normal appearance. He is normal weight.   Pulmonary:      Effort: Pulmonary effort is normal.   Abdominal:      General: Abdomen is flat.      Palpations: Abdomen is soft.   Musculoskeletal:         General: Normal range of motion. "   Skin:     General: Skin is warm and dry.   Neurological:      General: No focal deficit present.      Mental Status: He is alert and oriented to person, place, and time. Mental status is at baseline.   Psychiatric:         Mood and Affect: Mood normal.         Behavior: Behavior normal.         Thought Content: Thought content normal.         Judgment: Judgment normal.         Labs:   Lab Results   Component Value Date    PSA 53.4 (H) 11/11/2021    PSA 48.400 (H) 10/22/2021    PSA 3.300 07/05/2016       Lab Results   Component Value Date    WBC 4.25 10/22/2021    HGB 17.2 10/22/2021    HCT 51.4 (H) 10/22/2021    MCV 95.9 10/22/2021     10/22/2021       Lab Results   Component Value Date    GLUCOSE 88 10/22/2021    BUN 13 10/22/2021    CREATININE 1.05 10/22/2021    EGFRIFNONA 70 10/22/2021    BCR 12.4 10/22/2021    K 4.4 10/22/2021    CO2 28.0 10/22/2021    CALCIUM 9.6 10/22/2021    ALBUMIN 4.60 10/22/2021    AST 19 10/22/2021    ALT 16 10/22/2021       Images:   CT Abdomen Pelvis With & Without Contrast    Result Date: 12/12/2021  No evidence of acute intra-abdominal or intrapelvic disease. In particular, no evidence of metastatic disease is seen.  D:  12/10/2021 E:  12/10/2021  This report was finalized on 12/12/2021 9:31 AM by Dr. Antonio Rosenthal MD.      NM Bone Scan Whole Body    Result Date: 12/12/2021  1. Small focus of activity in the region of the right SI joint that appears to correspond to right SI joint DJD and what appears to be degenerative bony sclerosis on recent CT scan. This would be coincidental for an isolated sclerotic metastatic lesion, but follow-up surveillance of this area might be considered.  2. Trace activity in the inferior angle of the right scapula, favored to be incidental. If the patient has shoulder pain, however, consider follow-up imaging.  3. Otherwise negative nuclear medicine bone scan.  D:  12/10/2021 E:  12/10/2021  This report was finalized on 12/12/2021 10:24 AM by   Antonio Rosenthal MD.      I personally viewed the patient's CT abdomen pelvis as well as bone scan.  No evidence of obvious metastatic disease.    Assessment / Plan      Assessment:   Mr. Madi Blanco is a 71 y.o. male who presented today with recently diagnosed NCCN high risk prostate cancer.  PSA of 48.4 recheck 53.4 resulting in prostate biopsy 11/22/2021.  Surgical prostate pathology demonstrating grade group 3, Javan 4+3 prostate cancer in 7 of 7 cores taken from the left gland.  Additionally there is evidence of VTE with grade group 3 prostate cancer identified in periprosthetic tissue from a core biopsy in the left base.  Denies systemic symptoms including weight loss fatigue bone pain joint pain.  Denies significant urinary tract symptoms.  History of erectile dysfunction requiring PDE 5 inhibitor.  No prior abdominal surgery.  No family history of prostate cancer.    Diagnoses and all orders for this visit:    1. Prostate cancer (HCC) (Primary)  -     [facility]         Patient Education:   I counseled the patient extensively on the nature of his cancer and the treatment options.  We discussed the staging imaging which does not demonstrate concern for metastatic disease at this time.  I did discuss concern based upon his NCCN high risk prostate cancer and elevated PSA volume greater than 50 that we may not be identifying possible metastatic spread.  Discussed his Oncotype study demonstrated a GPS score of 57 which is high risk.  Discussed that this study does have significant concern for 90% chance of biochemical recurrence within 3 years.  We discussed indication for surgical procedure.  We discussed specific risks and benefits including urinary incontinence and erectile dysfunction.  Also discussed referral to radiation oncology for discussion of radiation.  Discussed that he will likely require multi modal therapy with radiation and ADT if this is his preferred  treatment option.  Discussed that he may also require further additional therapies down the line if he has evidence of biochemical recurrence or disease recurrence.  He is understanding agreeable.  Understanding of his pathology and that he has high risk features.  I will refer him to Dr. Remigio Haines of radiation oncology.  At this time the patient does report that he would prefer radiation he will follow-up if he requires fiducial marker placement.  We will continue to follow PSA posttreatment.  The patient is understanding agreeable plan of care    Follow Up:   Return in about 6 weeks (around 1/31/2022) for Recheck.    I spent 45 minutes providing clinical care for this patient; including review of patient's chart and provider documentation, face to face time spent with patient in examination room (obtaining history, performing physical exam, discussing diagnosis and management options), placing orders, and completing patient documentation.     Catarino Guerrero MD  [facility] [place]

## 2022-01-17 ENCOUNTER — HOSPITAL ENCOUNTER (OUTPATIENT)
Dept: RADIATION ONCOLOGY | Facility: HOSPITAL | Age: 72
Setting detail: RADIATION/ONCOLOGY SERIES
Discharge: HOME | End: 2022-01-17

## 2022-01-17 ENCOUNTER — OFFICE VISIT (OUTPATIENT)
Dept: RADIATION ONCOLOGY | Facility: HOSPITAL | Age: 72
End: 2022-01-17

## 2022-01-17 VITALS
RESPIRATION RATE: 20 BRPM | TEMPERATURE: 97.8 F | OXYGEN SATURATION: 97 % | BODY MASS INDEX: 26.78 KG/M2 | HEART RATE: 72 BPM | WEIGHT: 202.9 LBS | SYSTOLIC BLOOD PRESSURE: 189 MMHG | DIASTOLIC BLOOD PRESSURE: 84 MMHG

## 2022-01-17 DIAGNOSIS — C61 PROSTATE CANCER: Primary | ICD-10-CM

## 2022-01-17 PROCEDURE — G0463 HOSPITAL OUTPT CLINIC VISIT: HCPCS

## 2022-01-17 NOTE — PROGRESS NOTES
CONSULTATION NOTE      :                                                          1950  DATE OF CONSULTATION:                       2022   REQUESTING PHYSICIAN:                   Catarino Guerrero MD  REASON FOR CONSULTATION:           Prostate cancer (HCC)  - Stage IIIA (cT2c, cN0, cM0, PSA: 53.4, Grade Group: 3)       BRIEF HISTORY:  The patient is a very pleasant 71 y.o. male  with recently diagnosed prostate cancer.  He presented with a significant increase in PSA from a value of 3.3 ng/ml 5 years ago to 48.4 ng/ml on 10/22/2021.  Repeat PSA 2021 was even higher with value 53.4 ng/ml.  Prostate was moderately enlarged with induration but no nodules.  On ultrasound gland measured 37.8 cc volume.  Biopsy performed 2021 revealed presence of prostatic adenocarcinoma bilaterally.    Javan's 4+3=7, diffusely involving 6 out of 6 cores from the left lobe with tumor occupying 80 to 95% of submitted tissue samples.  There was perineural invasion in all areas.  There was evidence of extraprostatic extension at the left base.  Tyrone's 3+3=6 was found in 2 cores from the right lobe, occupying less than 5% of submitted tissue samples.  Staging imaging studies showed no evidence of metastatic disease.  Nuclear medicine bone scan showed degenerative change in the right SI joint.  CT abdomen pelvis showed no definite evidence of extraprostatic metastasis.  No pathologic lymphadenopathy.    Allergy:   Allergies   Allergen Reactions   • Penicillins Hives       Social History:   Social History     Socioeconomic History   • Marital status:    Tobacco Use   • Smoking status: Former Smoker     Packs/day: 0.50     Types: Cigarettes     Quit date:      Years since quittin.0   • Smokeless tobacco: Never Used   Substance and Sexual Activity   • Alcohol use: Yes     Comment: 2 per day   • Drug use: No       Past Medical History:   Past Medical History:   Diagnosis Date   • Prostate cancer (HCC)         Family History: family history includes Emphysema in his father; Lymphoma in his mother; Stroke in his brother.     Surgical History:   Past Surgical History:   Procedure Laterality Date   • COLONOSCOPY  12/27/2016   • KNEE CARTILAGE SURGERY Left    • PROSTATE BIOPSY          Review of Systems:   Review of Systems   All other systems reviewed and are negative.             IPSS Questionnaire (AUA-7):  Over the past month…    1)  Incomplete Emptying  How often have you had a sensation of not emptying your bladder?  1 - Less than 1 time in 5   2)  Frequency  How often have you had to urinate less than every two hours? 2 - Less than half the time   3)  Intermittency  How often have you found you stopped and started again several times when you urinated?  3 - About half the time   4) Urgency  How often have you found it difficult to postpone urination?  0 - Not at all   5) Weak Stream  How often have you had a weak urinary stream?  2 - Less than half the time   6) Straining  How often have you had to push or strain to begin urination?  0 - Not at all   7) Nocturia  How many times did you typically get up at night to urinate?  3 - 3 times   Total Score:  11       Quality of life due to urinary symptoms:  If you were to spend the rest of your life with your urinary condition the way it is now, how would you feel about that? 0-Delighted   Urine Leakage (Incontinence) 0-No Leakage     Sexual Health Inventory  Current Status    1)  How do you rate your confidence that you could achieve and keep an erection? 2-Low   2) When you had erections with sexual stimulation, how often were your erections hard enough for penetration (entering your partner)? 4-Most times (much more than half the time)   3)  During sexual intercourse, how often were you able to maintain your erection after you had penetrated (entered) into your partner? 5-Almost always or always   4) During sexual intercourse, how difficult was it to maintain your  erection to completion of intercourse? 4-Slightly difficult   5) When you attempted sexual intercourse, how often was it satisfactory to you? 4-Most times (much more than half the time)   Total Score: 24       Bowel Health Inventory  Current Status: 0-No problems, no rectal bleeding, no discharge, less then 5 bowel movements a day           Objective VITAL SGNS:   Vitals:    01/17/22 1300   BP: (!) 189/84   Pulse: 72   Resp: 20   Temp: 97.8 °F (36.6 °C)   TempSrc: Temporal   SpO2: 97%   Weight: 92 kg (202 lb 14.4 oz)        Karnofsky score: 90       Physical Exam:   Physical Exam  Vitals and nursing note reviewed.   Constitutional:       Appearance: He is well-developed.   HENT:      Head: Normocephalic and atraumatic.   Cardiovascular:      Rate and Rhythm: Normal rate and regular rhythm.      Heart sounds: Normal heart sounds. No murmur heard.      Pulmonary:      Effort: Pulmonary effort is normal.      Breath sounds: Normal breath sounds. No wheezing or rales.   Chest:   Breasts:      Right: No supraclavicular adenopathy.      Left: No supraclavicular adenopathy.       Abdominal:      General: Bowel sounds are normal. There is no distension.      Palpations: Abdomen is soft.      Tenderness: There is no abdominal tenderness.   Genitourinary:     Prostate: Enlarged ( 40 cc, induration bilat. more on Left, extending towards base but no definite Seminal Vesicle invasion.). Not tender and no nodules present.      Rectum: No mass, tenderness, external hemorrhoid or internal hemorrhoid. Normal anal tone.   Musculoskeletal:         General: No tenderness. Normal range of motion.      Cervical back: Normal range of motion and neck supple.   Lymphadenopathy:      Cervical: No cervical adenopathy.      Upper Body:      Right upper body: No supraclavicular adenopathy.      Left upper body: No supraclavicular adenopathy.   Skin:     General: Skin is warm and dry.   Neurological:      Mental Status: He is alert and oriented  to person, place, and time.      Sensory: No sensory deficit.   Psychiatric:         Behavior: Behavior normal.         Thought Content: Thought content normal.         Judgment: Judgment normal.              The following portions of the patient's history were reviewed and updated as appropriate: allergies, current medications, past family history, past medical history, past social history, past surgical history and problem list.    Assessment:   Assessment      Prostate cancer, Algona's 4+3=7, clinical stage IIIA (T2c, N0, M0), PSA 53.4 ng/ml.  He has intermediate grade disease extensively involving the left lobe with presence of perineural invasion diffusely and focal evidence of extraprostatic extension of the left base.  PSA value is very high.  Although there is no definite radiographic evidence of metastasis, patient is at higher risk.  Patient is interested in nonsurgical treatment approach.  We reviewed the radiation treatment options.  He would not be a good candidate for stereotactic body radiotherapy or brachytherapy as sole treatment modalities.  He would be a candidate for moderate hypofractionated IMRT radiotherapy and short course androgen ablation with or without focal brachytherapy boost or CyberKnife boost.  Patient to proceed with combined modality hypofractionated IMRT to the pelvis and concurrent androgen ablation.    Informed consent was obtained.    RECOMMENDATIONS: He will return to Dr. Guerrero for placement of gold seed fiducials.  6-month LHRH agonist injection will be arranged.  Patient will subsequently return here for CT simulation.  The prostate gland will receive a dose of 70 Gray delivered in 28 fractions over 5-1/2 weeks using helical Andrew therapy IMRT technique.  Concurrently, the pelvic lymphatics will receive a dose of approximately 50.4 Gray using simultaneous integrated boost technique.    Follow Up:   Return in about 2 weeks (around 1/31/2022) for Office Visit,  Simulation.  Diagnoses and all orders for this visit:    1. Prostate cancer (HCC) (Primary)         Remigio Haines MD

## 2022-02-07 ENCOUNTER — PROCEDURE VISIT (OUTPATIENT)
Dept: UROLOGY | Facility: CLINIC | Age: 72
End: 2022-02-07

## 2022-02-07 VITALS — BODY MASS INDEX: 26.77 KG/M2 | HEART RATE: 67 BPM | OXYGEN SATURATION: 96 % | HEIGHT: 73 IN | WEIGHT: 202 LBS

## 2022-02-07 DIAGNOSIS — C61 PROSTATE CANCER: Primary | ICD-10-CM

## 2022-02-07 LAB
BILIRUB BLD-MCNC: NEGATIVE MG/DL
CLARITY, POC: CLEAR
COLOR UR: YELLOW
EXPIRATION DATE: ABNORMAL
GLUCOSE UR STRIP-MCNC: NEGATIVE MG/DL
KETONES UR QL: NEGATIVE
LEUKOCYTE EST, POC: NEGATIVE
Lab: ABNORMAL
NITRITE UR-MCNC: NEGATIVE MG/ML
PH UR: 6.5 [PH] (ref 5–8)
PROT UR STRIP-MCNC: NEGATIVE MG/DL
RBC # UR STRIP: ABNORMAL /UL
SP GR UR: 1.02 (ref 1–1.03)
UROBILINOGEN UR QL: NORMAL

## 2022-02-07 PROCEDURE — A4648 IMPLANTABLE TISSUE MARKER: HCPCS | Performed by: UROLOGY

## 2022-02-07 PROCEDURE — 76942 ECHO GUIDE FOR BIOPSY: CPT | Performed by: UROLOGY

## 2022-02-07 PROCEDURE — 55876 PLACE RT DEVICE/MARKER PROS: CPT | Performed by: UROLOGY

## 2022-02-07 PROCEDURE — 81003 URINALYSIS AUTO W/O SCOPE: CPT | Performed by: UROLOGY

## 2022-02-07 NOTE — PROGRESS NOTES
Preprocedure diagnosis  Prostate Cancer      Postprocedure diagnosis  Prostate Cancer      Procedure  1. Transrectal Ultrasound Guided Placement of Gold Fiducial Markers  2. Total Number of Markers Placed: 6     Attending surgeon  Catarino Guerrero     Anesthesia  2% lidocaine injected moises-prostatic       Complications  None     Indications  71 year old male with a history of high risk prostate cancer.  Patient was found to have elevated PSA of 48, repeat PSA of 53 prebiopsy.  Prostate biopsy performed demonstrating grade group 3 and 7 out of 7 cores from the left gland with perineural invasion, as well as EPE. Negative staging imaging.  Plan to undergo definitive radiotherapy with Dr. Haines. He presents today for placement of gold fiducial marker to guide radiation therapy planning.  Informed consent was reviewed and signed after discussion of risks, benefits, alternatives.     Findings  Uncomplicated placement of 6 gold fiducial markers, placement performed at left and right lateral base, left and right lateral apex, left and right lateral mid gland.      Procedure  The patient was identified, informed consent was reviewed and signed. Confirmation that the patient has been taking his preprocedure antibiotics was completed.  He was placed in the left lateral position, with knees to chest.  The ultrasound probe was inserted into the patient's rectum.  The prostate was identified.  5 mL of 2% lidocaine was injected along the neurovascular bundle in the left side and a similar  anesthetic injection was performed on the left side.  Starting within the left base of the prostate a marker was placed in the lateral portion of the prostate at the base, mid, apex of the gland. Next, starting in the right base of the prostate a marker was placed in the lateral portion of the prostate at the base, mid, apex of the gland.  A total of 6 gold markers were used.  No significant bleeding was encountered.  The rectal probe was  held in place for 3 minutes to confirm hemostasis.  The rectal probe was removed and there was no significant blood per rectum noted.  The patient tolerated the procedure well denies significant pain or discomfort.     Follow Up:   -Have scheduled 6-month depot Lupron injection 2/16/2022, we have discussed the indication for ADT in the setting of high risk disease in combination with radiotherapy.  This was also discussed by Dr. Remigio Haines.  -F/u with Radiation Oncology as planned for radiation therapy planning  -Return in 3 months with a repeat PSA prior and for continued prostate cancer follow-up  -Return precautions discussed, including significant hematuria, significant blood per rectum, fevers, chills, nausea, vomiting. Patient was instructed to call my office or present to the nearest emergency department with these concerns.

## 2022-02-16 ENCOUNTER — HOSPITAL ENCOUNTER (OUTPATIENT)
Dept: RADIATION ONCOLOGY | Facility: HOSPITAL | Age: 72
Setting detail: RADIATION/ONCOLOGY SERIES
Discharge: HOME | End: 2022-02-16

## 2022-02-16 ENCOUNTER — HOSPITAL ENCOUNTER (OUTPATIENT)
Dept: ONCOLOGY | Facility: HOSPITAL | Age: 72
Setting detail: INFUSION SERIES
Discharge: HOME | End: 2022-02-16

## 2022-02-16 ENCOUNTER — HOSPITAL ENCOUNTER (OUTPATIENT)
Dept: RADIATION ONCOLOGY | Facility: HOSPITAL | Age: 72
Discharge: HOME | End: 2022-02-16

## 2022-02-16 ENCOUNTER — OFFICE VISIT (OUTPATIENT)
Dept: RADIATION ONCOLOGY | Facility: HOSPITAL | Age: 72
End: 2022-02-16

## 2022-02-16 VITALS
HEART RATE: 61 BPM | BODY MASS INDEX: 26.49 KG/M2 | SYSTOLIC BLOOD PRESSURE: 184 MMHG | TEMPERATURE: 97.5 F | DIASTOLIC BLOOD PRESSURE: 91 MMHG | WEIGHT: 199.9 LBS | OXYGEN SATURATION: 96 % | RESPIRATION RATE: 16 BRPM | HEIGHT: 73 IN

## 2022-02-16 VITALS
SYSTOLIC BLOOD PRESSURE: 174 MMHG | BODY MASS INDEX: 26.51 KG/M2 | DIASTOLIC BLOOD PRESSURE: 85 MMHG | TEMPERATURE: 98.5 F | RESPIRATION RATE: 16 BRPM | HEART RATE: 71 BPM | HEIGHT: 73 IN | WEIGHT: 200 LBS

## 2022-02-16 DIAGNOSIS — C61 PROSTATE CANCER: Primary | ICD-10-CM

## 2022-02-16 PROCEDURE — 25010000002 LEUPROLIDE (6 MONTH) PER 7.5 MG: Performed by: UROLOGY

## 2022-02-16 PROCEDURE — G0463 HOSPITAL OUTPT CLINIC VISIT: HCPCS

## 2022-02-16 PROCEDURE — 96402 CHEMO HORMON ANTINEOPL SQ/IM: CPT

## 2022-02-16 PROCEDURE — 96372 THER/PROPH/DIAG INJ SC/IM: CPT

## 2022-02-16 RX ADMIN — LEUPROLIDE ACETATE 45 MG: KIT SUBCUTANEOUS at 13:15

## 2022-02-16 NOTE — PROGRESS NOTES
RE-EVALUATION    PATIENT:                                                      Madi Blanco  :                                                          1950  DATE:                          2022   DIAGNOSIS:     Prostate cancer (HCC)  - Stage IIIA (cT2c, cN0, cM0, PSA: 53.4, Grade Group: 3)         BRIEF HISTORY:  The patient is a very pleasant 71 y.o. male  with clinically localized, higher risk prostate cancer.  He chose to undergo definitive treatment with pelvic irradiation.  He underwent placement of gold seed fiducials last week with minimal post procedure hematuria.  This cleared quickly.  Urinary voiding is back to baseline.  He has had no change in bowel function.  He received a 6-month duration LHRH agonist injection today.  He is here today for simulation.  He has had no other change in health since informed consent was obtained on 2022.  He remains a good candidate for IMRT radiotherapy with concurrent androgen ablation.    Allergies   Allergen Reactions   • Penicillins Hives       Review of Systems   All other systems reviewed and are negative.             IPSS Questionnaire (AUA-7):  Over the past month…     1)  Incomplete Emptying  How often have you had a sensation of not emptying your bladder?  1 - Less than 1 time in 5   2)  Frequency  How often have you had to urinate less than every two hours? 2 - Less than half the time   3)  Intermittency  How often have you found you stopped and started again several times when you urinated?  3 - About half the time   4) Urgency  How often have you found it difficult to postpone urination?  0 - Not at all   5) Weak Stream  How often have you had a weak urinary stream?  2 - Less than half the time   6) Straining  How often have you had to push or strain to begin urination?  0 - Not at all   7) Nocturia  How many times did you typically get up at night to urinate?  3 - 3 times   Total Score:  11         Quality of life due to urinary  "symptoms:  If you were to spend the rest of your life with your urinary condition the way it is now, how would you feel about that? 0-Delighted   Urine Leakage (Incontinence) 0-No Leakage      Sexual Health Inventory  Current Status     1)  How do you rate your confidence that you could achieve and keep an erection? 2-Low   2) When you had erections with sexual stimulation, how often were your erections hard enough for penetration (entering your partner)? 4-Most times (much more than half the time)   3)  During sexual intercourse, how often were you able to maintain your erection after you had penetrated (entered) into your partner? 5-Almost always or always   4) During sexual intercourse, how difficult was it to maintain your erection to completion of intercourse? 4-Slightly difficult   5) When you attempted sexual intercourse, how often was it satisfactory to you? 4-Most times (much more than half the time)   Total Score: 24         Bowel Health Inventory  Current Status: 0-No problems, no rectal bleeding, no discharge, less then 5 bowel movements a day                Objective   VITAL SIGNS:   Vitals:    02/16/22 1404   BP: (!) 184/91   Pulse: 61   Resp: 16   Temp: 97.5 °F (36.4 °C)   SpO2: 96%  Comment: RA   Weight: 90.7 kg (199 lb 14.4 oz)   Height: 185.4 cm (73\")   PainSc: 0-No pain                Physical Exam  Vitals and nursing note reviewed.   Constitutional:       Appearance: He is well-developed.   HENT:      Head: Normocephalic and atraumatic.   Cardiovascular:      Rate and Rhythm: Normal rate and regular rhythm.      Heart sounds: No murmur heard.      Pulmonary:      Effort: Pulmonary effort is normal.      Breath sounds: No wheezing or rales.   Chest:   Breasts:      Right: No supraclavicular adenopathy.      Left: No supraclavicular adenopathy.       Abdominal:      General: There is no distension.      Palpations: Abdomen is soft.      Tenderness: There is no abdominal tenderness. "   Musculoskeletal:         General: No tenderness. Normal range of motion.      Cervical back: Normal range of motion and neck supple.   Lymphadenopathy:      Cervical: No cervical adenopathy.      Upper Body:      Right upper body: No supraclavicular adenopathy.      Left upper body: No supraclavicular adenopathy.   Skin:     General: Skin is warm and dry.   Neurological:      Mental Status: He is alert and oriented to person, place, and time.      Sensory: No sensory deficit.   Psychiatric:         Behavior: Behavior normal.         Thought Content: Thought content normal.         Judgment: Judgment normal.              The following portions of the patient's history were reviewed and updated as appropriate: allergies, current medications, past family history, past medical history, past social history, past surgical history and problem list.    Diagnoses and all orders for this visit:    Prostate cancer (HCC)      IMPRESSION:  Prostate cancer, Javan's 4+3=7, clinical stage IIIA (T2c, N0, M0), PSA 53.4 ng/ml.  He tolerated gold seed fiducial placement well.  He initiated 6-month LHRH agonist androgen suppression.    RECOMMENDATIONS: CT simulation will be performed today.  The prostate gland will receive a dose of 70 Gray delivered in 28 fractions over 5-1/2 weeks.  The pelvic lymphatics will receive a minimum dose of 50 Gray concurrently.       Remigio Haines MD     Approximately 15 minutes was spent with patient and reviewing records.

## 2022-02-22 PROCEDURE — 77338 DESIGN MLC DEVICE FOR IMRT: CPT | Performed by: RADIOLOGY

## 2022-02-22 PROCEDURE — 77300 RADIATION THERAPY DOSE PLAN: CPT | Performed by: RADIOLOGY

## 2022-02-22 PROCEDURE — 77301 RADIOTHERAPY DOSE PLAN IMRT: CPT | Performed by: RADIOLOGY

## 2022-03-09 ENCOUNTER — HOSPITAL ENCOUNTER (OUTPATIENT)
Dept: RADIATION ONCOLOGY | Facility: HOSPITAL | Age: 72
Setting detail: RADIATION/ONCOLOGY SERIES
Discharge: HOME | End: 2022-03-09

## 2022-03-09 ENCOUNTER — HOSPITAL ENCOUNTER (OUTPATIENT)
Dept: RADIATION ONCOLOGY | Facility: HOSPITAL | Age: 72
Discharge: HOME | End: 2022-03-09

## 2022-03-09 VITALS — BODY MASS INDEX: 26.43 KG/M2 | WEIGHT: 200.3 LBS

## 2022-03-09 PROCEDURE — 77385: CPT | Performed by: RADIOLOGY

## 2022-03-10 ENCOUNTER — HOSPITAL ENCOUNTER (OUTPATIENT)
Dept: RADIATION ONCOLOGY | Facility: HOSPITAL | Age: 72
Discharge: HOME | End: 2022-03-10

## 2022-03-10 PROCEDURE — 77385: CPT | Performed by: RADIOLOGY

## 2022-03-11 ENCOUNTER — HOSPITAL ENCOUNTER (OUTPATIENT)
Dept: RADIATION ONCOLOGY | Facility: HOSPITAL | Age: 72
Discharge: HOME | End: 2022-03-11

## 2022-03-11 PROCEDURE — 77336 RADIATION PHYSICS CONSULT: CPT | Performed by: RADIOLOGY

## 2022-03-11 PROCEDURE — 77385: CPT | Performed by: RADIOLOGY

## 2022-03-14 ENCOUNTER — HOSPITAL ENCOUNTER (OUTPATIENT)
Dept: RADIATION ONCOLOGY | Facility: HOSPITAL | Age: 72
Discharge: HOME | End: 2022-03-14

## 2022-03-14 PROCEDURE — 77385: CPT | Performed by: RADIOLOGY

## 2022-03-15 ENCOUNTER — HOSPITAL ENCOUNTER (OUTPATIENT)
Dept: RADIATION ONCOLOGY | Facility: HOSPITAL | Age: 72
Discharge: HOME | End: 2022-03-15

## 2022-03-15 ENCOUNTER — DOCUMENTATION (OUTPATIENT)
Dept: NUTRITION | Facility: HOSPITAL | Age: 72
End: 2022-03-15

## 2022-03-15 VITALS — WEIGHT: 199.5 LBS | BODY MASS INDEX: 26.32 KG/M2

## 2022-03-15 PROCEDURE — 77385: CPT | Performed by: RADIOLOGY

## 2022-03-15 NOTE — PROGRESS NOTES
[facility]    Diagnosis:  Stage IIIA (cT2c, cN0, cM0 prostate cancer / high risk  Radiation:  IMRT radiotherapy with concurrent androgen ablation / 70 Quintero delivered in 28 fractions over 5-1/2 weeks.  The pelvic lymphatics will receive a minimum dose of 50 Gray concurrently - 5/28 fractions completed    Weight 199.5 lbs    Initial consultation with patient providing patient education for possible nutritionally related side effects of treatment including gas, bowel changes.  Stress importance of sustaining weight, focusing on protein intake, hydration to combat fatigue.  Provided written patient education for the Gas Elimination Diet and high protein foods.  Will follow.

## 2022-03-16 ENCOUNTER — HOSPITAL ENCOUNTER (OUTPATIENT)
Dept: RADIATION ONCOLOGY | Facility: HOSPITAL | Age: 72
Discharge: HOME | End: 2022-03-16

## 2022-03-16 PROCEDURE — 77385: CPT | Performed by: RADIOLOGY

## 2022-03-17 ENCOUNTER — HOSPITAL ENCOUNTER (OUTPATIENT)
Dept: RADIATION ONCOLOGY | Facility: HOSPITAL | Age: 72
Discharge: HOME | End: 2022-03-17

## 2022-03-17 PROCEDURE — 77385: CPT | Performed by: RADIOLOGY

## 2022-03-17 PROCEDURE — 77336 RADIATION PHYSICS CONSULT: CPT | Performed by: RADIOLOGY

## 2022-03-18 ENCOUNTER — HOSPITAL ENCOUNTER (OUTPATIENT)
Dept: RADIATION ONCOLOGY | Facility: HOSPITAL | Age: 72
Discharge: HOME | End: 2022-03-18

## 2022-03-18 PROCEDURE — 77385: CPT | Performed by: RADIOLOGY

## 2022-03-21 ENCOUNTER — HOSPITAL ENCOUNTER (OUTPATIENT)
Dept: RADIATION ONCOLOGY | Facility: HOSPITAL | Age: 72
Discharge: HOME | End: 2022-03-21

## 2022-03-21 PROCEDURE — 77385: CPT | Performed by: RADIOLOGY

## 2022-03-22 ENCOUNTER — HOSPITAL ENCOUNTER (OUTPATIENT)
Dept: RADIATION ONCOLOGY | Facility: HOSPITAL | Age: 72
Discharge: HOME | End: 2022-03-22

## 2022-03-22 VITALS — WEIGHT: 197.7 LBS | BODY MASS INDEX: 26.08 KG/M2

## 2022-03-22 PROCEDURE — 77385: CPT | Performed by: RADIOLOGY

## 2022-03-22 RX ORDER — ALFUZOSIN HYDROCHLORIDE 10 MG/1
10 TABLET, EXTENDED RELEASE ORAL DAILY
Qty: 30 TABLET | Refills: 1 | Status: SHIPPED | OUTPATIENT
Start: 2022-03-22 | End: 2022-06-24 | Stop reason: SDUPTHER

## 2022-03-23 ENCOUNTER — HOSPITAL ENCOUNTER (OUTPATIENT)
Dept: RADIATION ONCOLOGY | Facility: HOSPITAL | Age: 72
Discharge: HOME | End: 2022-03-23

## 2022-03-23 PROCEDURE — 77385: CPT | Performed by: RADIOLOGY

## 2022-03-24 ENCOUNTER — HOSPITAL ENCOUNTER (OUTPATIENT)
Dept: RADIATION ONCOLOGY | Facility: HOSPITAL | Age: 72
Discharge: HOME | End: 2022-03-24

## 2022-03-24 PROCEDURE — 77385: CPT | Performed by: RADIOLOGY

## 2022-03-25 ENCOUNTER — HOSPITAL ENCOUNTER (OUTPATIENT)
Dept: RADIATION ONCOLOGY | Facility: HOSPITAL | Age: 72
Discharge: HOME | End: 2022-03-25

## 2022-03-25 PROCEDURE — 77385: CPT | Performed by: RADIOLOGY

## 2022-03-25 PROCEDURE — 77336 RADIATION PHYSICS CONSULT: CPT | Performed by: RADIOLOGY

## 2022-03-28 ENCOUNTER — HOSPITAL ENCOUNTER (OUTPATIENT)
Dept: RADIATION ONCOLOGY | Facility: HOSPITAL | Age: 72
Discharge: HOME | End: 2022-03-28

## 2022-03-28 PROCEDURE — 77385: CPT | Performed by: RADIOLOGY

## 2022-03-29 ENCOUNTER — HOSPITAL ENCOUNTER (OUTPATIENT)
Dept: RADIATION ONCOLOGY | Facility: HOSPITAL | Age: 72
Discharge: HOME | End: 2022-03-29

## 2022-03-29 VITALS — WEIGHT: 200.7 LBS | BODY MASS INDEX: 26.48 KG/M2

## 2022-03-29 PROCEDURE — 77385: CPT | Performed by: RADIOLOGY

## 2022-03-30 ENCOUNTER — HOSPITAL ENCOUNTER (OUTPATIENT)
Dept: RADIATION ONCOLOGY | Facility: HOSPITAL | Age: 72
Discharge: HOME | End: 2022-03-30

## 2022-03-30 PROCEDURE — 77385: CPT | Performed by: RADIOLOGY

## 2022-03-31 ENCOUNTER — HOSPITAL ENCOUNTER (OUTPATIENT)
Dept: RADIATION ONCOLOGY | Facility: HOSPITAL | Age: 72
Discharge: HOME | End: 2022-03-31

## 2022-03-31 PROCEDURE — 77385: CPT | Performed by: RADIOLOGY

## 2022-04-01 ENCOUNTER — HOSPITAL ENCOUNTER (OUTPATIENT)
Dept: RADIATION ONCOLOGY | Facility: HOSPITAL | Age: 72
Discharge: HOME | End: 2022-04-01

## 2022-04-01 ENCOUNTER — HOSPITAL ENCOUNTER (OUTPATIENT)
Dept: RADIATION ONCOLOGY | Facility: HOSPITAL | Age: 72
Setting detail: RADIATION/ONCOLOGY SERIES
Discharge: HOME | End: 2022-04-01

## 2022-04-01 PROCEDURE — 77385: CPT | Performed by: RADIOLOGY

## 2022-04-01 PROCEDURE — 77336 RADIATION PHYSICS CONSULT: CPT | Performed by: RADIOLOGY

## 2022-04-04 ENCOUNTER — HOSPITAL ENCOUNTER (OUTPATIENT)
Dept: RADIATION ONCOLOGY | Facility: HOSPITAL | Age: 72
Discharge: HOME | End: 2022-04-04

## 2022-04-04 PROCEDURE — 77385: CPT | Performed by: RADIOLOGY

## 2022-04-05 ENCOUNTER — HOSPITAL ENCOUNTER (OUTPATIENT)
Dept: RADIATION ONCOLOGY | Facility: HOSPITAL | Age: 72
Discharge: HOME | End: 2022-04-05

## 2022-04-05 VITALS — BODY MASS INDEX: 26.72 KG/M2 | WEIGHT: 202.5 LBS

## 2022-04-05 PROCEDURE — 77385: CPT | Performed by: RADIOLOGY

## 2022-04-06 ENCOUNTER — HOSPITAL ENCOUNTER (OUTPATIENT)
Dept: RADIATION ONCOLOGY | Facility: HOSPITAL | Age: 72
Discharge: HOME | End: 2022-04-06

## 2022-04-06 PROCEDURE — 77385: CPT | Performed by: RADIOLOGY

## 2022-04-07 ENCOUNTER — HOSPITAL ENCOUNTER (OUTPATIENT)
Dept: RADIATION ONCOLOGY | Facility: HOSPITAL | Age: 72
Discharge: HOME | End: 2022-04-07

## 2022-04-07 PROCEDURE — 77385: CPT | Performed by: RADIOLOGY

## 2022-04-07 PROCEDURE — 77336 RADIATION PHYSICS CONSULT: CPT | Performed by: RADIOLOGY

## 2022-04-08 ENCOUNTER — HOSPITAL ENCOUNTER (OUTPATIENT)
Dept: RADIATION ONCOLOGY | Facility: HOSPITAL | Age: 72
Discharge: HOME | End: 2022-04-08

## 2022-04-08 PROCEDURE — 77385: CPT | Performed by: RADIOLOGY

## 2022-04-11 ENCOUNTER — HOSPITAL ENCOUNTER (OUTPATIENT)
Dept: RADIATION ONCOLOGY | Facility: HOSPITAL | Age: 72
Discharge: HOME | End: 2022-04-11

## 2022-04-11 PROCEDURE — 77385: CPT | Performed by: RADIOLOGY

## 2022-04-12 ENCOUNTER — DOCUMENTATION (OUTPATIENT)
Dept: NUTRITION | Facility: HOSPITAL | Age: 72
End: 2022-04-12

## 2022-04-12 ENCOUNTER — HOSPITAL ENCOUNTER (OUTPATIENT)
Dept: RADIATION ONCOLOGY | Facility: HOSPITAL | Age: 72
Discharge: HOME | End: 2022-04-12

## 2022-04-12 VITALS — BODY MASS INDEX: 26.44 KG/M2 | WEIGHT: 200.4 LBS

## 2022-04-12 PROCEDURE — 77385: CPT | Performed by: RADIOLOGY

## 2022-04-12 NOTE — PROGRESS NOTES
[facility]     Diagnosis:  Stage IIIA (cT2c, cN0, cM0 prostate cancer / high risk  Radiation:  IMRT radiotherapy with concurrent androgen ablation / 70 Quintero delivered in 28 fractions over 5-1/2 weeks.  The pelvic lymphatics will receive a minimum dose of 50 Gray concurrently - 25/28 fractions completed     Weight 200.4 lbs    Continues with nocturia frequently throughout the night; given the urination patient education sheet. Experiencing more gas with the progression of treatment; trying to avoid the gas causing foods and bought 250 mg GAS X for management.  Advised taking no more than 2 tablets per day, one in the morning and one in the evening.  Patient with 3 remaining treatments.

## 2022-04-13 ENCOUNTER — HOSPITAL ENCOUNTER (OUTPATIENT)
Dept: RADIATION ONCOLOGY | Facility: HOSPITAL | Age: 72
Discharge: HOME | End: 2022-04-13

## 2022-04-13 PROCEDURE — 77385: CPT | Performed by: RADIOLOGY

## 2022-04-14 ENCOUNTER — HOSPITAL ENCOUNTER (OUTPATIENT)
Dept: RADIATION ONCOLOGY | Facility: HOSPITAL | Age: 72
Discharge: HOME | End: 2022-04-14

## 2022-04-14 PROCEDURE — 77385: CPT | Performed by: RADIOLOGY

## 2022-04-15 ENCOUNTER — HOSPITAL ENCOUNTER (OUTPATIENT)
Dept: RADIATION ONCOLOGY | Facility: HOSPITAL | Age: 72
Discharge: HOME | End: 2022-04-15

## 2022-04-15 PROCEDURE — 77336 RADIATION PHYSICS CONSULT: CPT | Performed by: RADIOLOGY

## 2022-04-18 ENCOUNTER — HOSPITAL ENCOUNTER (OUTPATIENT)
Dept: RADIATION ONCOLOGY | Facility: HOSPITAL | Age: 72
Discharge: HOME | End: 2022-04-18

## 2022-04-18 DIAGNOSIS — C61 PROSTATE CANCER: Primary | ICD-10-CM

## 2022-04-18 PROCEDURE — 77385: CPT | Performed by: RADIOLOGY

## 2022-05-23 ENCOUNTER — HOSPITAL ENCOUNTER (OUTPATIENT)
Dept: RADIATION ONCOLOGY | Facility: HOSPITAL | Age: 72
Setting detail: RADIATION/ONCOLOGY SERIES
Discharge: HOME | End: 2022-05-23

## 2022-05-23 ENCOUNTER — OFFICE VISIT (OUTPATIENT)
Dept: RADIATION ONCOLOGY | Facility: HOSPITAL | Age: 72
End: 2022-05-23

## 2022-05-23 DIAGNOSIS — C61 PROSTATE CANCER: Primary | ICD-10-CM

## 2022-05-23 NOTE — PROGRESS NOTES
TELEMEDICINE FOLLOW UP NOTE    PATIENT:                                                      Madi Blanco  MEDICAL RECORD #:                        [id number]  :                                                          1950  COMPLETION DATE:   2022  DIAGNOSIS:     Prostate cancer (HCC)  - Stage IIIA (cT2c, cN0, cM0, PSA: 53.4, Grade Group: 3)    This visit has been converted to a telehealth virtual visit.  Total time of discussion was 18 minutes.  The patient has given verbal consent.    COVID-19 RISK SCREEN     1. Has the patient had close contact or exposure without PPE with a lab confirmed COVID-19 (+) person or a person under investigation (PUI) for COVID-19 infection?  -- No     2. Has the patient had respiratory symptoms, worsened/new cough and/or SOA, unexplained fever, or sudden loss of smell and/or taste in the past week? --  No    3. Has the patient completed COVID vaccination? --  Unknown         BRIEF HISTORY:    Initial follow-up visit.  He has clinically localized, high risk prostate cancer.  He underwent definitive treatment with a course of combined modality hypofractionated IMRT to the pelvis and concurrent androgen ablation.  The prostate gland received a dose of 70 Gray delivered in 28 fractions.  Concurrently, the pelvic lymphatics received a minimum dose of 50.4 Gray.  He tolerated treatment well.  He developed brief dysuria, which resolved after about 1 week with use of OTC Azo.  He reports chronic moderate outflow obstructive symptoms were acutely exacerbated, citing nocturia every hour and increased urinary urgency as predominant symptoms.  He reports symptoms are overall improving, and strength of urine stream now stronger.  He reports switching back from alfuzosin to longstanding use of tamsulosin nightly.  No hematuria.  No incontinence.  He developed frequent, small volume stools, which have resolved.  He reports bowel function is normal at this point.  He did develop mild  posttreatment fatigue, which he believes is in part due to frequent nocturia.  He is tolerating hormone therapy despite frequent, tolerable hot flashes.  No new aches or pains.  Overall, he feels well.      IPSS Questionnaire (AUA-7):  Over the past month…    1)  Incomplete Emptying  How often have you had a sensation of not emptying your bladder?  2 - Less than half the time   2)  Frequency  How often have you had to urinate less than every two hours? 3 - About half the time   3)  Intermittency  How often have you found you stopped and started again several times when you urinated?  3 - About half the time   4) Urgency  How often have you found it difficult to postpone urination?  4 - More than half the time   5) Weak Stream  How often have you had a weak urinary stream?  2 - Less than half the time   6) Straining  How often have you had to push or strain to begin urination?  0 - Not at all   7) Nocturia  How many times did you typically get up at night to urinate?  5 - 5+ times   Total Score:  19       Quality of life due to urinary symptoms:  If you were to spend the rest of your life with your urinary condition the way it is now, how would you feel about that? 3-Mixed   Urine Leakage (Incontinence) 0-No Leakage     Sexual Health Inventory  Current Status    1)  How do you rate your confidence that you could achieve and keep an erection? 1-Very Low   2) When you had erections with sexual stimulation, how often were your erections hard enough for penetration (entering your partner)? 0-No sexual activity   3)  During sexual intercourse, how often were you able to maintain your erection after you had penetrated (entered) into your partner? 0-Did not attempt intercourse   4) During sexual intercourse, how difficult was it to maintain your erection to completion of intercourse? 0-Did not attempt intercourse   5) When you attempted sexual intercourse, how often was it satisfactory to you? 0-No sexual activity   Total  Score: 1       Bowel Health Inventory  Current Status: 0-No problems, no rectal bleeding, no discharge, less then 5 bowel movements a day       MEDICATIONS: Medication reconciliation for the patient was reviewed and confirmed in the electronic medical record.    Review of Systems   Constitutional: Positive for fatigue.   Endocrine: Positive for hot flashes.   Genitourinary: Positive for frequency and nocturia.    All other systems reviewed and are negative.          KPS 90%      Physical Exam  Pulmonary:      Respirations even, unlabored. No audible wheezing or cough.  Neurological:      A+Ox4, conversant, answers questions appropriately.  Psychiatric:     Judgement, affect, and decision-making WNL.    Limited physical exam as visit was conducted remotely via telephone in light of the COVID-19 pandemic.        The following portions of the patient's history were reviewed and updated as appropriate: allergies, current medications, past family history, past medical history, past social history, past surgical history and problem list.         Diagnoses and all orders for this visit:    1. Prostate cancer (HCC) (Primary)         IMPRESSION:  Prostate cancer, Aline's 4+3=7, clinical stage IIIA (T2c, N0, M0), PSA 53.4 ng/ml.  1 month status post moderate hypofractionated IMRT radiotherapy and short course androgen ablation.  He tolerated treatment well.    The patient continues on tamsulosin for now.  Hopefully, he may be able to eventually wean/discontinue alpha-blocker as acute grade 1 radiation-related urinary toxicities subside and with the clinical downsizing of his prostate.    Mr. Blanco and I have reviewed the survivorship care plan in detail.  We discussed diagnosis, follow-up intervals, PSA monitoring, and expectations for response to treatment.  A copy of the care plan has been mailed to the patient.  A copy has also been sent to his PCP.    RECOMMENDATIONS:   Mr. Blanco continues urologic surveillance and  routine PSA monitoring under the care of Dr. Guerrero.  He will be due for repeat PSA in July 2022.  We will follow-up with him in 6 months.      Return in about 6 months (around 11/23/2022) for Office Visit.    LYNNETTE Casanova spent a total of 35 minutes on todays visit, with more than 18 minutes in virtual communication with the patient via telephone, and the remainder of the time spent in reviewing the relevant history, records, available imaging, and for documentation.

## 2022-06-27 RX ORDER — ALFUZOSIN HYDROCHLORIDE 10 MG/1
10 TABLET, EXTENDED RELEASE ORAL DAILY
Qty: 30 TABLET | Refills: 11 | Status: SHIPPED | OUTPATIENT
Start: 2022-06-27 | End: 2022-12-16

## 2022-08-17 ENCOUNTER — HOSPITAL ENCOUNTER (OUTPATIENT)
Dept: ONCOLOGY | Facility: HOSPITAL | Age: 72
Setting detail: INFUSION SERIES
Discharge: HOME | End: 2022-08-17

## 2022-08-17 VITALS
WEIGHT: 206 LBS | SYSTOLIC BLOOD PRESSURE: 184 MMHG | TEMPERATURE: 96.7 F | HEART RATE: 64 BPM | RESPIRATION RATE: 18 BRPM | BODY MASS INDEX: 27.9 KG/M2 | HEIGHT: 72 IN | DIASTOLIC BLOOD PRESSURE: 92 MMHG

## 2022-08-17 DIAGNOSIS — C61 PROSTATE CANCER: Primary | ICD-10-CM

## 2022-08-17 PROCEDURE — 25010000002 LEUPROLIDE (6 MONTH) PER 7.5 MG: Performed by: UROLOGY

## 2022-08-17 PROCEDURE — 96372 THER/PROPH/DIAG INJ SC/IM: CPT

## 2022-08-17 PROCEDURE — 96402 CHEMO HORMON ANTINEOPL SQ/IM: CPT

## 2022-08-17 RX ADMIN — LEUPROLIDE ACETATE 45 MG: KIT SUBCUTANEOUS at 14:02

## 2022-08-23 ENCOUNTER — LAB (OUTPATIENT)
Dept: LAB | Facility: HOSPITAL | Age: 72
End: 2022-08-23

## 2022-08-23 ENCOUNTER — OFFICE VISIT (OUTPATIENT)
Dept: UROLOGY | Facility: CLINIC | Age: 72
End: 2022-08-23

## 2022-08-23 VITALS — HEIGHT: 72 IN | BODY MASS INDEX: 27.9 KG/M2 | WEIGHT: 206 LBS

## 2022-08-23 DIAGNOSIS — C61 PROSTATE CANCER: Primary | ICD-10-CM

## 2022-08-23 DIAGNOSIS — C61 PROSTATE CANCER: ICD-10-CM

## 2022-08-23 PROCEDURE — 99213 OFFICE O/P EST LOW 20 MIN: CPT | Performed by: UROLOGY

## 2022-08-23 PROCEDURE — 84153 ASSAY OF PSA TOTAL: CPT

## 2022-08-23 PROCEDURE — 84154 ASSAY OF PSA FREE: CPT

## 2022-08-23 PROCEDURE — 36415 COLL VENOUS BLD VENIPUNCTURE: CPT

## 2022-08-23 NOTE — PROGRESS NOTES
Follow Up Office Visit      Patient Name: Madi Blanco  : 1950   MRN: [id number]     Chief Complaint: History of prostate cancer    History of Present Illness: Madi Blanco is a 72 y.o. male who presents today for 3-month follow-up with PSA.  The patient has a history of stage IIIa, T2 cN0 M0 grade group 3 prostate cancer.  Prebiopsy PSA 53.4.  He ha undergone combination ofs androgen deprivation with radiotherapy.  He has recently completed CyberKnife radiotherapy with Dr. Remigio Haines.  Presents today at first 3-month interval with PSA.  He denies significant lower urinary tract symptoms at this time.    Subjective      Review of System: Review of Systems   Constitutional: Negative for chills, fatigue, fever and unexpected weight change.   HENT: Negative for sore throat.    Eyes: Negative for visual disturbance.   Respiratory: Negative for cough, chest tightness and shortness of breath.    Cardiovascular: Negative for chest pain and leg swelling.   Gastrointestinal: Negative for blood in stool, constipation, diarrhea, nausea, rectal pain and vomiting.   Genitourinary: Positive for frequency. Negative for decreased urine volume, difficulty urinating, dysuria, enuresis, flank pain, genital sores, hematuria and urgency.   Musculoskeletal: Negative for back pain and joint swelling.   Skin: Negative for rash and wound.   Neurological: Negative for seizures, speech difficulty, weakness and headaches.   Psychiatric/Behavioral: Negative for confusion, sleep disturbance and suicidal ideas. The patient is not nervous/anxious.       I have reviewed the ROS documented by my clinical staff, updated as appropriate and I agree. Catarino Guerrero MD    I have reviewed and the following portions of the patient's history were updated as appropriate: past family history, past medical history, past social history, past surgical history and problem list.    Medications:     Current Outpatient Medications:   •  alfuzosin  "(UROXATRAL) 10 MG 24 hr tablet, Take 1 tablet by mouth Daily., Disp: 30 tablet, Rfl: 11  •  Cholecalciferol (Vitamin D-3) 5000 UNIT/ML liquid, Place  under the tongue., Disp: , Rfl:   •  fluticasone (FLONASE) 50 MCG/ACT nasal spray, 2 sprays into the nostril(s) as directed by provider Daily., Disp: , Rfl:     Allergies:   Allergies   Allergen Reactions   • Penicillins Hives       Objective     Physical Exam:   Vital Signs:   Vitals:    08/23/22 0859   Weight: 93.4 kg (206 lb)   Height: 182.9 cm (72.01\")   PainSc: 0-No pain     Body mass index is 27.93 kg/m².     Physical Exam  Vitals and nursing note reviewed.   Constitutional:       Appearance: Normal appearance.   HENT:      Head: Normocephalic and atraumatic.   Cardiovascular:      Comments: Well perfused  Pulmonary:      Effort: Pulmonary effort is normal.   Abdominal:      General: Abdomen is flat.      Palpations: Abdomen is soft.   Musculoskeletal:         General: Normal range of motion.   Skin:     General: Skin is warm and dry.   Neurological:      General: No focal deficit present.      Mental Status: He is alert and oriented to person, place, and time. Mental status is at baseline.   Psychiatric:         Mood and Affect: Mood normal.         Behavior: Behavior normal.         Thought Content: Thought content normal.         Judgment: Judgment normal.            Labs:   Brief Urine Lab Results  (Last result in the past 365 days)      Color   Clarity   Blood   Leuk Est   Nitrite   Protein   CREAT   Urine HCG        02/07/22 1219 Yellow   Clear   1+   Negative   Negative   Negative                      Lab Results   Component Value Date    GLUCOSE 88 10/22/2021    CALCIUM 9.6 10/22/2021     10/22/2021    K 4.4 10/22/2021    CO2 28.0 10/22/2021     10/22/2021    BUN 13 10/22/2021    CREATININE 1.05 10/22/2021    EGFRIFNONA 70 10/22/2021    BCR 12.4 10/22/2021    ANIONGAP 9.0 10/22/2021       Lab Results   Component Value Date    WBC 4.25 " 10/22/2021    HGB 17.2 10/22/2021    HCT 51.4 (H) 10/22/2021    MCV 95.9 10/22/2021     10/22/2021       Images:   No Images in the past 120 days found..    Measures:   Tobacco:   Madi Blanco  reports that he quit smoking about 45 years ago. His smoking use included cigarettes. He smoked 0.50 packs per day. He has never used smokeless tobacco.. I have educated him on the risk of diseases from using tobacco products     Assessment / Plan      Assessment/Plan:   72 y.o. male presents today for 3-month follow-up with PSA.  The patient has a history of stage IIIa, T2 cN0 M0 grade group 3 prostate cancer.  Prebiopsy PSA 53.4.  He ha undergone combination ofs androgen deprivation with radiotherapy.  He has recently completed CyberKnife radiotherapy with Dr. Remigio Haines.  Presents today at first 3-month interval with PSA.  He denies significant lower urinary tract symptoms at this time.    Unfortunately, PSA was not obtained prior to visit.  We will order PSA today and he will follow-up in 1 to 2 weeks for discussion of PSA value.  He will then require scheduling of every 3 to 6-month interval PSA follow-up.    Diagnoses and all orders for this visit:    1. Prostate cancer (HCC) (Primary)  -     PSA Total+% Free (Serial); Future         Follow Up:   Return in about 2 weeks (around 9/6/2022) for Follow up after Labs.     I spent approximately 20 minutes providing clinical care for this patient; including review of patient's chart and provider documentation, face to face time spent with patient in examination room (obtaining history, performing physical exam, discussing diagnosis and management options), placing orders, and completing patient documentation.     Catarino Guerrero MD  [facility]

## 2022-08-25 LAB
PSA FREE MFR SERPL: 18.1 %
PSA FREE SERPL-MCNC: 0.49 NG/ML
PSA SERPL-MCNC: 2.7 NG/ML (ref 0–4)

## 2022-09-13 ENCOUNTER — OFFICE VISIT (OUTPATIENT)
Dept: UROLOGY | Facility: CLINIC | Age: 72
End: 2022-09-13

## 2022-09-13 VITALS — BODY MASS INDEX: 27.9 KG/M2 | HEIGHT: 72 IN | WEIGHT: 206 LBS

## 2022-09-13 DIAGNOSIS — C61 PROSTATE CANCER: Primary | ICD-10-CM

## 2022-09-13 PROCEDURE — 99214 OFFICE O/P EST MOD 30 MIN: CPT | Performed by: UROLOGY

## 2022-09-13 NOTE — PROGRESS NOTES
Follow Up Office Visit      Patient Name: Madi Blanco  : 1950   MRN: [id number]     Chief Complaint: History of high risk prostate cancer    History of Present Illness: Madi Blanco is a 72 y.o. male who presents today for 3-month follow-up with PSA.  He was recently diagnosed NCCN high risk prostate cancer.  PSA of 48.4 recheck 53.4 resulting in prostate biopsy 2021.  Surgical prostate pathology demonstrating grade group 3, [place] 4+3 prostate cancer in 7 of 7 cores taken from the left gland.  Additionally there is evidence of VTE with grade group 3 prostate cancer identified in periprosthetic tissue from a core biopsy in the left base.  The patient was evaluated by Dr. Remigio Haines of radiation oncology and is on undergone combination androgen deprivation, CyberKnife radiotherapy.  CyberKnife radiotherapy completed in 2022.  He presents today for first 3-month evaluation with PSA.  Last androgen deprivation infusion in 2022.  He reports mild irritative lower urinary tract symptoms.  He reports intermittent hot flash but denies other significant symptoms today.    Subjective      Review of System: Review of Systems   Constitutional: Negative for chills, fatigue, fever and unexpected weight change.   HENT: Negative for sore throat.    Eyes: Negative for visual disturbance.   Respiratory: Negative for cough, chest tightness and shortness of breath.    Cardiovascular: Negative for chest pain and leg swelling.   Gastrointestinal: Negative for blood in stool, constipation, diarrhea, nausea, rectal pain and vomiting.   Genitourinary: Positive for frequency and urgency. Negative for decreased urine volume, difficulty urinating, dysuria, enuresis, flank pain, genital sores and hematuria.   Musculoskeletal: Negative for back pain and joint swelling.   Skin: Negative for rash and wound.   Neurological: Negative for seizures, speech difficulty, weakness and headaches.   Psychiatric/Behavioral:  "Negative for confusion, sleep disturbance and suicidal ideas. The patient is not nervous/anxious.       I have reviewed the ROS documented by my clinical staff, updated as appropriate and I agree. Catarino Guerrero MD    I have reviewed and the following portions of the patient's history were updated as appropriate: past family history, past medical history, past social history, past surgical history and problem list.    Medications:     Current Outpatient Medications:   •  Cholecalciferol (Vitamin D-3) 5000 UNIT/ML liquid, Place  under the tongue., Disp: , Rfl:   •  fluticasone (FLONASE) 50 MCG/ACT nasal spray, 2 sprays into the nostril(s) as directed by provider Daily., Disp: , Rfl:   •  alfuzosin (UROXATRAL) 10 MG 24 hr tablet, Take 1 tablet by mouth Daily., Disp: 30 tablet, Rfl: 11    Allergies:   Allergies   Allergen Reactions   • Penicillins Hives         Objective     Physical Exam:   Vital Signs:   Vitals:    09/13/22 0845   Weight: 93.4 kg (206 lb)   Height: 182.9 cm (72.01\")   PainSc: 0-No pain     Body mass index is 27.93 kg/m².     Physical Exam  Vitals and nursing note reviewed.   Constitutional:       Appearance: Normal appearance.   HENT:      Head: Normocephalic and atraumatic.   Cardiovascular:      Comments: Well perfused  Pulmonary:      Effort: Pulmonary effort is normal.   Abdominal:      General: Abdomen is flat.      Palpations: Abdomen is soft.   Musculoskeletal:         General: Normal range of motion.   Skin:     General: Skin is warm and dry.   Neurological:      General: No focal deficit present.      Mental Status: He is alert and oriented to person, place, and time. Mental status is at baseline.   Psychiatric:         Mood and Affect: Mood normal.         Behavior: Behavior normal.         Thought Content: Thought content normal.         Judgment: Judgment normal.           Labs:   Brief Urine Lab Results  (Last result in the past 365 days)      Color   Clarity   Blood   Leuk Est   Nitrite "   Protein   CREAT   Urine HCG        02/07/22 1219 Yellow   Clear   1+   Negative   Negative   Negative                      Lab Results   Component Value Date    GLUCOSE 88 10/22/2021    CALCIUM 9.6 10/22/2021     10/22/2021    K 4.4 10/22/2021    CO2 28.0 10/22/2021     10/22/2021    BUN 13 10/22/2021    CREATININE 1.05 10/22/2021    EGFRIFNONA 70 10/22/2021    BCR 12.4 10/22/2021    ANIONGAP 9.0 10/22/2021       Lab Results   Component Value Date    WBC 4.25 10/22/2021    HGB 17.2 10/22/2021    HCT 51.4 (H) 10/22/2021    MCV 95.9 10/22/2021     10/22/2021       Images:   No Images in the past 120 days found..    Measures:   Tobacco:   Madi Blanco  reports that he quit smoking about 45 years ago. His smoking use included cigarettes. He smoked 0.50 packs per day. He has never used smokeless tobacco.. I have educated him on the risk of diseases from using tobacco products.    Assessment / Plan      Assessment/Plan:   72 y.o. male presents today for 3-month follow-up with PSA.  He was recently diagnosed NCCN high risk prostate cancer.  PSA of 48.4 recheck 53.4 resulting in prostate biopsy 11/22/2021.  Surgical prostate pathology demonstrating grade group 3, [place] 4+3 prostate cancer in 7 of 7 cores taken from the left gland.  Additionally there is evidence of VTE with grade group 3 prostate cancer identified in periprosthetic tissue from a core biopsy in the left base.  The patient was evaluated by Dr. Remigio Haines of radiation oncology and is on undergone combination androgen deprivation, CyberKnife radiotherapy.  CyberKnife radiotherapy completed in 5/2022.  He presents today for first 3-month evaluation with PSA.  Last androgen deprivation infusion in 8/2022.  He reports mild irritative lower urinary tract symptoms.  He reports intermittent hot flash but denies other significant symptoms today.    Pretreatment PSA 53.4.  His PSA today after treatment and androgen deprivation has decreased  to 2.7.  We have discussed the indication for continued surveillance of PSA.  Discussed the indication that PSA will likely continue to decrease and we will identify PSA jhonny.  He will follow-up in 3 months for repeat PSA for closer evaluation due to high risk prostate cancer.  He has scheduled follow-up with Dr. Carrera in 12/2022.  He will require androgen deprivation for 2 to 3 years due to high risk nature of prostate cancer.  He is understanding agreeable.    Diagnoses and all orders for this visit:    1. Prostate cancer (HCC) (Primary)  -     PSA Total+% Free (Serial); Future         Follow Up:   Return in about 3 months (around 12/13/2022) for Recheck, Follow up after Labs.     I spent approximately 30 minutes providing clinical care for this patient; including review of patient's chart and provider documentation, face to face time spent with patient in examination room (obtaining history, performing physical exam, discussing diagnosis and management options), placing orders, and completing patient documentation.     Catarino Guerrero MD  [facility] [place]

## 2022-10-17 NOTE — TELEPHONE ENCOUNTER
Patient called informed that his insurance stopped covering his Viagra 100 mg, but will cover Cialis.   none

## 2022-11-25 ENCOUNTER — LAB (OUTPATIENT)
Dept: LAB | Facility: HOSPITAL | Age: 72
End: 2022-11-25

## 2022-11-25 DIAGNOSIS — C61 PROSTATE CANCER: ICD-10-CM

## 2022-11-25 PROCEDURE — 84153 ASSAY OF PSA TOTAL: CPT

## 2022-11-25 PROCEDURE — 84154 ASSAY OF PSA FREE: CPT

## 2022-11-25 PROCEDURE — 36415 COLL VENOUS BLD VENIPUNCTURE: CPT

## 2022-11-28 LAB
PSA FREE MFR SERPL: 16.4 %
PSA FREE SERPL-MCNC: 1.72 NG/ML
PSA SERPL-MCNC: 10.5 NG/ML (ref 0–4)

## 2022-12-01 ENCOUNTER — HOSPITAL ENCOUNTER (OUTPATIENT)
Dept: RADIATION ONCOLOGY | Facility: HOSPITAL | Age: 72
Setting detail: RADIATION/ONCOLOGY SERIES
Discharge: HOME | End: 2022-12-01
Payer: MEDICARE

## 2022-12-01 ENCOUNTER — TELEPHONE (OUTPATIENT)
Dept: FAMILY MEDICINE CLINIC | Facility: CLINIC | Age: 72
End: 2022-12-01

## 2022-12-01 ENCOUNTER — DOCUMENTATION (OUTPATIENT)
Dept: RADIATION ONCOLOGY | Facility: HOSPITAL | Age: 72
End: 2022-12-01

## 2022-12-01 ENCOUNTER — OFFICE VISIT (OUTPATIENT)
Dept: RADIATION ONCOLOGY | Facility: HOSPITAL | Age: 72
End: 2022-12-01

## 2022-12-01 VITALS
OXYGEN SATURATION: 97 % | HEIGHT: 73 IN | HEART RATE: 59 BPM | RESPIRATION RATE: 16 BRPM | WEIGHT: 209.8 LBS | DIASTOLIC BLOOD PRESSURE: 95 MMHG | BODY MASS INDEX: 27.8 KG/M2 | SYSTOLIC BLOOD PRESSURE: 190 MMHG | TEMPERATURE: 97.3 F

## 2022-12-01 DIAGNOSIS — C61 PROSTATE CANCER: ICD-10-CM

## 2022-12-01 DIAGNOSIS — R97.20 ELEVATED PROSTATE SPECIFIC ANTIGEN (PSA): Primary | ICD-10-CM

## 2022-12-01 DIAGNOSIS — C61 PROSTATE CANCER: Primary | ICD-10-CM

## 2022-12-01 PROCEDURE — G0463 HOSPITAL OUTPT CLINIC VISIT: HCPCS

## 2022-12-01 RX ORDER — TAMSULOSIN HYDROCHLORIDE 0.4 MG/1
1 CAPSULE ORAL DAILY
COMMUNITY
End: 2022-12-16

## 2022-12-01 NOTE — PROGRESS NOTES
RADIATION ONCOLOGY PROGRESS NOTE  12/01/22    There were no vitals filed for this visit.    Rechecked /88, Talked with RN at Dr. Crawford office to notify of elevated BP while here in office and to request appointment. Nurse is going to put a message to MD and will contatct pt.  Mr. Blanco notified that Dr. Crawford's office will call and if not he needs to follow up tomorrow.

## 2022-12-01 NOTE — TELEPHONE ENCOUNTER
NURSE FROM DR CAMPO OFFICE (ONCOLOGY) CALLED TO REPORT THAT THE PATIENTS BLOOD PRESSURE IS RUNNING HIGH. LAST CHECK 190/95  WANTED TO REPORT IT AND SEE IF HE NEEDS SOMETHING CALLED IN FOR IT OR BE WORKED IN ASA.    PLEASE CALL THE PATIENT AND LET HIM KNOW/    [phone]

## 2022-12-01 NOTE — PROGRESS NOTES
FOLLOW UP NOTE    PATIENT:                                                      Madi Blanco  MEDICAL RECORD #:                        [id number]  :                                                          1950  COMPLETION DATE:   2022  DIAGNOSIS:     Prostate cancer (HCC)  - Stage IIIA (cT2c, cN0, cM0, PSA: 53.4, Grade Group: 3)      BRIEF HISTORY:    Routine follow-up visit.  He has a history of high risk prostate cancer treated with pelvic irradiation and concurrent androgen ablation.  He continues androgen suppressive hormonal therapy.  He experiences hot flashes and decreased libido.  He has no urinary outflow symptoms.  Bowels are normal.  No aches or pains.  PSA decreased to a value 2.7 ng/ml on 2022 but then increased to a value 10.5 ng/ml on 2022.    MEDICATIONS: Medication reconciliation for the patient was reviewed and confirmed in the electronic medical record.    Review of Systems   Genitourinary: Positive for nocturia.        Karnofsky score: 90     Physical Exam  Vitals and nursing note reviewed.   Constitutional:       Appearance: He is well-developed.   HENT:      Head: Normocephalic and atraumatic.   Cardiovascular:      Rate and Rhythm: Normal rate and regular rhythm.      Heart sounds: Normal heart sounds. No murmur heard.  Pulmonary:      Effort: Pulmonary effort is normal.      Breath sounds: Normal breath sounds. No wheezing or rales.   Abdominal:      General: Bowel sounds are normal. There is no distension.      Palpations: Abdomen is soft.      Tenderness: There is no abdominal tenderness.   Genitourinary:     Prostate: Not enlarged ( Small, smooth, soft, no nodules or induration), not tender and no nodules present.      Rectum: No mass, tenderness, external hemorrhoid or internal hemorrhoid. Normal anal tone.   Musculoskeletal:         General: No tenderness. Normal range of motion.      Cervical back: Normal range of motion and neck supple.   Lymphadenopathy:       "Cervical: No cervical adenopathy.      Upper Body:      Right upper body: No supraclavicular adenopathy.      Left upper body: No supraclavicular adenopathy.   Skin:     General: Skin is warm and dry.   Neurological:      Mental Status: He is alert and oriented to person, place, and time.      Sensory: No sensory deficit.   Psychiatric:         Behavior: Behavior normal.         Thought Content: Thought content normal.         Judgment: Judgment normal.         VITAL SIGNS:   Vitals:    12/01/22 1451   BP: (!) 190/95   Pulse: 59   Resp: 16   Temp: 97.3 °F (36.3 °C)   SpO2: 97%  Comment: RA   Weight: 95.2 kg (209 lb 12.8 oz)   Height: 185.4 cm (73\")   PainSc: 0-No pain     IPSS Questionnaire (AUA-7):  Over the past month…    1)  Incomplete Emptying  How often have you had a sensation of not emptying your bladder?  2 - Less than half the time   2)  Frequency  How often have you had to urinate less than every two hours? 4 - More than half the time   3)  Intermittency  How often have you found you stopped and started again several times when you urinated?  2 - Less than half the time   4) Urgency  How often have you found it difficult to postpone urination?  0 - Not at all   5) Weak Stream  How often have you had a weak urinary stream?  4 - More than half the time   6) Straining  How often have you had to push or strain to begin urination?  0 - Not at all   7) Nocturia  How many times did you typically get up at night to urinate?  5 - 5+ times   Total Score:  1717       Quality of life due to urinary symptoms:  If you were to spend the rest of your life with your urinary condition the way it is now, how would you feel about that? 2-Mostly Satisfied   Urine Leakage (Incontinence) 0-No Leakage     Sexual Health Inventory  Current Status    1)  How do you rate your confidence that you could achieve and keep an erection? 1-Very Low   2) When you had erections with sexual stimulation, how often were your erections hard " enough for penetration (entering your partner)? 4-Most times (much more than half the time)   3)  During sexual intercourse, how often were you able to maintain your erection after you had penetrated (entered) into your partner? 3-Sometimes (about half the time)   4) During sexual intercourse, how difficult was it to maintain your erection to completion of intercourse? 3-Difficult   5) When you attempted sexual intercourse, how often was it satisfactory to you? 5-Almost always or always   Total Score: 0       Bowel Health Inventory  Current Status: 0-No problems, no rectal bleeding, no discharge, less then 5 bowel movements a day                       Karnofsky score: 90         The following portions of the patient's history were reviewed and updated as appropriate: allergies, current medications, past family history, past medical history, past social history, past surgical history and problem list.         Diagnoses and all orders for this visit:    1. Prostate cancer (HCC) (Primary)         IMPRESSION:  Prostate cancer, [place] 4+3=7, clinical stage IIIA (T2c, N0, M0), pretreatment PSA 53.4 ng/ml.  He is more than 7 months status post pelvic irradiation with moderate hypofractionated IMRT and androgen ablation.  He is clinically doing very well with no urinary tract symptoms and he has had a very good clinical response based on AQUILES.  PSA jhonny was 2.7 ng/ml but has recently increased significantly to a value of 10.5 ng/ml on 11/25/2022.  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    Hypertension.  Patient believes this is due to situational stress.    RECOMMENDATIONS: PSMA PET/CT evaluation.  Reevaluation after that.  He also has follow-up with Dr. Guerrero later this month.    We will assist with quick follow-up with his primary physician to evaluate his hypertension.    I spent a total of 25 minutes on todays visit, with more than 20 minutes in direct face to face  communication, and the remainder of the time spent in reviewing the relevant history, records, available imaging, and for documentation.    Return in about 3 weeks (around 12/22/2022) for Imaging - See orders, Office Visit.    Remigio Haines MD

## 2022-12-02 RX ORDER — BENAZEPRIL HYDROCHLORIDE 20 MG/1
20 TABLET ORAL DAILY
Qty: 30 TABLET | Refills: 5 | Status: SHIPPED | OUTPATIENT
Start: 2022-12-02 | End: 2022-12-19 | Stop reason: SDUPTHER

## 2022-12-13 ENCOUNTER — OFFICE VISIT (OUTPATIENT)
Dept: UROLOGY | Facility: CLINIC | Age: 72
End: 2022-12-13

## 2022-12-13 ENCOUNTER — TELEPHONE (OUTPATIENT)
Dept: FAMILY MEDICINE CLINIC | Facility: CLINIC | Age: 72
End: 2022-12-13

## 2022-12-13 ENCOUNTER — TELEPHONE (OUTPATIENT)
Dept: RADIATION ONCOLOGY | Facility: HOSPITAL | Age: 72
End: 2022-12-13

## 2022-12-13 VITALS — WEIGHT: 209 LBS | BODY MASS INDEX: 27.7 KG/M2 | HEIGHT: 73 IN

## 2022-12-13 DIAGNOSIS — C61 PROSTATE CANCER: Primary | ICD-10-CM

## 2022-12-13 DIAGNOSIS — R97.21 INCREASING PROSTATE SPECIFIC ANTIGEN (PSA) LEVEL AFTER TREATMENT FOR MALIGNANT NEOPLASM OF PROSTATE: Primary | ICD-10-CM

## 2022-12-13 DIAGNOSIS — J40 BRONCHITIS: Primary | ICD-10-CM

## 2022-12-13 PROCEDURE — 99213 OFFICE O/P EST LOW 20 MIN: CPT | Performed by: UROLOGY

## 2022-12-13 NOTE — TELEPHONE ENCOUNTER
Pt notified of appt with Dr. Haines on 1/4/2023 @ 6992 to go over the results of his scan.  Pt verbalized understanding.

## 2022-12-13 NOTE — PROGRESS NOTES
Prostate Cancer Office Visit      Patient Name: Madi Blanco  : 1950   MRN: [id number]     Chief Complaint:  Prostate Cancer.     History of Present Illness: Madi Blanco is a 72 y.o. male who presents today for continued follow-up due to high risk prostate cancer.  He was diagnosed with stage IIIa, T2, N0, M0 grade group 3 prostate cancer.  Prebiopsy PSA 53.4.  He underwent combination androgen ablation and radiotherapy with Dr. Remigio Haines..  He was seen in 2022 for first 3-month PSA follow-up after radiation treatment, PSA had decreased to 2.7.  He returns today for 3-month follow-up PSA.  PSA has been identified increased to 10.5.    Prior to therapy the patient had significantly elevated PSA.  He did have staging imaging with CT abdomen and pelvis and nuclear medicine bone scan without evidence of metastatic disease.    He has recently seen Dr. Haines on 2022.  Plan for PSMA imaging study based upon increasing PSA.    Today, he denies bother associated with lower urinary tract symptoms.  Denies bone pain, fatigue, nausea, emesis      Subjective      Review of System: Review of Systems   Genitourinary: Negative for decreased urine volume, difficulty urinating, dysuria, enuresis, flank pain, frequency, hematuria and urgency.      I have reviewed the ROS documented by my clinical staff, I have updated appropriately and I agree. Catarino Guerrero MD    Past Medical History:   Past Medical History:   Diagnosis Date   • History of radiation therapy 2022    Prostate/pelvis   • Prostate cancer (HCC)        Past Surgical History:   Past Surgical History:   Procedure Laterality Date   • COLONOSCOPY  2016   • KNEE CARTILAGE SURGERY Left    • PROSTATE BIOPSY     • PROSTATE FIDUCIAL MARKER PLACEMENT         Family History:   Family History   Problem Relation Age of Onset   • Lymphoma Mother    • Emphysema Father    • Stroke Brother        Social History:   Social History     Socioeconomic  "History   • Marital status:    Tobacco Use   • Smoking status: Former     Packs/day: 0.50     Types: Cigarettes     Quit date:      Years since quittin.9   • Smokeless tobacco: Never   Vaping Use   • Vaping Use: Never used   Substance and Sexual Activity   • Alcohol use: Yes     Comment: 2 per day   • Drug use: No   • Sexual activity: Defer       Medications:     Current Outpatient Medications:   •  benazepril (LOTENSIN) 20 MG tablet, Take 1 tablet by mouth Daily., Disp: 30 tablet, Rfl: 5  •  Cholecalciferol (Vitamin D-3) 5000 UNIT/ML liquid, Place  under the tongue., Disp: , Rfl:   •  fluticasone (FLONASE) 50 MCG/ACT nasal spray, 2 sprays into the nostril(s) as directed by provider Daily., Disp: , Rfl:   •  tamsulosin (FLOMAX) 0.4 MG capsule 24 hr capsule, Take 1 capsule by mouth Daily., Disp: , Rfl:   •  alfuzosin (UROXATRAL) 10 MG 24 hr tablet, Take 1 tablet by mouth Daily., Disp: 30 tablet, Rfl: 11  •  HYDROcod Polst-CPM Polst ER (Tussionex Pennkinetic ER) 10-8 MG/5ML ER suspension, Take 5 mL by mouth Every 12 (Twelve) Hours As Needed for Cough or Rhinitis., Disp: 120 mL, Rfl: 0    Allergies:   Allergies   Allergen Reactions   • Penicillins Hives         Objective     Physical Exam:   Vital Signs:   Vitals:    22 0935   Weight: 94.8 kg (209 lb)   Height: 185.4 cm (72.99\")   PainSc: 0-No pain     Body mass index is 27.58 kg/m².     Physical Exam  Vitals and nursing note reviewed.   Constitutional:       Appearance: Normal appearance.   HENT:      Head: Normocephalic and atraumatic.   Cardiovascular:      Comments: Well perfused  Pulmonary:      Effort: Pulmonary effort is normal.   Abdominal:      General: Abdomen is flat.      Palpations: Abdomen is soft.   Musculoskeletal:         General: Normal range of motion.   Skin:     General: Skin is warm and dry.   Neurological:      General: No focal deficit present.      Mental Status: He is alert and oriented to person, place, and time. Mental " status is at baseline.   Psychiatric:         Mood and Affect: Mood normal.         Behavior: Behavior normal.         Thought Content: Thought content normal.         Judgment: Judgment normal.             Labs:   Lab Results   Component Value Date    PSA 10.5 (H) 11/25/2022    PSA 2.7 08/23/2022    PSA 53.4 (H) 11/11/2021       Lab Results   Component Value Date    WBC 4.25 10/22/2021    HGB 17.2 10/22/2021    HCT 51.4 (H) 10/22/2021    MCV 95.9 10/22/2021     10/22/2021       Lab Results   Component Value Date    GLUCOSE 88 10/22/2021    BUN 13 10/22/2021    CREATININE 1.05 10/22/2021    EGFRIFNONA 70 10/22/2021    BCR 12.4 10/22/2021    K 4.4 10/22/2021    CO2 28.0 10/22/2021    CALCIUM 9.6 10/22/2021    ALBUMIN 4.60 10/22/2021    AST 19 10/22/2021    ALT 16 10/22/2021       Images:   No Images in the past 120 days found..    Measures:   Tobacco:   Madi Blanco  reports that he quit smoking about 45 years ago. His smoking use included cigarettes. He smoked an average of .5 packs per day. He has never used smokeless tobacco.. I have educated him on the risk of diseases from using tobacco products.    Assessment / Plan      Assessment:   Mr. Madi Blanco is a 72 y.o. presents today for continued follow-up due to high risk prostate cancer.  He was diagnosed with stage IIIa, T2, N0, M0 grade group 3 prostate cancer.  Prebiopsy PSA 53.4.  He underwent combination androgen ablation and radiotherapy with Dr. Remigio Haines..  He was seen in 8/2022 for first 3-month PSA follow-up after radiation treatment, PSA had decreased to 2.7.  He returns today for 3-month follow-up PSA.  PSA has been identified increased to 10.5. Prior to therapy the patient had significantly elevated PSA.  He did have staging imaging with CT abdomen and pelvis and nuclear medicine bone scan without evidence of metastatic disease.He has recently seen Dr. Haines on 12/1/2022.  Plan for PSMA imaging study based upon increasing PSA.  Today  we have discussed increasing PSA value.  We have discussed the indication for continued evaluation, agree with PSM study has been ordered by Dr. Haines.  We will plan to have patient follow-up after imaging, close follow-up as he is also seeing Dr. Haines in 1/2023.      Diagnoses and all orders for this visit:    1. Prostate cancer (HCC) (Primary)         Follow Up:   Return in about 4 weeks (around 1/10/2023) for Recheck.    I spent approximately 20 minutes providing clinical care for this patient; including review of patient's chart and provider documentation, face to face time spent with patient in examination room (obtaining history, performing physical exam, discussing diagnosis and management options), placing orders, and completing patient documentation.     Catarino Guerrero MD  [facility] [place]

## 2022-12-13 NOTE — TELEPHONE ENCOUNTER
"Spoke with pharmacist, they wanted to get the OK to dispense a smaller amount, since that is a 12 day supply; they can only give a 7 day supply, I gave the OK. They also wanted to double check with you that it's ok to dispense this med since pt is \"opiod naive\" ? He's never had controlled narcotics, according to leila. I gave them ok, since it's a short supply with 0 refills.  "

## 2022-12-13 NOTE — TELEPHONE ENCOUNTER
Pharmacy Name: [facility] [address] [place] [address] [facility] [phone] [facility] [phone]      Pharmacy representative name: SANDOR    Pharmacy representative phone number: [phone]    What medication are you calling in regards to:     HYDROCOD POLST CPM    What question does the pharmacy have: DOSAGE    Who is the provider that prescribed the medication: DR PINEDO    Additional notes:

## 2022-12-13 NOTE — TELEPHONE ENCOUNTER
Pt presenting in the office after an appt downstairs reporting that he's had a runny nose, cough, congestion for 3 days. Has been taking Nyquil, rosalva seltzer plus in day with no relief. Please send a cough syrup and something to dry up his sinuses to [facility] [address]

## 2022-12-15 DIAGNOSIS — C61 PROSTATE CANCER: ICD-10-CM

## 2022-12-15 DIAGNOSIS — R97.21 INCREASING PROSTATE SPECIFIC ANTIGEN (PSA) LEVEL AFTER TREATMENT FOR MALIGNANT NEOPLASM OF PROSTATE: Primary | ICD-10-CM

## 2022-12-16 ENCOUNTER — OFFICE VISIT (OUTPATIENT)
Dept: FAMILY MEDICINE CLINIC | Facility: CLINIC | Age: 72
End: 2022-12-16

## 2022-12-16 VITALS
DIASTOLIC BLOOD PRESSURE: 62 MMHG | HEIGHT: 73 IN | OXYGEN SATURATION: 99 % | SYSTOLIC BLOOD PRESSURE: 136 MMHG | BODY MASS INDEX: 28.1 KG/M2 | WEIGHT: 212 LBS | TEMPERATURE: 97 F | HEART RATE: 69 BPM

## 2022-12-16 DIAGNOSIS — I10 PRIMARY HYPERTENSION: Primary | ICD-10-CM

## 2022-12-16 DIAGNOSIS — J40 BRONCHITIS: ICD-10-CM

## 2022-12-16 PROCEDURE — 99213 OFFICE O/P EST LOW 20 MIN: CPT | Performed by: FAMILY MEDICINE

## 2022-12-16 PROCEDURE — 93000 ELECTROCARDIOGRAM COMPLETE: CPT | Performed by: FAMILY MEDICINE

## 2022-12-16 NOTE — PROGRESS NOTES
Subjective   Madi Blanco is a 72 y.o. male    Chief Complaint    Hypertension  Cough    History of Present Illness    The patient is complaining of elevated blood pressure readings on and off for the past year. On 12/01/2022, his blood pressure reading was 190/95 mmHg. After a phone call to this office, he was started on benazepril 20 mg daily. His blood pressure today is 136/62 mmHg.    Per patient report, his home monitor is giving him readings much higher than we get here in the office. He states that he feels that his monitor may have to be calibrated. He adds that his home readings are showing his systolic more in the 190 mmHg range. He explains that his brother had a very bad stroke and the patient does not want to go through the same thing.    The patient reports that the cough medication he has been taking is improving his symptoms.  The patient inquires about the safety of taking all of his medications while taking the cough medication.    The patients electrocardiogram revealed evidence of an enlargement of his heart.      The following portions of the patient's history were reviewed and updated as appropriate: allergies, current medications, past social history and problem list    Review of Systems   Constitutional: Negative for chills, fatigue, fever and unexpected weight change.   HENT: Negative.    Respiratory: Positive for wheezing. Negative for cough, chest tightness and shortness of breath.    Cardiovascular: Negative for chest pain, palpitations and leg swelling.   Gastrointestinal: Negative for nausea.   Skin: Negative for color change and rash.   Neurological: Negative for dizziness, syncope, weakness and headaches.       Objective     Vitals:    12/16/22 1054   BP: 136/62   Pulse: 69   Temp: 97 °F (36.1 °C)   SpO2: 99%       Physical Exam  Vitals and nursing note reviewed.   Constitutional:       General: He is not in acute distress.     Appearance: Normal appearance. He is well-developed. He  is not ill-appearing, toxic-appearing or diaphoretic.   HENT:      Head: Normocephalic and atraumatic.      Nose: Nose normal.   Neck:      Vascular: No carotid bruit or JVD.   Cardiovascular:      Rate and Rhythm: Normal rate and regular rhythm.      Pulses: Normal pulses.      Heart sounds: Normal heart sounds. No murmur heard.  Pulmonary:      Effort: Pulmonary effort is normal. No respiratory distress.      Breath sounds: Normal breath sounds. No stridor.   Abdominal:      Palpations: Abdomen is soft.      Tenderness: There is no abdominal tenderness.   Musculoskeletal:      Cervical back: Neck supple.   Lymphadenopathy:      Cervical: No cervical adenopathy.   Skin:     General: Skin is warm and dry.   Neurological:      Mental Status: He is alert.       ECG 12 Lead    Date/Time: 12/18/2022 2:12 PM  Performed by: Bharathi Bryan MD  Authorized by: Bharathi Bryan MD   Comparison: not compared with previous ECG   Rhythm: sinus rhythm  Rate: normal  Conduction: conduction normal  ST Segments: ST segments normal  T Waves: T waves normal  QRS axis: left  Other findings: left ventricular hypertrophy    Clinical impression: non-specific ECG  Comments: LAD  LVH            Assessment & Plan     Problems Addressed this Visit    None  Visit Diagnoses     Primary hypertension    -  Primary    Bronchitis          Diagnoses       Codes Comments    Primary hypertension    -  Primary ICD-10-CM: I10  ICD-9-CM: 401.9     Bronchitis     ICD-10-CM: J40  ICD-9-CM: 490         Continue benazepril.  Antitussive prescription.    I spent 20 minutes in patient care: Reviewing records prior to the visit, examining the patient, entering orders and documentation    Part of this note may be an electronic transcription/translation of spoken language to printed text using the Dragon Dictation System.           Transcribed from ambient dictation for LCAIRE Bryan MD by Tanja Andre.  12/16/22   12:43  EST  Patient or patient representative verbalized consent to the visit recording.  I have personally performed the services described in this document as transcribed by the above individual, and it is both accurate and complete.

## 2022-12-19 NOTE — TELEPHONE ENCOUNTER
Caller: Madi Blanco    Relationship: Self    Best call back number: [phone]    Requested Prescriptions:   Requested Prescriptions     Pending Prescriptions Disp Refills   • benazepril (LOTENSIN) 20 MG tablet 30 tablet 5     Sig: Take 1 tablet by mouth Daily.    TAMSULOSIN 0.4 MG CAPSULES    Pharmacy where request should be sent: [address] - [phone]  - [phone] [facility]     Additional details provided by patient: PATIENT HAS CALLED REQUESTING NEW PRESCRIPTIONS ON ABOVE MEDICATIONS    Does the patient have less than a 3 day supply:  [] Yes  [x] No    Would you like a call back once the refill request has been completed: [] Yes [x] No    If the office needs to give you a call back, can they leave a voicemail: [] Yes [x] No    Tu Haq   12/19/22 10:50 EST

## 2022-12-20 RX ORDER — TAMSULOSIN HYDROCHLORIDE 0.4 MG/1
1 CAPSULE ORAL NIGHTLY
Qty: 90 CAPSULE | Refills: 3 | Status: SHIPPED | OUTPATIENT
Start: 2022-12-20 | End: 2023-03-02 | Stop reason: SDUPTHER

## 2022-12-20 RX ORDER — BENAZEPRIL HYDROCHLORIDE 20 MG/1
20 TABLET ORAL DAILY
Qty: 90 TABLET | Refills: 3 | Status: SHIPPED | OUTPATIENT
Start: 2022-12-20 | End: 2023-01-20

## 2022-12-28 ENCOUNTER — HOSPITAL ENCOUNTER (OUTPATIENT)
Dept: PET IMAGING | Facility: HOSPITAL | Age: 72
Discharge: HOME | End: 2022-12-28

## 2022-12-28 ENCOUNTER — HOSPITAL ENCOUNTER (OUTPATIENT)
Dept: PET IMAGING | Facility: HOSPITAL | Age: 72
Discharge: HOME | End: 2022-12-28
Payer: MEDICARE

## 2022-12-28 DIAGNOSIS — C61 PROSTATE CANCER: ICD-10-CM

## 2022-12-28 DIAGNOSIS — R97.21 INCREASING PROSTATE SPECIFIC ANTIGEN (PSA) LEVEL AFTER TREATMENT FOR MALIGNANT NEOPLASM OF PROSTATE: ICD-10-CM

## 2022-12-28 PROCEDURE — 78815 PET IMAGE W/CT SKULL-THIGH: CPT

## 2022-12-28 PROCEDURE — A9595 PIFLUFOLASTAT F 18 9 MCI SOLUTION PREFILLED SYRINGE: HCPCS | Performed by: RADIOLOGY

## 2022-12-28 PROCEDURE — 0 PIFLUFOLASTAT F 18 9 MCI SOLUTION PREFILLED SYRINGE: Performed by: RADIOLOGY

## 2022-12-28 RX ADMIN — PIFLUFOLASTAT F-18 1 DOSE: 80 INJECTION INTRAVENOUS at 13:43

## 2023-01-04 ENCOUNTER — OFFICE VISIT (OUTPATIENT)
Dept: RADIATION ONCOLOGY | Facility: HOSPITAL | Age: 73
End: 2023-01-04
Payer: MEDICARE

## 2023-01-04 ENCOUNTER — TELEPHONE (OUTPATIENT)
Dept: FAMILY MEDICINE CLINIC | Facility: CLINIC | Age: 73
End: 2023-01-04
Payer: MEDICARE

## 2023-01-04 ENCOUNTER — HOSPITAL ENCOUNTER (OUTPATIENT)
Dept: RADIATION ONCOLOGY | Facility: HOSPITAL | Age: 73
Setting detail: RADIATION/ONCOLOGY SERIES
Discharge: HOME | End: 2023-01-04
Payer: MEDICARE

## 2023-01-04 VITALS
TEMPERATURE: 97.1 F | HEART RATE: 69 BPM | SYSTOLIC BLOOD PRESSURE: 195 MMHG | BODY MASS INDEX: 27.23 KG/M2 | RESPIRATION RATE: 20 BRPM | WEIGHT: 206.4 LBS | DIASTOLIC BLOOD PRESSURE: 92 MMHG | OXYGEN SATURATION: 98 %

## 2023-01-04 DIAGNOSIS — C61 PROSTATE CANCER: Primary | ICD-10-CM

## 2023-01-04 DIAGNOSIS — C79.51 BONE METASTASES: ICD-10-CM

## 2023-01-04 LAB
ALBUMIN SERPL-MCNC: 4.3 G/DL (ref 3.5–5.2)
ALBUMIN/GLOB SERPL: 1.5 G/DL
ALP SERPL-CCNC: 118 U/L (ref 39–117)
ALT SERPL W P-5'-P-CCNC: 19 U/L (ref 1–41)
ANION GAP SERPL CALCULATED.3IONS-SCNC: 9.5 MMOL/L (ref 5–15)
AST SERPL-CCNC: 19 U/L (ref 1–40)
BASOPHILS # BLD AUTO: 0.08 10*3/MM3 (ref 0–0.2)
BASOPHILS NFR BLD AUTO: 1.5 % (ref 0–1.5)
BILIRUB SERPL-MCNC: 0.3 MG/DL (ref 0–1.2)
BUN SERPL-MCNC: 20 MG/DL (ref 8–23)
BUN/CREAT SERPL: 18.9 (ref 7–25)
CALCIUM SPEC-SCNC: 9.9 MG/DL (ref 8.6–10.5)
CHLORIDE SERPL-SCNC: 101 MMOL/L (ref 98–107)
CO2 SERPL-SCNC: 28.5 MMOL/L (ref 22–29)
CREAT SERPL-MCNC: 1.06 MG/DL (ref 0.76–1.27)
DEPRECATED RDW RBC AUTO: 43.8 FL (ref 37–54)
EGFRCR SERPLBLD CKD-EPI 2021: 75.5 ML/MIN/1.73
EOSINOPHIL # BLD AUTO: 0.25 10*3/MM3 (ref 0–0.4)
EOSINOPHIL NFR BLD AUTO: 4.7 % (ref 0.3–6.2)
ERYTHROCYTE [DISTWIDTH] IN BLOOD BY AUTOMATED COUNT: 12.8 % (ref 12.3–15.4)
GLOBULIN UR ELPH-MCNC: 2.8 GM/DL
GLUCOSE SERPL-MCNC: 91 MG/DL (ref 65–99)
HCT VFR BLD AUTO: 41.9 % (ref 37.5–51)
HGB BLD-MCNC: 14.6 G/DL (ref 13–17.7)
IMM GRANULOCYTES # BLD AUTO: 0.04 10*3/MM3 (ref 0–0.05)
IMM GRANULOCYTES NFR BLD AUTO: 0.8 % (ref 0–0.5)
LYMPHOCYTES # BLD AUTO: 0.85 10*3/MM3 (ref 0.7–3.1)
LYMPHOCYTES NFR BLD AUTO: 16.1 % (ref 19.6–45.3)
MCH RBC QN AUTO: 32.2 PG (ref 26.6–33)
MCHC RBC AUTO-ENTMCNC: 34.8 G/DL (ref 31.5–35.7)
MCV RBC AUTO: 92.3 FL (ref 79–97)
MONOCYTES # BLD AUTO: 0.43 10*3/MM3 (ref 0.1–0.9)
MONOCYTES NFR BLD AUTO: 8.1 % (ref 5–12)
NEUTROPHILS NFR BLD AUTO: 3.64 10*3/MM3 (ref 1.7–7)
NEUTROPHILS NFR BLD AUTO: 68.8 % (ref 42.7–76)
NRBC BLD AUTO-RTO: 0 /100 WBC (ref 0–0.2)
PLATELET # BLD AUTO: 251 10*3/MM3 (ref 140–450)
PMV BLD AUTO: 10.1 FL (ref 6–12)
POTASSIUM SERPL-SCNC: 4.8 MMOL/L (ref 3.5–5.2)
PROT SERPL-MCNC: 7.1 G/DL (ref 6–8.5)
PSA SERPL-MCNC: 35.1 NG/ML (ref 0–4)
RBC # BLD AUTO: 4.54 10*6/MM3 (ref 4.14–5.8)
SODIUM SERPL-SCNC: 139 MMOL/L (ref 136–145)
TESTOST SERPL-MCNC: <2.5 NG/DL (ref 193–740)
WBC NRBC COR # BLD: 5.29 10*3/MM3 (ref 3.4–10.8)

## 2023-01-04 PROCEDURE — 80053 COMPREHEN METABOLIC PANEL: CPT | Performed by: RADIOLOGY

## 2023-01-04 PROCEDURE — 84403 ASSAY OF TOTAL TESTOSTERONE: CPT | Performed by: RADIOLOGY

## 2023-01-04 PROCEDURE — 85025 COMPLETE CBC W/AUTO DIFF WBC: CPT | Performed by: RADIOLOGY

## 2023-01-04 PROCEDURE — G0463 HOSPITAL OUTPT CLINIC VISIT: HCPCS

## 2023-01-04 PROCEDURE — 84153 ASSAY OF PSA TOTAL: CPT | Performed by: RADIOLOGY

## 2023-01-04 NOTE — TELEPHONE ENCOUNTER
THIS PATIENT'S BLOOD PRESSURE /92 TODAY AT ANOTHER DOCTORS OFFICE. THEY ADVISED HIM TO CALL ABOUT HIS BLOOD PRESSURE MEDICATION.

## 2023-01-04 NOTE — TELEPHONE ENCOUNTER
Increase benazepril 20 mg to 2 tablets daily.  He is currently on 1 tablet daily.  Schedule appointment here in 2 to 3 weeks.

## 2023-01-04 NOTE — LETTER
2023     Catarino Guerrero MD  [address]   [address]  [facility] [address]    Patient: Madi Blanco   YOB: 1950   Date of Visit: 2023       Dear Catarino Guerrero MD    Madi Blanco was in my office today. Below is a copy of my note.    If you have questions, please do not hesitate to call me. I look forward to following Madi along with you.         Sincerely,        Remigio Haines MD        CC: Bharathi Bryan MD    RE-EVALUATION    PATIENT:                                                      Madi Blanco  :                                                          1950  DATE:                          2023   DIAGNOSIS:     Prostate cancer (HCC)  - Stage IIIA (cT2c, cN0, cM0, PSA: 53.4, Grade Group: 3)        BRIEF HISTORY:  The patient is a very pleasant 72 y.o. male  with a history of high risk prostate cancer treated with pelvic irradiation and concurrent androgen ablation.  He continues androgen suppressive hormonal therapy.  He experiences hot flashes and decreased libido.  He has no urinary outflow symptoms has been experiencing more urinary frequency and nocturia since starting diuretic blood pressure medication for hypertension blood pressure values have remained elevated and he has not yet returned to his primary physician to discuss additional antihypertensive treatments.  Bowels are normal.    He still has no aches or pains.  PSA decreased to a value 2.7 ng/ml on 2022 but then increased to a value 10.5 ng/ml on 2022.    PSMA PET/CT performed 2022 shows numerous hypermetabolic osseous foci consistent with widespread bony metastatic disease.  These areas were not evident on conventional imaging with CT or nuclear medicine bone scan.  There was no pathologic lymphadenopathy.  No organ involvement.  No hypermetabolism within the prostate gland or seminal vesicles.      Allergies   Allergen Reactions   • Penicillins Hives       Review  of Systems   All other systems reviewed and are negative.          IPSS Questionnaire (AUA-7):  Over the past month…     1)  Incomplete Emptying  How often have you had a sensation of not emptying your bladder?  2 - Less than half the time   2)  Frequency  How often have you had to urinate less than every two hours? 4 - More than half the time   3)  Intermittency  How often have you found you stopped and started again several times when you urinated?  2 - Less than half the time   4) Urgency  How often have you found it difficult to postpone urination?  0 - Not at all   5) Weak Stream  How often have you had a weak urinary stream?  4 - More than half the time   6) Straining  How often have you had to push or strain to begin urination?  0 - Not at all   7) Nocturia  How many times did you typically get up at night to urinate?  5 - 5+ times   Total Score:  17         Quality of life due to urinary symptoms:  If you were to spend the rest of your life with your urinary condition the way it is now, how would you feel about that? 2-Mostly Satisfied   Urine Leakage (Incontinence) 0-No Leakage      Sexual Health Inventory  Current Status     1)  How do you rate your confidence that you could achieve and keep an erection? 1-Very Low   2) When you had erections with sexual stimulation, how often were your erections hard enough for penetration (entering your partner)? 4-Most times (much more than half the time)   3)  During sexual intercourse, how often were you able to maintain your erection after you had penetrated (entered) into your partner? 3-Sometimes (about half the time)   4) During sexual intercourse, how difficult was it to maintain your erection to completion of intercourse? 3-Difficult   5) When you attempted sexual intercourse, how often was it satisfactory to you? 5-Almost always or always   Total Score: 0         Bowel Health Inventory  Current Status: 0-No problems, no rectal bleeding, no discharge, less then  5 bowel movements a day                    Objective    VITAL SIGNS:   Vitals:    01/04/23 0839   BP: (!) 195/92   Pulse: 69   Resp: 20   Temp: 97.1 °F (36.2 °C)   TempSrc: Skin   SpO2: 98%   Weight: 93.6 kg (206 lb 6.4 oz)   PainSc: 0-No pain        Karnofsky score: 90       Physical Exam  Vitals and nursing note reviewed.   Constitutional:       Appearance: He is well-developed.   HENT:      Head: Normocephalic and atraumatic.   Cardiovascular:      Rate and Rhythm: Normal rate and regular rhythm.      Heart sounds: Normal heart sounds. No murmur heard.  Pulmonary:      Effort: Pulmonary effort is normal.      Breath sounds: Normal breath sounds. No wheezing or rales.   Abdominal:      General: Bowel sounds are normal. There is no distension.      Palpations: Abdomen is soft.      Tenderness: There is no abdominal tenderness.   Musculoskeletal:         General: No tenderness. Normal range of motion.      Cervical back: Normal range of motion and neck supple.   Lymphadenopathy:      Cervical: No cervical adenopathy.      Upper Body:      Right upper body: No supraclavicular adenopathy.      Left upper body: No supraclavicular adenopathy.   Skin:     General: Skin is warm and dry.   Neurological:      Mental Status: He is alert and oriented to person, place, and time.      Sensory: No sensory deficit.   Psychiatric:         Behavior: Behavior normal.         Thought Content: Thought content normal.         Judgment: Judgment normal.             The following portions of the patient's history were reviewed and updated as appropriate: allergies, current medications, past family history, past medical history, past social history, past surgical history and problem list.    Diagnoses and all orders for this visit:    Prostate cancer (HCC)  -     PSA Diagnostic; Future  -     Testosterone; Future  -     CBC & Differential; Future  -     Comprehensive Metabolic Panel; Future  -     PSA Diagnostic  -     Testosterone  -      CBC & Differential  -     Comprehensive Metabolic Panel  -     Ambulatory Referral to Oncology    Bone metastases (HCC)      IMPRESSION:  Prostate cancer, [place]'s 4+3=7, pretreatment clinical stage IIIA (T2c, N0, M0), PSA 53.4 ng/ml.  He is more than 7 months status post pelvic irradiation with moderate hypofractionated IMRT and androgen ablation.  He is clinically doing very well with no urinary tract symptoms and he has had a very good clinical response based on AQUILES.  PSA jhonny was 2.7 ng/ml but has recently increased significantly to a value of 10.5 ng/ml on 11/25/2022.    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  He has bone only metastatic disease on recent Pylarify PET/CT imaging.  The osseous metastatic deposits are asymptomatic.  PSA had tripled in 3 months in spite of continued androgen ablation indicating likely castrate resistant disease at this time.  I do not see a current role for palliative radiotherapy although he does likely still have radiation sensitive disease.  Optimal systemic treatment management will be more appropriate, reserving palliative focal radiotherapy or radionuclide therapy for the future, when needed.  He may eventually be a candidate for lutetium 177 PSMA labeled radionuclide or Xofigo radium 223     Hypertension not well controlled with recent initiation of medication.    RECOMMENDATIONS: Current PSA, testosterone level to verify castrate value, CBC and CMP drawn today.  Patient has follow-up with his urologist, Dr. Guerrero next week.  Referral to medical oncology for evaluation and assistance with systemic management.  Patient will follow-up in the near future with his primary physician, Dr. Jin, regarding management of hypertension.  I gave patient a 3-month follow-up visit with me.  I will be happy to see him sooner should he develop symptomatic bone metastases.  If he is doing very well in 3 months and PSA is coming down with tolerable  systemic treatment, I would be happy to postpone my follow-up appointment.      Remigio Haines MD     25 minutes was spent during this visit, 20 minutes directly with patient.

## 2023-01-04 NOTE — PROGRESS NOTES
RE-EVALUATION    PATIENT:                                                      Madi Blanco  :                                                          1950  DATE:                          2023   DIAGNOSIS:     Prostate cancer (HCC)  - Stage IIIA (cT2c, cN0, cM0, PSA: 53.4, Grade Group: 3)         BRIEF HISTORY:  The patient is a very pleasant 72 y.o. male  with a history of high risk prostate cancer treated with pelvic irradiation and concurrent androgen ablation.  He continues androgen suppressive hormonal therapy.  He experiences hot flashes and decreased libido.  He has no urinary outflow symptoms has been experiencing more urinary frequency and nocturia since starting diuretic blood pressure medication for hypertension blood pressure values have remained elevated and he has not yet returned to his primary physician to discuss additional antihypertensive treatments.  Bowels are normal.    He still has no aches or pains.  PSA decreased to a value 2.7 ng/ml on 2022 but then increased to a value 10.5 ng/ml on 2022.    PSMA PET/CT performed 2022 shows numerous hypermetabolic osseous foci consistent with widespread bony metastatic disease.  These areas were not evident on conventional imaging with CT or nuclear medicine bone scan.  There was no pathologic lymphadenopathy.  No organ involvement.  No hypermetabolism within the prostate gland or seminal vesicles.      Allergies   Allergen Reactions   • Penicillins Hives       Review of Systems   All other systems reviewed and are negative.           IPSS Questionnaire (AUA-7):  Over the past month…     1)  Incomplete Emptying  How often have you had a sensation of not emptying your bladder?  2 - Less than half the time   2)  Frequency  How often have you had to urinate less than every two hours? 4 - More than half the time   3)  Intermittency  How often have you found you stopped and started again several times when you urinated?  2 - Less  than half the time   4) Urgency  How often have you found it difficult to postpone urination?  0 - Not at all   5) Weak Stream  How often have you had a weak urinary stream?  4 - More than half the time   6) Straining  How often have you had to push or strain to begin urination?  0 - Not at all   7) Nocturia  How many times did you typically get up at night to urinate?  5 - 5+ times   Total Score:  17         Quality of life due to urinary symptoms:  If you were to spend the rest of your life with your urinary condition the way it is now, how would you feel about that? 2-Mostly Satisfied   Urine Leakage (Incontinence) 0-No Leakage      Sexual Health Inventory  Current Status     1)  How do you rate your confidence that you could achieve and keep an erection? 1-Very Low   2) When you had erections with sexual stimulation, how often were your erections hard enough for penetration (entering your partner)? 4-Most times (much more than half the time)   3)  During sexual intercourse, how often were you able to maintain your erection after you had penetrated (entered) into your partner? 3-Sometimes (about half the time)   4) During sexual intercourse, how difficult was it to maintain your erection to completion of intercourse? 3-Difficult   5) When you attempted sexual intercourse, how often was it satisfactory to you? 5-Almost always or always   Total Score: 0         Bowel Health Inventory  Current Status: 0-No problems, no rectal bleeding, no discharge, less then 5 bowel movements a day                    Objective   VITAL SIGNS:   Vitals:    01/04/23 0839   BP: (!) 195/92   Pulse: 69   Resp: 20   Temp: 97.1 °F (36.2 °C)   TempSrc: Skin   SpO2: 98%   Weight: 93.6 kg (206 lb 6.4 oz)   PainSc: 0-No pain        Karnofsky score: 90       Physical Exam  Vitals and nursing note reviewed.   Constitutional:       Appearance: He is well-developed.   HENT:      Head: Normocephalic and atraumatic.   Cardiovascular:      Rate and  Rhythm: Normal rate and regular rhythm.      Heart sounds: Normal heart sounds. No murmur heard.  Pulmonary:      Effort: Pulmonary effort is normal.      Breath sounds: Normal breath sounds. No wheezing or rales.   Abdominal:      General: Bowel sounds are normal. There is no distension.      Palpations: Abdomen is soft.      Tenderness: There is no abdominal tenderness.   Musculoskeletal:         General: No tenderness. Normal range of motion.      Cervical back: Normal range of motion and neck supple.   Lymphadenopathy:      Cervical: No cervical adenopathy.      Upper Body:      Right upper body: No supraclavicular adenopathy.      Left upper body: No supraclavicular adenopathy.   Skin:     General: Skin is warm and dry.   Neurological:      Mental Status: He is alert and oriented to person, place, and time.      Sensory: No sensory deficit.   Psychiatric:         Behavior: Behavior normal.         Thought Content: Thought content normal.         Judgment: Judgment normal.              The following portions of the patient's history were reviewed and updated as appropriate: allergies, current medications, past family history, past medical history, past social history, past surgical history and problem list.    Diagnoses and all orders for this visit:    Prostate cancer (HCC)  -     PSA Diagnostic; Future  -     Testosterone; Future  -     CBC & Differential; Future  -     Comprehensive Metabolic Panel; Future  -     PSA Diagnostic  -     Testosterone  -     CBC & Differential  -     Comprehensive Metabolic Panel  -     Ambulatory Referral to Oncology    Bone metastases (HCC)      IMPRESSION:  Prostate cancer, [place]'s 4+3=7, pretreatment clinical stage IIIA (T2c, N0, M0), PSA 53.4 ng/ml.  He is more than 7 months status post pelvic irradiation with moderate hypofractionated IMRT and androgen ablation.  He is clinically doing very well with no urinary tract symptoms and he has had a very good clinical response  based on AQUILES.  PSA jhonny was 2.7 ng/ml but has recently increased significantly to a value of 10.5 ng/ml on 11/25/2022.    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  He has bone only metastatic disease on recent Pylarify PET/CT imaging.  The osseous metastatic deposits are asymptomatic.  PSA had tripled in 3 months in spite of continued androgen ablation indicating likely castrate resistant disease at this time.  I do not see a current role for palliative radiotherapy although he does likely still have radiation sensitive disease.  Optimal systemic treatment management will be more appropriate, reserving palliative focal radiotherapy or radionuclide therapy for the future, when needed.  He may eventually be a candidate for lutetium 177 PSMA labeled radionuclide or Xofigo radium 223     Hypertension not well controlled with recent initiation of medication.    RECOMMENDATIONS: Current PSA, testosterone level to verify castrate value, CBC and CMP drawn today.  Patient has follow-up with his urologist, Dr. Guerrero next week.  Referral to medical oncology for evaluation and assistance with systemic management.  Patient will follow-up in the near future with his primary physician, Dr. Jin, regarding management of hypertension.  I gave patient a 3-month follow-up visit with me.  I will be happy to see him sooner should he develop symptomatic bone metastases.  If he is doing very well in 3 months and PSA is coming down with tolerable systemic treatment, I would be happy to postpone my follow-up appointment.       Remigio Haines MD     25 minutes was spent during this visit, 20 minutes directly with patient.

## 2023-01-10 ENCOUNTER — OFFICE VISIT (OUTPATIENT)
Dept: UROLOGY | Facility: CLINIC | Age: 73
End: 2023-01-10
Payer: MEDICARE

## 2023-01-10 VITALS — HEIGHT: 73 IN | WEIGHT: 206 LBS | BODY MASS INDEX: 27.3 KG/M2

## 2023-01-10 DIAGNOSIS — C79.51 PROSTATE CANCER METASTATIC TO BONE: Primary | ICD-10-CM

## 2023-01-10 DIAGNOSIS — C61 PROSTATE CANCER METASTATIC TO BONE: Primary | ICD-10-CM

## 2023-01-10 PROCEDURE — 99214 OFFICE O/P EST MOD 30 MIN: CPT | Performed by: UROLOGY

## 2023-01-10 NOTE — PROGRESS NOTES
Prostate Cancer Office Visit      Patient Name: Madi Blanco  : 1950   MRN: [id number]     Chief Complaint:  Prostate Cancer.   Chief Complaint   Patient presents with   • Prostate cancer (HCC)       Referring Provider: No ref. provider found    History of Present Illness: Madi Blanco is a 72 y.o. male who presents today for follow-up due to high risk prostate cancer.  He was diagnosed with stage IIIa, T2, N0, M0 grade group 3 prostate cancer.  Prebiopsy PSA 53.4.  He underwent combination androgen ablation and radiotherapy with Dr. Haines.. He was seen in 2022 for first 3 month PSA follow up after radiation treatment, PSA had decreased to 2.7. He then presented for 3 month follow up from that visit with PSA which resulted at 10.5.      CT abdomen/pelvis and nuclear medicine bone scan on 21 without evidence of metastatic disease.     He recently underwent PSMA scan ordered by Dr. Haines on 22 which revealed widespread bony metastatic disease.     He is planning to see Oncology tomorrow. He is currently taking Flomax 0.4mg once daily which has helped his LUTS.     Subjective      Review of System: Review of Systems   Genitourinary: Positive for decreased urine volume, frequency and urgency. Negative for difficulty urinating, dysuria, enuresis, flank pain and hematuria.      I have reviewed the ROS documented by my clinical staff, I have updated appropriately and I agree. LYNNETTE Santos    Past Medical History:   Past Medical History:   Diagnosis Date   • Benign prostatic hyperplasia 2021   • Elevated PSA 21   • Erectile dysfunction    • History of radiation therapy 2022    Prostate/pelvis   • Hypertension 22    Blood pressure started getting high after first of year   • Prostate cancer (HCC)        Past Surgical History:   Past Surgical History:   Procedure Laterality Date   • COLONOSCOPY  2016   • KNEE CARTILAGE SURGERY Left    • PROSTATE BIOPSY      • PROSTATE FIDUCIAL MARKER PLACEMENT         Family History:   Family History   Problem Relation Age of Onset   • Lymphoma Mother    • Cancer Mother         Passed away   • Emphysema Father    • Cancer Father    • Anesthesia problems Father         Had emphysema   • Stroke Brother        Social History:   Social History     Socioeconomic History   • Marital status:    Tobacco Use   • Smoking status: Former     Packs/day: 0.50     Years: 10.00     Pack years: 5.00     Types: Cigarettes     Start date: 1967     Quit date: 1977     Years since quittin.0   • Smokeless tobacco: Never   Vaping Use   • Vaping Use: Never used   Substance and Sexual Activity   • Alcohol use: Yes     Alcohol/week: 14.0 standard drinks     Types: 14 Cans of beer per week     Comment: 2 per day   • Drug use: Never   • Sexual activity: Not Currently     Partners: Female     Birth control/protection: None       Medications:     Current Outpatient Medications:   •  benazepril (LOTENSIN) 20 MG tablet, Take 1 tablet by mouth Daily., Disp: 90 tablet, Rfl: 3  •  tamsulosin (FLOMAX) 0.4 MG capsule 24 hr capsule, Take 1 capsule by mouth Every Night., Disp: 90 capsule, Rfl: 3    Allergies:   Allergies   Allergen Reactions   • Penicillins Hives       IPSS Questionnaire (AUA-7):  Over the past month…    1)  Incomplete Emptying  How often have you had a sensation of not emptying your bladder?  4 - More than half the time   2)  Frequency  How often have you had to urinate less than every two hours? 5 - Almost always   3)  Intermittency  How often have you found you stopped and started again several times when you urinated?  2 - Less than half the time   4) Urgency  How often have you found it difficult to postpone urination?  1 - Less than 1 time in 5   5) Weak Stream  How often have you had a weak urinary stream?  4 - More than half the time   6) Straining  How often have you had to push or strain to begin urination?  0 - Not at all  "  7) Nocturia  How many times did you typically get up at night to urinate?  4 - 4 times   Total Score:  20       Quality of life due to urinary symptoms:  If you were to spend the rest of your life with your urinary condition the way it is now, how would you feel about that? 3-Mixed   Urine Leakage (Incontinence) 0-No Leakage     Objective     Physical Exam:   Vital Signs:   Vitals:    01/10/23 0955   Weight: 93.4 kg (206 lb)   Height: 185.4 cm (72.99\")   PainSc: 0-No pain     Body mass index is 27.18 kg/m².     Physical Exam  Vitals and nursing note reviewed.   Constitutional:       Appearance: Normal appearance.   HENT:      Head: Normocephalic and atraumatic.      Nose: Nose normal.      Mouth/Throat:      Mouth: Mucous membranes are moist.   Eyes:      Pupils: Pupils are equal, round, and reactive to light.   Pulmonary:      Effort: Pulmonary effort is normal.   Abdominal:      General: Abdomen is flat.      Palpations: Abdomen is soft.   Musculoskeletal:         General: Normal range of motion.      Cervical back: Normal range of motion.   Skin:     General: Skin is warm and dry.      Capillary Refill: Capillary refill takes less than 2 seconds.   Neurological:      General: No focal deficit present.      Mental Status: He is alert.   Psychiatric:         Mood and Affect: Mood normal.         Labs:   Lab Results   Component Value Date    PSA 35.100 (H) 01/04/2023    PSA 10.5 (H) 11/25/2022    PSA 2.7 08/23/2022       Lab Results   Component Value Date    WBC 5.29 01/04/2023    HGB 14.6 01/04/2023    HCT 41.9 01/04/2023    MCV 92.3 01/04/2023     01/04/2023       Lab Results   Component Value Date    GLUCOSE 91 01/04/2023    BUN 20 01/04/2023    CREATININE 1.06 01/04/2023    EGFRIFNONA 70 10/22/2021    BCR 18.9 01/04/2023    K 4.8 01/04/2023    CO2 28.5 01/04/2023    CALCIUM 9.9 01/04/2023    ALBUMIN 4.3 01/04/2023    AST 19 01/04/2023    ALT 19 01/04/2023       Images:   NM PET/CT Skull Base to Mid " Thigh    Result Date: 12/31/2022  Widespread bony metastatic disease as described, much of which is not apparent on CT scan. No evidence of solid organ disease or hypermetabolic adenopathy.  This report was finalized on 12/31/2022 9:15 AM by Dr. Antonio Rosenthal MD.        Measures:   Tobacco:   Madi Blanco  reports that he quit smoking about 46 years ago. His smoking use included cigarettes. He started smoking about 56 years ago. He has a 5.00 pack-year smoking history. He has never used smokeless tobacco..       Urine Incontinence: ( NOUI)    Assessment / Plan      Assessment:   Mr. Madi Blanco is a 72 y.o. male who presented today with metastatic prostate cancer.   He was diagnosed with stage IIIa, T2, N0, M0 grade group 3 prostate cancer.  Prebiopsy PSA 53.4.  He underwent combination androgen ablation and radiotherapy with Dr. Haines.. He was seen in 8/2022 for first 3 month PSA follow up after radiation treatment, PSA had decreased to 2.7. He then presented for 3 month follow up from that visit with PSA which resulted at 10.5.     Diagnoses and all orders for this visit:    1. Prostate cancer metastatic to bone (HCC) (Primary)      Patient Education:   I counseled the patient extensively on the nature of his cancer and the treatment options. He is planning to see Oncology tomorrow for further treatment options of his metastatic prostate cancer. No urologic surgical intervention recommended at this time. We will plan for 6 month Urologic follow up. He is understanding and agreeable with this plan.     Follow Up:   Return in 6 months (on 7/10/2023) for 6 months , Recheck.    I spent approximately 25 minutes providing clinical care for this patient; including review of patient's chart and provider documentation, face to face time spent with patient in examination room (obtaining history, performing physical exam, discussing diagnosis and management options), placing orders, and completing patient documentation.      Catarino Guerrero MD  [facility] [place]

## 2023-01-11 ENCOUNTER — CONSULT (OUTPATIENT)
Dept: ONCOLOGY | Facility: CLINIC | Age: 73
End: 2023-01-11
Payer: MEDICARE

## 2023-01-11 VITALS
OXYGEN SATURATION: 97 % | SYSTOLIC BLOOD PRESSURE: 191 MMHG | HEART RATE: 63 BPM | TEMPERATURE: 97.2 F | WEIGHT: 205 LBS | HEIGHT: 73 IN | DIASTOLIC BLOOD PRESSURE: 78 MMHG | RESPIRATION RATE: 18 BRPM | BODY MASS INDEX: 27.17 KG/M2

## 2023-01-11 DIAGNOSIS — C79.51 BONE METASTASES: Primary | ICD-10-CM

## 2023-01-11 DIAGNOSIS — C61 PROSTATE CANCER: ICD-10-CM

## 2023-01-11 PROCEDURE — 99205 OFFICE O/P NEW HI 60 MIN: CPT | Performed by: INTERNAL MEDICINE

## 2023-01-11 RX ORDER — SODIUM CHLORIDE 9 MG/ML
250 INJECTION, SOLUTION INTRAVENOUS ONCE
Status: CANCELLED | OUTPATIENT
Start: 2023-02-22

## 2023-01-11 NOTE — LETTER
January 11, 2023     Remigio Haines MD  [address] [facility] [address]    Patient: Madi Blanco   YOB: 1950   Date of Visit: 1/11/2023       Dear Dr. Zaki MD:    Thank you for referring Madi Blanco to me for evaluation. Below are the relevant portions of my assessment and plan of care.    If you have questions, please do not hesitate to call me. I look forward to following Madi along with you.         Sincerely,        Deja Roa MD        CC: MD Catarino Saldivar MD Padmanabhan, Arvinda, MD  01/11/23 0894  Signed  ID: 72 y.o. year old male from [facility] [id number]    PCP: Bharathi Bryan MD    REFERRING PHYSICIAN: Remigio Haines MD    Reason for Consultation: Metastatic adenocarcinoma of the prostate    Dear Dr. Haines, Dr. Bryan and Dr. Guerrero    It is a pleasure to meet Mr. Blanco today.  He is a very pleasant 72-year-old gentleman who presents today for consultation for stage IV adenocarcinoma of the prostate.  He was initially thought to have localized disease and was treated with IMRT and then was placed on hormone blockade with Lupron.  However his PSA initially declined and subsequently started rising again.  CAT scans failed to reveal any obvious disease.  So a Axumin PET scan was performed.  This showed extensive bone metastases that was not visualized on CT scans.  Patient is fairly asymptomatic.  Though he has some of the side effects of Lupron which he received in August 2022.  He received a 6-month dose.  He presents today to discuss options going forward.  He does not have a lot of significant chronic medical issues.  He denies any pain.  No unexplained weight loss.  He does report hot flashes from the Lupron.          Past Medical History:   Diagnosis Date   • Benign prostatic hyperplasia 11/21/2021   • Elevated PSA 11-20-21   • Erectile dysfunction 04/22   • History of radiation therapy 04/18/2022     Prostate/pelvis   • Hypertension 22    Blood pressure started getting high after first of year   • Prostate cancer (HCC)        Past Surgical History:   Procedure Laterality Date   • COLONOSCOPY  2016   • KNEE CARTILAGE SURGERY Left    • PROSTATE BIOPSY     • PROSTATE FIDUCIAL MARKER PLACEMENT         Social History     Socioeconomic History   • Marital status:    Tobacco Use   • Smoking status: Former     Packs/day: 0.50     Years: 10.00     Pack years: 5.00     Types: Cigarettes     Start date: 1967     Quit date: 1977     Years since quittin.0   • Smokeless tobacco: Never   Vaping Use   • Vaping Use: Never used   Substance and Sexual Activity   • Alcohol use: Yes     Alcohol/week: 14.0 standard drinks     Types: 14 Cans of beer per week     Comment: 2 per day   • Drug use: Never   • Sexual activity: Not Currently     Partners: Female     Birth control/protection: None       Family History   Problem Relation Age of Onset   • Lymphoma Mother    • Cancer Mother         Passed away   • Emphysema Father    • Cancer Father    • Anesthesia problems Father         Had emphysema   • Stroke Brother        Review of Systems:    16 point review of systems was performed and reviewed and scanned into the EMR    Review of Systems   Constitutional: Positive for fatigue.   HENT:  Negative.    Eyes: Negative.    Respiratory: Negative.    Cardiovascular: Negative.    Gastrointestinal: Negative.    Endocrine: Positive for hot flashes.   Genitourinary: Negative.     Musculoskeletal: Negative.    Skin: Negative.    Neurological: Negative.    Hematological: Negative.    Psychiatric/Behavioral: Negative.          Current Outpatient Medications:   •  benazepril (LOTENSIN) 20 MG tablet, Take 1 tablet by mouth Daily., Disp: 90 tablet, Rfl: 3  •  tamsulosin (FLOMAX) 0.4 MG capsule 24 hr capsule, Take 1 capsule by mouth Every Night., Disp: 90 capsule, Rfl: 3         Allergies   Allergen Reactions   •  Penicillins Hives         ECOG score: 0           Objective      Vitals:    01/11/23 1250   BP: (!) 191/78   Pulse: 63   Resp: 18   Temp: 97.2 °F (36.2 °C)   SpO2: 97%     Body mass index is 27.05 kg/m².  Body surface area is 2.17 meters squared.        01/11/23  1250   Weight: 93 kg (205 lb)     Pain Score    01/11/23 1250   PainSc: 0-No pain          Physical Exam    General: well appearing, in no acute distress  HEENT: sclera anicteric, oropharynx clear, neck is supple  Lymphatics: no cervical, supraclavicular, or axillary adenopathy  Cardiovascular: regular rate and rhythm, no murmurs, rubs or gallops  Lungs: clear to auscultation bilaterally  Abdomen: soft, nontender, nondistended.  No palpable organomegaly  Extremities: no lower extremity edema  Skin: no rashes, lesions, bruising, or petechiae  Msk:  Shows no weakness of the large muscle groups  Psych: Mood is stable        Lab Results   Component Value Date    GLUCOSE 91 01/04/2023    BUN 20 01/04/2023    CREATININE 1.06 01/04/2023     01/04/2023    K 4.8 01/04/2023     01/04/2023    CO2 28.5 01/04/2023    CALCIUM 9.9 01/04/2023    PROTEINTOT 7.1 01/04/2023    ALBUMIN 4.3 01/04/2023    BILITOT 0.3 01/04/2023    ALKPHOS 118 (H) 01/04/2023    AST 19 01/04/2023    ALT 19 01/04/2023       Lab Results   Component Value Date    HGB 14.6 01/04/2023    HCT 41.9 01/04/2023    MCV 92.3 01/04/2023     01/04/2023    WBC 5.29 01/04/2023    NEUTROABS 3.64 01/04/2023    LYMPHSABS 0.85 01/04/2023    MONOSABS 0.43 01/04/2023    EOSABS 0.25 01/04/2023    BASOSABS 0.08 01/04/2023     Lab Results   Component Value Date    PSA 35.100 (H) 01/04/2023    PSA 10.5 (H) 11/25/2022    PSA 2.7 08/23/2022    PSA 53.4 (H) 11/11/2021    PSA 48.400 (H) 10/22/2021           NM PET/CT Skull Base to Mid Thigh    Result Date: 12/31/2022  Widespread bony metastatic disease as described, much of which is not apparent on CT scan. No evidence of solid organ disease or  hypermetabolic adenopathy.  This report was finalized on 12/31/2022 9:15 AM by Dr. Antonio Rosenthal MD.          Assessment       1.  Stage IV adenocarcinoma of the prostate.  I reviewed his scans with him and his wife today.  He has extensive bone metastases though not visualized on the CAT scan.  Its not a good sign for him to progress in spite of Lupron this early into treatment.  Hopefully he does not have refractory disease at this point.  I think choices at this time is to consider chemotherapy versus adding apalutamide.  I think it is reasonable since he is asymptomatic to treat him with Lupron and apalutamide to see if he would respond.  I discussed expected side effects of the drug itself.  I also discussed prognosis which in this situation is not curable though manageable.  I have also recommended addition of Zometa to reduce his risk of fractures.  I am going to change his dosing of Lupron to every 3 months to coincide with Zometa and add apalutamide for now.  I answered all the questions that he had for me today and we will see him back in my clinic in 6 weeks to make sure he is tolerating everything well.    Total time of patient care on day of service including time prior to, face to face with patient, and following visit spent in reviewing records, lab results, imaging studies, discussion with patient, and documentation/charting was > 70 minutes.             Thank you for allowing me to participate in the care of this patient.    Yours sincerely,    Deja Roa MD  [facility]  Hematology and Oncology    Return on: 02/22/23  Return in (Approximately): 6 weeks    Orders Placed This Encounter   Procedures   • PSA     Standing Status:   Future     Standing Expiration Date:   2/22/2024     Order Specific Question:   Release to patient     Answer:   Routine Release   • PSA     Standing Status:   Future     Standing Expiration Date:   5/16/2024     Order Specific Question:   Release to  patient     Answer:   Routine Release   • Comprehensive metabolic panel     Standing Status:   Future     Standing Expiration Date:   2/22/2024     Order Specific Question:   Release to patient     Answer:   Routine Release   • Magnesium     Standing Status:   Future     Standing Expiration Date:   2/22/2024     Order Specific Question:   Release to patient     Answer:   Routine Release   • Phosphorus     Standing Status:   Future     Standing Expiration Date:   2/22/2024     Order Specific Question:   Release to patient     Answer:   Routine Release   • CBC and Differential     Standing Status:   Future     Standing Expiration Date:   2/22/2024     Order Specific Question:   Manual Differential     Answer:   No     Order Specific Question:   Release to patient     Answer:   Routine Release

## 2023-01-11 NOTE — PROGRESS NOTES
ID: 72 y.o. year old male from [facility] [id number]    PCP: Bharathi Bryan MD    REFERRING PHYSICIAN: Remigio Haines MD    Reason for Consultation: Metastatic adenocarcinoma of the prostate    Dear Dr. Haines, Dr. Bryan and Dr. Guerrero    It is a pleasure to meet Mr. Blanco today.  He is a very pleasant 72-year-old gentleman who presents today for consultation for stage IV adenocarcinoma of the prostate.  He was initially thought to have localized disease and was treated with IMRT and then was placed on hormone blockade with Lupron.  However his PSA initially declined and subsequently started rising again.  CAT scans failed to reveal any obvious disease.  So a Axumin PET scan was performed.  This showed extensive bone metastases that was not visualized on CT scans.  Patient is fairly asymptomatic.  Though he has some of the side effects of Lupron which he received in 2022.  He received a 6-month dose.  He presents today to discuss options going forward.  He does not have a lot of significant chronic medical issues.  He denies any pain.  No unexplained weight loss.  He does report hot flashes from the Lupron.          Past Medical History:   Diagnosis Date   • Benign prostatic hyperplasia 2021   • Elevated PSA 21   • Erectile dysfunction    • History of radiation therapy 2022    Prostate/pelvis   • Hypertension 22    Blood pressure started getting high after first of year   • Prostate cancer (HCC)        Past Surgical History:   Procedure Laterality Date   • COLONOSCOPY  2016   • KNEE CARTILAGE SURGERY Left    • PROSTATE BIOPSY     • PROSTATE FIDUCIAL MARKER PLACEMENT         Social History     Socioeconomic History   • Marital status:    Tobacco Use   • Smoking status: Former     Packs/day: 0.50     Years: 10.00     Pack years: 5.00     Types: Cigarettes     Start date: 1967     Quit date: 1977     Years since quittin.0   • Smokeless  tobacco: Never   Vaping Use   • Vaping Use: Never used   Substance and Sexual Activity   • Alcohol use: Yes     Alcohol/week: 14.0 standard drinks     Types: 14 Cans of beer per week     Comment: 2 per day   • Drug use: Never   • Sexual activity: Not Currently     Partners: Female     Birth control/protection: None       Family History   Problem Relation Age of Onset   • Lymphoma Mother    • Cancer Mother         Passed away   • Emphysema Father    • Cancer Father    • Anesthesia problems Father         Had emphysema   • Stroke Brother        Review of Systems:    16 point review of systems was performed and reviewed and scanned into the EMR    Review of Systems   Constitutional: Positive for fatigue.   HENT:  Negative.    Eyes: Negative.    Respiratory: Negative.    Cardiovascular: Negative.    Gastrointestinal: Negative.    Endocrine: Positive for hot flashes.   Genitourinary: Negative.     Musculoskeletal: Negative.    Skin: Negative.    Neurological: Negative.    Hematological: Negative.    Psychiatric/Behavioral: Negative.          Current Outpatient Medications:   •  benazepril (LOTENSIN) 20 MG tablet, Take 1 tablet by mouth Daily., Disp: 90 tablet, Rfl: 3  •  tamsulosin (FLOMAX) 0.4 MG capsule 24 hr capsule, Take 1 capsule by mouth Every Night., Disp: 90 capsule, Rfl: 3         Allergies   Allergen Reactions   • Penicillins Hives         ECOG score: 0           Objective     Vitals:    01/11/23 1250   BP: (!) 191/78   Pulse: 63   Resp: 18   Temp: 97.2 °F (36.2 °C)   SpO2: 97%     Body mass index is 27.05 kg/m².  Body surface area is 2.17 meters squared.        01/11/23  1250   Weight: 93 kg (205 lb)     Pain Score    01/11/23 1250   PainSc: 0-No pain          Physical Exam    General: well appearing, in no acute distress  HEENT: sclera anicteric, oropharynx clear, neck is supple  Lymphatics: no cervical, supraclavicular, or axillary adenopathy  Cardiovascular: regular rate and rhythm, no murmurs, rubs or  gallops  Lungs: clear to auscultation bilaterally  Abdomen: soft, nontender, nondistended.  No palpable organomegaly  Extremities: no lower extremity edema  Skin: no rashes, lesions, bruising, or petechiae  Msk:  Shows no weakness of the large muscle groups  Psych: Mood is stable        Lab Results   Component Value Date    GLUCOSE 91 01/04/2023    BUN 20 01/04/2023    CREATININE 1.06 01/04/2023     01/04/2023    K 4.8 01/04/2023     01/04/2023    CO2 28.5 01/04/2023    CALCIUM 9.9 01/04/2023    PROTEINTOT 7.1 01/04/2023    ALBUMIN 4.3 01/04/2023    BILITOT 0.3 01/04/2023    ALKPHOS 118 (H) 01/04/2023    AST 19 01/04/2023    ALT 19 01/04/2023       Lab Results   Component Value Date    HGB 14.6 01/04/2023    HCT 41.9 01/04/2023    MCV 92.3 01/04/2023     01/04/2023    WBC 5.29 01/04/2023    NEUTROABS 3.64 01/04/2023    LYMPHSABS 0.85 01/04/2023    MONOSABS 0.43 01/04/2023    EOSABS 0.25 01/04/2023    BASOSABS 0.08 01/04/2023     Lab Results   Component Value Date    PSA 35.100 (H) 01/04/2023    PSA 10.5 (H) 11/25/2022    PSA 2.7 08/23/2022    PSA 53.4 (H) 11/11/2021    PSA 48.400 (H) 10/22/2021           NM PET/CT Skull Base to Mid Thigh    Result Date: 12/31/2022  Widespread bony metastatic disease as described, much of which is not apparent on CT scan. No evidence of solid organ disease or hypermetabolic adenopathy.  This report was finalized on 12/31/2022 9:15 AM by Dr. Antonio Rosenthal MD.          Assessment      1.  Stage IV adenocarcinoma of the prostate.  I reviewed his scans with him and his wife today.  He has extensive bone metastases though not visualized on the CAT scan.  Its not a good sign for him to progress in spite of Lupron this early into treatment.  Hopefully he does not have refractory disease at this point.  I think choices at this time is to consider chemotherapy versus adding apalutamide.  I think it is reasonable since he is asymptomatic to treat him with Lupron and apalutamide  to see if he would respond.  I discussed expected side effects of the drug itself.  I also discussed prognosis which in this situation is not curable though manageable.  I have also recommended addition of Zometa to reduce his risk of fractures.  I am going to change his dosing of Lupron to every 3 months to coincide with Zometa and add apalutamide for now.  I answered all the questions that he had for me today and we will see him back in my clinic in 6 weeks to make sure he is tolerating everything well.    Total time of patient care on day of service including time prior to, face to face with patient, and following visit spent in reviewing records, lab results, imaging studies, discussion with patient, and documentation/charting was > 70 minutes.              Thank you for allowing me to participate in the care of this patient.    Yours sincerely,    Deja Roa MD  [facility]  Hematology and Oncology    Return on: 02/22/23  Return in (Approximately): 6 weeks    Orders Placed This Encounter   Procedures   • PSA     Standing Status:   Future     Standing Expiration Date:   2/22/2024     Order Specific Question:   Release to patient     Answer:   Routine Release   • PSA     Standing Status:   Future     Standing Expiration Date:   5/16/2024     Order Specific Question:   Release to patient     Answer:   Routine Release   • Comprehensive metabolic panel     Standing Status:   Future     Standing Expiration Date:   2/22/2024     Order Specific Question:   Release to patient     Answer:   Routine Release   • Magnesium     Standing Status:   Future     Standing Expiration Date:   2/22/2024     Order Specific Question:   Release to patient     Answer:   Routine Release   • Phosphorus     Standing Status:   Future     Standing Expiration Date:   2/22/2024     Order Specific Question:   Release to patient     Answer:   Routine Release   • CBC and Differential     Standing Status:   Future      Standing Expiration Date:   2/22/2024     Order Specific Question:   Manual Differential     Answer:   No     Order Specific Question:   Release to patient     Answer:   Routine Release

## 2023-01-12 ENCOUNTER — SPECIALTY PHARMACY (OUTPATIENT)
Dept: ONCOLOGY | Facility: HOSPITAL | Age: 73
End: 2023-01-12
Payer: MEDICARE

## 2023-01-12 DIAGNOSIS — C61 PROSTATE CANCER: ICD-10-CM

## 2023-01-12 DIAGNOSIS — C79.51 BONE METASTASES: Primary | ICD-10-CM

## 2023-01-12 NOTE — PROGRESS NOTES
Oral Chemotherapy - New Referral    Received a referral from Dr. Roa    Treatment Plan: Erleada (apalutamide), leuprolide  Start date of treatment planned for: As soon as oral specialty medication is available.  Indication: metastatic castration sensitive prostate cancer  Relevant past treatments: radiation, 1 dose of leuprolide (Q6 month dose), now with symptomatic bone disease and rising PSA  Is the therapy appropriate based on treatment guidelines and FDA labeling?: yes  Therapeutic Goals: Continue treatment until progression or intolerable toxicity  Patient can self-administer oral medications: Yes    • Drug-Drug Interactions: The current medication list was reviewed and there are no relevant drug-drug interactions.  • Medication Allergies: The patient has no relevant allergies as it relates to their oral specialty medication  • Review of Labs/Dose Adjustments: The patient's most recent labs were reviewed and all are WNL to start treatment at this dose.    • Bone Disease: Zometa IV Q3 months, will recommend starting calcium and vitamin D during education    A prescription was released to [facility] specialty pharmacy for   Drug: Erleada (apalutamide)  Strength: 60 mg  Directions: Take 4 tablets by mouth daily  Quantity: 120  Refills: 11    Pharmacy education is scheduled for 1/20/23 at 1 pm. and CCA and consent will be signed at that time.    Halima Quintana, PharmD, [facility]  Oncology Clinical Pharmacist  1/12/2023  15:31 EST

## 2023-01-13 ENCOUNTER — OFFICE VISIT (OUTPATIENT)
Dept: FAMILY MEDICINE CLINIC | Facility: CLINIC | Age: 73
End: 2023-01-13
Payer: MEDICARE

## 2023-01-13 VITALS
TEMPERATURE: 96.5 F | RESPIRATION RATE: 16 BRPM | HEIGHT: 73 IN | BODY MASS INDEX: 27.3 KG/M2 | WEIGHT: 206 LBS | HEART RATE: 69 BPM | SYSTOLIC BLOOD PRESSURE: 156 MMHG | OXYGEN SATURATION: 98 % | DIASTOLIC BLOOD PRESSURE: 96 MMHG

## 2023-01-13 DIAGNOSIS — I10 PRIMARY HYPERTENSION: Primary | ICD-10-CM

## 2023-01-13 DIAGNOSIS — Z23 FLU VACCINE NEED: ICD-10-CM

## 2023-01-13 PROCEDURE — 99213 OFFICE O/P EST LOW 20 MIN: CPT | Performed by: FAMILY MEDICINE

## 2023-01-13 PROCEDURE — 90662 IIV NO PRSV INCREASED AG IM: CPT | Performed by: FAMILY MEDICINE

## 2023-01-13 PROCEDURE — G0008 ADMIN INFLUENZA VIRUS VAC: HCPCS | Performed by: FAMILY MEDICINE

## 2023-01-13 NOTE — PROGRESS NOTES
Subjective   Madi Blanco is a 72 y.o. male    Hypertension    History of Present Illness    The patient's blood pressure has been elevated of late. We increased his benazepril from 10 to 20 mg daily, but his blood pressure remains elevated today at 156/96 mmHg.    The patient reports his blood pressure has been stabilized at 130 mmHg on Tuesday, 01/10/2023. He states his blood pressure has improved. He denies aches and pains. He states he is doing well. He denies swelling. He states he has some arthritis. He states been approximately a week since his medication has been increased. He inquires about an influenza vaccination today. He notes he has 80 pills of the blood pressure medication. He reports he comes back for a physical examination on 03/23/2023. He inquires about vitamin recommendations.     The following portions of the patient's history were reviewed and updated as appropriate: allergies, current medications, past social history and problem list    Review of Systems   Constitutional: Negative for fatigue and unexpected weight change.   Respiratory: Negative for cough, chest tightness and shortness of breath.    Cardiovascular: Negative for chest pain, palpitations and leg swelling.   Gastrointestinal: Negative for nausea.   Musculoskeletal: Negative for neck pain.   Skin: Negative for color change and rash.   Neurological: Negative for dizziness, syncope, weakness and headaches.       Objective     Vitals:    01/13/23 1513   BP: 156/96   Pulse: 69   Resp: 16   Temp: 96.5 °F (35.8 °C)   SpO2: 98%       Physical Exam  Vitals and nursing note reviewed.   Constitutional:       General: He is not in acute distress.     Appearance: Normal appearance. He is well-developed. He is not ill-appearing, toxic-appearing or diaphoretic.   Neck:      Vascular: No carotid bruit or JVD.   Cardiovascular:      Rate and Rhythm: Normal rate and regular rhythm.      Pulses: Normal pulses.      Heart sounds: Normal heart  sounds. No murmur heard.  Pulmonary:      Effort: Pulmonary effort is normal. No respiratory distress.      Breath sounds: Normal breath sounds.   Abdominal:      Palpations: Abdomen is soft.      Tenderness: There is no abdominal tenderness.   Skin:     General: Skin is warm and dry.   Neurological:      Mental Status: He is alert.         Assessment & Plan     Problems Addressed this Visit    None  Visit Diagnoses     Primary hypertension    -  Primary    Flu vaccine need          Diagnoses       Codes Comments    Primary hypertension    -  Primary ICD-10-CM: I10  ICD-9-CM: 401.9     Flu vaccine need     ICD-10-CM: Z23  ICD-9-CM: V04.81         I spent 15 minutes in patient care: Reviewing records prior to the visit, examining the patient, entering orders and documentation    Part of this note may be an electronic transcription/translation of spoken language to printed text using the Dragon Dictation System.           Transcribed from ambient dictation for CLAIRE Bryan MD by Abner Barros.  01/13/23   16:00 EST  Patient or patient representative verbalized consent to the visit recording.  I have personally performed the services described in this document as transcribed by the above individual, and it is both accurate and complete.

## 2023-01-13 NOTE — PROGRESS NOTES
Oral Chemotherapy - Double Check    Drug: apalutamide  Strength: 60mg tablet  Directions: Take 4 tablets PO daily  QTY: 120  RF:11    Released to pharmacy: [facility]    Completed independent double check on medication order/RX.    1/13/2023  07:29 EST

## 2023-01-20 ENCOUNTER — HOSPITAL ENCOUNTER (OUTPATIENT)
Dept: ONCOLOGY | Facility: HOSPITAL | Age: 73
Discharge: HOME | End: 2023-01-20
Payer: MEDICARE

## 2023-01-20 ENCOUNTER — SPECIALTY PHARMACY (OUTPATIENT)
Dept: ONCOLOGY | Facility: HOSPITAL | Age: 73
End: 2023-01-20
Payer: MEDICARE

## 2023-01-20 ENCOUNTER — OFFICE VISIT (OUTPATIENT)
Dept: ONCOLOGY | Facility: CLINIC | Age: 73
End: 2023-01-20
Payer: MEDICARE

## 2023-01-20 VITALS
OXYGEN SATURATION: 97 % | HEART RATE: 89 BPM | RESPIRATION RATE: 18 BRPM | BODY MASS INDEX: 27.43 KG/M2 | HEIGHT: 73 IN | TEMPERATURE: 98.6 F | WEIGHT: 207 LBS | DIASTOLIC BLOOD PRESSURE: 79 MMHG | SYSTOLIC BLOOD PRESSURE: 185 MMHG

## 2023-01-20 DIAGNOSIS — C79.51 BONE METASTASES: ICD-10-CM

## 2023-01-20 DIAGNOSIS — C61 PROSTATE CANCER: Primary | ICD-10-CM

## 2023-01-20 PROCEDURE — 99213 OFFICE O/P EST LOW 20 MIN: CPT | Performed by: NURSE PRACTITIONER

## 2023-01-20 RX ORDER — BENAZEPRIL HYDROCHLORIDE 40 MG/1
40 TABLET, FILM COATED ORAL EVERY MORNING
COMMUNITY
End: 2023-01-30

## 2023-01-20 RX ORDER — ONDANSETRON HYDROCHLORIDE 8 MG/1
8 TABLET, FILM COATED ORAL 3 TIMES DAILY PRN
Qty: 30 TABLET | Refills: 5 | Status: SHIPPED | OUTPATIENT
Start: 2023-01-20 | End: 2023-03-02 | Stop reason: SDUPTHER

## 2023-01-20 RX ORDER — ONDANSETRON HYDROCHLORIDE 8 MG/1
8 TABLET, FILM COATED ORAL 3 TIMES DAILY PRN
Qty: 30 TABLET | Refills: 5 | Status: CANCELLED | OUTPATIENT
Start: 2023-01-20

## 2023-01-20 NOTE — PROGRESS NOTES
Specialty Pharmacy Patient Management Program  Oncology Initial Assessment       Madi Blanco is a 72 y.o. male with metastatic castration sensitive prostate cancer seen by an Oncology provider and enrolled in the Oncology Patient Management program offered by [facility].  An initial outreach was conducted, including assessment of therapy appropriateness and specialty medication education for Erleada (apalutamide), leuprolide, zoledronic acid. The patient was introduced to services offered by [facility], including: regular assessments, refill coordination, curbside pick-up or mail order delivery options, prior authorization maintenance, and financial assistance programs as applicable. The patient was also provided with contact information for the pharmacy team.     Regimen: Erleada (apalutamide) 60 mg tablets - take 4 tablets (240 mg) PO daily    Will also start leuprolide IM injections every 3 months along with zoledronic acid IV infusions every 3 months.    Start date of oral specialty medication: 1/21/23    Relevant Past Medical History, Comorbidities, and Vaccines  Relevant medical history and concomitant health conditions were discussed with the patient. The patient's chart has been reviewed for relevant past medical history and comorbid health conditions and updated as necessary.  Vaccines are coordinated by the patient's oncologist and primary care provider.  Past Medical History:   Diagnosis Date   • Benign prostatic hyperplasia 11/21/2021   • Elevated PSA 11-20-21   • Erectile dysfunction 04/22   • History of radiation therapy 04/18/2022    Prostate/pelvis   • Hypertension 12/2/22    Blood pressure started getting high after first of year   • Prostate cancer (HCC)      Social History     Socioeconomic History   • Marital status:    Tobacco Use   • Smoking status: Former     Packs/day: 0.50     Years: 10.00     Pack years: 5.00     Types: Cigarettes      Start date: 1967     Quit date: 1977     Years since quittin.0   • Smokeless tobacco: Never   Vaping Use   • Vaping Use: Never used   Substance and Sexual Activity   • Alcohol use: Yes     Alcohol/week: 14.0 standard drinks     Types: 14 Cans of beer per week     Comment: 2 per day   • Drug use: Never   • Sexual activity: Not Currently     Partners: Female     Birth control/protection: None       Allergies  Known allergies and reactions were discussed with the patient. The patient's chart has been reviewed for allergy information and updated as necessary.   Penicillins    Current Medication List  This medication list has been reviewed with the patient and evaluated for any interactions or necessary modifications/recommendations, and updated to include all prescription medications, OTC medications, and supplements the patient is currently taking.  This list reflects what is contained in the patient's profile, which has also been marked as reviewed to communicate to other providers it is the most up to date version of the patient's current medication therapy.   Prior to Admission medications    Medication Sig Start Date End Date Taking? Authorizing Provider   benazepril (LOTENSIN) 20 MG tablet Take 1 tablet by mouth twice daily. 22   Bharathi Bryan MD   ondansetron (ZOFRAN) 8 MG tablet Take 1 tablet by mouth 3 (Three) Times a Day As Needed for Nausea or Vomiting. 23   Deja Roa MD   tamsulosin (FLOMAX) 0.4 MG capsule 24 hr capsule Take 1 capsule by mouth Every Night. 22   Bharathi Bryan MD       Drug Interactions  • Reviewed concomitant medications, allergies, labs, comorbidities/medical history, quality of life, and immunization history.   • Drug-drug interactions noted and discussed during education: no significant drug interactions noted. . Reminded the patient to let us know before making any changes or starting any new prescription or OTC  medications so we can first assess drug interactions.  • Drug-food interactions: None    Recommended Medications Assessment  • Bone Health (such as calcium/vitamin D, bisphosphonate, RANKL inhibitor) - Indicated, not currently taking The patient will start taking Calcium and Vitamin D, Recommended they purchase over the counter and start taking calcium 1000 mg PO daily, vitamin D 800-1000 international units PO daily and Zometa (zoledronic acid) every 3 months  • VTE prophylaxis - Not Indicated   • Prophylactic antimicrobials - Not Indicated     Relevant Laboratory Values  Lab Results   Component Value Date    GLUCOSE 91 01/04/2023    CALCIUM 9.9 01/04/2023     01/04/2023    K 4.8 01/04/2023    CO2 28.5 01/04/2023     01/04/2023    BUN 20 01/04/2023    CREATININE 1.06 01/04/2023    EGFRIFNONA 70 10/22/2021    BCR 18.9 01/04/2023    ANIONGAP 9.5 01/04/2023     Lab Results   Component Value Date    WBC 5.29 01/04/2023    RBC 4.54 01/04/2023    HGB 14.6 01/04/2023    HCT 41.9 01/04/2023    MCV 92.3 01/04/2023    MCH 32.2 01/04/2023    MCHC 34.8 01/04/2023    RDW 12.8 01/04/2023    RDWSD 43.8 01/04/2023    MPV 10.1 01/04/2023     01/04/2023    NEUTRORELPCT 68.8 01/04/2023    LYMPHORELPCT 16.1 (L) 01/04/2023    MONORELPCT 8.1 01/04/2023    EOSRELPCT 4.7 01/04/2023    BASORELPCT 1.5 01/04/2023    AUTOIGPER 0.8 (H) 01/04/2023    NEUTROABS 3.64 01/04/2023    LYMPHSABS 0.85 01/04/2023    MONOSABS 0.43 01/04/2023    EOSABS 0.25 01/04/2023    BASOSABS 0.08 01/04/2023    AUTOIGNUM 0.04 01/04/2023    NRBC 0.0 01/04/2023       Initial Education Provided for Specialty Medication  The patient has been provided with the following education. All questions and concerns have been addressed prior to the patient receiving the medication, and the patient has verbalized understanding of the education and any materials provided.  Additional patient education shall be provided and documented upon request by the patient,  provider or payer.      Provided patient with:   Chemo calendar to help improve adherence., Education sheets about the medication, 24-hour clinic phone number and my contact information and instructions to call should additional questions arise.     Medication Education Sheets Provided: (select all that apply)  • Oral Specialty Medication: Erleada (apalutamide)  • IM and IV: Leuprolide and Zoledronic Acid    Other Education Sheets Provided: (select all that apply)  Adherence, CINV, Constipation, Diarrhea and Symptom Tracker Sheet and FLORINA Information    TOPICS COMMENTS   Storage and Handling of Oral Specialty Medication Store in the original container, in a dry location out of direct sunlight, and out of reach of children or pets. and Store at room temperature.  Discussed safe handling and what to do with any unused medication.   Administration of Oral Specialty Medication Take with or without food at the same time(s) each day., Take with a full glass of water. and Do not crush or chew tablets.   Adherence to Oral Specialty Regimen and Handling Missed Doses Patient is likely to have good treatment adherence; reinforced the importance of adherence. Reviewed how to address missed doses and to let us know of any missed doses.   Anemia: role of RBC, cause, s/s, ways to manage, role of transfusion Reviewed the role of RBC and the use of transfusions if hemoglobin decreases too much.  Patient to notify us if they experience shortness of breath, dizziness, or palpitations.  Also let patient know they could feel more tired than usual and to try to stay active, but rest if they need to.    Neutropenia: role of WBC, cause, infection precautions, s/s of infection, when to call MD Reviewed the role of WBC, good infection prevention practices, and when to call the clinic (temperature 100.4F, sore throat, burning urination, etc).    Discussed the possibility of flu-like symptoms after the first few doses of zoledronic acid.  If  they occur, it's typically mild and self-limited.  Instructed to contact clinic if severe or long lasting.    • COVID Vaccines: 1 dose received   • Flu Vaccine: 2022 flu vaccine received   Nutrition and Appetite Changes:  importance of maintaining healthy diet & weight, ways to manage to improve intake, dietary consult, exercise regimen, electrolyte and/or blood glucose abnormalities Electrolyte Abnormalities:  Explained the Erleada may lead to abnormalities in electrolytes, specifically: high potassium and glucose. Zoledronic acid can lower potassium and phosphorus.    Lipid Panel Abnormalities:  Explained the Erleada may lead to abnormalities in lipid values, specifically: high cholesterol and triglycerides   Diarrhea: causes, s/s of dehydration, ways to manage, dietary changes, when to call MD Zoledronic acid - can cause diarrhea. Instructed patient that they can use OTC loperamide at first presentation of diarrhea, but call MD if 4-6 episodes in 24 hours not relieved by OTC loperamide.   Constipation: causes, ways to manage, dietary changes, when to call MD Zoledronic acid - can cause constipation. Provided supplementary handout with instructions for use of docusate and other OTC therapies to manage constipation.  Instructed to call us if medications aren't working.   Nausea/Vomiting: cause, use of antiemetics, dietary changes, when to call MD • Emetic risk: Minimal  • Premeds: None  • Scheduled meds: None  • PRN home meds: Ondansetron 8 mg PO TID PRN N/V  • Pharmacy home meds sent to: [facility] in [place], [place]    Instructed the patient to take a dose of the PRN medication at the first onset of nausea and if it's not working to call us for additional medications.  Also provided non-drug measures to mitigate nausea.   Nervous System Changes: causes, s/s, neuropathies, cognitive changes, ways to manage discussed the rare, but serious risk of PRES and the signs/symptoms, discussed the rare, but serious risk of seizures  and the signs/symptoms and discussed the possibility of hot flashes as well as mitigation strategies   Pain: causes, ways to manage Zoledronic acid - bone pain can occur, it is typically mild and self-limited.  Can use OTC pain relievers as needed and will contact clinic if not controlled.   Skin/Nail Changes: cause, s/s, ways to manage Rare, but possible risk of SJS/TENS with Erleada. Discussed signs/symptoms and when to seek medical attention.   Organ Toxicities: cause, s/s, need for diagnostic tests, labs, when to notify MD Discussed potential effects on organ systems, monitoring, diagnostic tests, labs, and when to notify their MD. Discussed the signs/symptoms of the following: cardiotoxicity,    Miscellaneous • Financial Issues: First fill for $0 at [facility] with free trial card.  He was denied for free drug, but will be working with [employer] for copay assistance for the future fills of Erleada.  Met with Theresa Blanco, Financial Navigator who will work with him on this.  • Lab Draws: Monthly for Erleada, Q3 months for zoledronic acid and leuprolide  • Miscellaneous: Rare, but possible with leuprolide- the possible risk of VTE or PE.  Reviewed the signs and symptoms and stressed the urgency to call 911 immediately.  • Erleada - increased risk of falls/fractures. Will start on zoledronic acid IV every 3 months, recommended starting calcium and vitamin D (described above)  • Leuprolide - decreased libido, impotence, breast tenderness, depression, injection-site pain, peripheral edema.  • Zoledronic acid - rare, but possible risk of osteonecrosis of the jaw which can occur after many years of therapy.  Recommended avoiding invasive dental procedures or discussing with Dr. Ugalde if he needs to have any dental surgery.  Instructed him to contact clinic if he's having jaw pain.   Infertility and Sexuality:  causes, fertility preservation options, sexuality changes, ways to manage, importance of birth control Oral  Oncology Therapy: Reviewed safe sex practices and minimizing exposure to body fluids while on oral oncology therapy. and The patient is not sexually active with a woman of childbearing potential.   Home Care: how to manage bodily fluids Counseled on management of soiled linens and proper flush technique.  Discussed how to manage all the side effects at home and advised when to contact the MD office     Adherence and Self-Administration  • Barriers to Patient Adherence and/or Self-Administration: None  • Methods for Supporting Patient Adherence and/or Self-Administration: dosing calendar  • Expected duration of therapy: Until disease progression or intolerable toxicity    Goals of Therapy  • Patient Goals of Therapy:   o Consistently take medications as prescribed  o Patient will adhere to medication regimen  o Patient will report any medication side effects to healthcare provider  • Clinical Goals:   o Support patient understanding of medication regimen  o Ensure patient knows the pharmacy contact information  o Schedule regular follow-up to monitor the treatment serious adverse events  o Schedule regular follow-up to confirm medication adherence  o Schedule regular follow-up to monitor disease progression or stabilty    Quality of Life Assessment   The patient's current (pre-therapy) quality of life was discussed with the patient. The QOL segment of this outreach has been reviewed and updated.   • Quality of Life Score: 10/10    Reassessment Plan & Follow-Up  1. Pharmacist to perform regular reassessments no more than (6) months from the previous assessment.  2. Welcome information and patient satisfaction survey to be sent by retail team with patient's initial fill.  3. Care Coordinator to set up future refill outreaches, coordinate prescription delivery, and escalate clinical questions to pharmacist.     Additional Plans, Therapy Recommendations or Therapy Problems to Be Addressed: Patient was taken to the  pharmacy after education to  the medication.    Attestation  I attest that the initiated specialty medications are appropriate for the patient based on my assessment.  If the prescribed therapy is at any point deemed not appropriate based on the current or future assessments, a consultation will be initiated with the patient's specialty care provider to determine the best course of action. The revised plan of therapy will be documented along with any additional patient education provided.     Halima Quintana PharmD, [facility]  Oncology Clinical Pharmacist  Date and Time: 1/20/2023 12:54 EDT

## 2023-01-20 NOTE — PROGRESS NOTES
CHEMOTHERAPY PREPARATION    Madi Blanco  [id number]  1950    Subjective   Chief Complaint: Treatment Preparation and Needs Assessment    History of present illness:  Madi Blanco is a 72 y.o. year old male who is here today for chemotherapy preparation and needs assessment. The patient has been diagnosed with metastatic prostate cancer and is scheduled to begin oral and IV treatment with apalutamide/Lupron/Zometa.     Oncology History:    Oncology/Hematology History   Prostate cancer (HCC)   11/22/2021 Initial Diagnosis    Prostate cancer (HCC)     11/22/2021 Cancer Staged      Staging form: Prostate, AJCC 8th Edition  - Clinical stage from 11/22/2021: Stage IIIA (cT2c, cN0, cM0, PSA: 53.4, Grade Group: 3) - Signed by Remigio Haines MD on 1/17/2022 11/23/2021 Biopsy         2/16/2022 -  Hormonal Therapy    1. Lupron 45 mg Q 6M on  2/16/22 and 8/17/22  2. Starts Lupron 22.5 mg Q3M on 2/22/23     3/9/2022 - 4/18/2022 Radiation    Radiation OncologyTreatment Course:  Madi Blanco received 7000 cGy in 28 fractions to prostate gland and nodes via External Beam Radiation - EBRT.     11/25/2022 Progression    Lab Results   Component Value Date    PSA 35.100 (H) 01/04/2023    PSA 10.5 (H) 11/25/2022    PSA 2.7 08/23/2022    PSA 53.4 (H) 11/11/2021    PSA 48.400 (H) 10/22/2021          12/31/2022 Imaging    NM PET/CT Skull Base to Mid Thigh    Result Date: 12/31/2022  Widespread bony metastatic disease as described, much of which is not apparent on CT scan. No evidence of solid organ disease or hypermetabolic adenopathy.  This report was finalized on 12/31/2022 9:15 AM by Dr. Antonio Rosenthal MD.           1/18/2023 -  Hormonal Therapy    Apalutamide      2/22/2023 -  Treatment    Zometa Q12W for Bone Metastasis     Bone metastases (HCC)   1/4/2023 Initial Diagnosis    Bone metastases (HCC)         The current medication list and allergy list were reviewed and reconciled.     Past Medical History, Past Surgical  "History, Social History, Family History have been reviewed and are without significant changes except as mentioned.      Review of Systems    Objective   Physical Exam  Vital Signs: BP (!) 185/79   Pulse 89   Temp 98.6 °F (37 °C)   Resp 18   Ht 185.4 cm (73\")   Wt 93.9 kg (207 lb)   SpO2 97%   BMI 27.31 kg/m²    Vitals:    01/20/23 1430   PainSc: 0-No pain           General Appearance:  alert, cooperative, no apparent distress and appears stated age   Neurologic/Psychiatric: A&O x 3, gait steady, appropriate affect   HEENT:  Normocephalic, without obvious abnormality, mucous membranes moist   Lungs:   Clear to auscultation bilaterally; respirations regular, even, and unlabored bilaterally   Heart:  Regular rate and rhythm, no murmurs appreciated   Extremities: Normal, atraumatic; no clubbing, cyanosis, or edema    Skin: No rashes, lesions, or abnormal coloration noted     ECOG Performance Status: 0 - Asymptomatic            NEEDS ASSESSMENTS    Genetics  The patient's new diagnosis and family history have been reviewed for genetic counseling needs. A genetic referral is not recommended.     Psychosocial  The patient has completed a PHQ-9 Depression Screening and the Distress Thermometer (DT) today.   PHQ-9 results show 0 (No Depression). The patient scored their distress today as 0 on a scale of 0-10 with 0 being no distress and 10 being extreme distress.   Problems marked as being an issue for him within the last week include no problems.   Results were reviewed along with psychosocial resources offered by our cancer center. Our oncology social worker will be flagged for a DT score of 4 or above, and a same day call will be made for a score of 9 or 10. A mental health referral is not recommended at this time. The patient is not accepting of a referral to [facility].   Copies of patient's questionnaires will be scanned into EMR for details and further reference.    Barriers to care  A barriers form was " "also completed by the patient today. We discussed services offered by our facility to help him have adequate access to care. The patient was given the name and card for our Oncology Social Worker. Based upon barriers assessment today, the patient will not require a follow-up call from the  to further discuss needs.   A copy of the barriers form will also be scanned into EMR for details and further reference.     VAD Assessment  The patient and I discussed planned intervenous chemotherapy as well as other IV treatments that are often needed throughout the course of treatment. These may include, but are not limited to blood transfusions, antibiotics, and IV hydration. The patient's vasculature does appear to be adequate for multiple peripheral IVs throughout their treatment course. Discussed risks and benefits of VADs. The patient would not like to pursue Port-A-Cath insertion prior to initiation of treatment.       Advance Care Planning   The patient and I discussed advanced care planning, \"Conversations that Matter\".   This service was offered, free of charge, for development of advance directives with a certified ACP facilitator.  The patient does have an up-to-date advanced directive. This document is not on file with our office. The patient is not interested in an appointment with one of our facilitators to create or update their advanced directives.         Palliative Care  The patient and I discussed palliative care services. Palliative care is not the same as Hospice care. This is specialized medical care for people living with serious illness with the goal of improving quality of life for the patient and their family. [facility] has partnered with [facility] to offer our patients outpatient palliative care early along with their treatment to assist in coordination of care, symptom management, pain management, and medical decision making.  Oncology criteria for palliative care referral " is not met at this time. The patient is not interested in a palliative care consultation.     Additional Referral needs  none      IV CHEMOTHERAPY EDUCATION    Booklets Given: Chemotherapy and You [x]  Eating Hints [x]    Sexuality/Fertility Books []      Chemotherapy/Biotherapy Education Sheets: (list all that apply)  nausea management, acid reflux management, diarrhea management, Cancer resourse contacts information, skin and mouth care and vaccination information                                                                                                                                                                 Chemotherapy Regimen:   Treatment Plans     Name Type Plan Dates Plan Provider         Active    OP SUPPORTIVE Leuprolide 22.5 mg Q3M ONCOLOGY SUPPORTIVE CARE 1  2/22/2023 - Present Deja Roa MD     OP SUPPORTIVE Zoledronic Acid Q12W ONCOLOGY SUPPORTIVE CARE 2  2/22/2023 - Present Deja Roa MD     OP PROSTATE Apalutamide ONCOLOGY TREATMENT  1/10/2023 - Present Deja Roa MD                                                                                                       ORAL CHEMOTHERAPY TEACHING    Oral Chemotherapy Regimen: apalutamide/Lupron/Zometa    Date of Medication Start: Apalutamide to begin 1/21/2023      DETAILED CHEMOTHERAPY TEACHING COMPLETED BY PHARMACY. CHEMOTHERAPY CONSENT COMPLETED BY PHARMACY. SEE PHARMACY EDUCATION FOR DOCUMENTATION.     Chemotherapy education comprehension reviewed. Questions answered and additional information discussed on topics including:  Anemia, Thrombocytopenia, Neutropenia, Nutrition and appetite changes, Diarrhea, Pain and Organ toxicities        Assessment and Plan:    Diagnoses and all orders for this visit:    1. Prostate cancer (HCC) (Primary)    2. Bone metastases (HCC)          The patient and I have reviewed their new cancer diagnosis and scheduled treatment plan. Needs assessment was completed including  genetics, psychosocial needs, barriers to care, VAD evaluation, advanced care planning, and palliative care services. Referrals have been ordered as appropriate based upon our evaluation and patient desires.     IV and oral chemotherapy teaching was also completed today as documented above. Adequate time was given to answer all questions to his satisfaction. Patient and family are aware of their care team members and contact information if they have questions or problems throughout the treatment course. Needs assessments and education has been completed. The patient is adequately prepared to begin treatment as scheduled.      Michelle Rees, APRN     01/20/23

## 2023-01-30 ENCOUNTER — OFFICE VISIT (OUTPATIENT)
Dept: FAMILY MEDICINE CLINIC | Facility: CLINIC | Age: 73
End: 2023-01-30
Payer: MEDICARE

## 2023-01-30 VITALS
DIASTOLIC BLOOD PRESSURE: 88 MMHG | HEART RATE: 72 BPM | HEIGHT: 73 IN | TEMPERATURE: 96.9 F | WEIGHT: 207 LBS | BODY MASS INDEX: 27.43 KG/M2 | OXYGEN SATURATION: 94 % | RESPIRATION RATE: 16 BRPM | SYSTOLIC BLOOD PRESSURE: 152 MMHG

## 2023-01-30 DIAGNOSIS — Z23 IMMUNIZATION DUE: ICD-10-CM

## 2023-01-30 DIAGNOSIS — I10 PRIMARY HYPERTENSION: Primary | ICD-10-CM

## 2023-01-30 PROCEDURE — 99213 OFFICE O/P EST LOW 20 MIN: CPT | Performed by: FAMILY MEDICINE

## 2023-01-30 RX ORDER — AMLODIPINE BESYLATE AND BENAZEPRIL HYDROCHLORIDE 5; 40 MG/1; MG/1
1 CAPSULE ORAL DAILY
Qty: 90 CAPSULE | Refills: 1 | Status: SHIPPED | OUTPATIENT
Start: 2023-01-30 | End: 2023-03-02 | Stop reason: SDUPTHER

## 2023-01-30 NOTE — PROGRESS NOTES
Subjective   Madi Blanco is a 72 y.o. male    Chief Complaint    Hypertension    History of Present Illness  The patient is now on benazepril 40 mg daily. His blood pressure today is 152/88 mmHg.    He reports he is doing well. The patient said he is taking a total of 40 mg of benazepril daily. He said he feels fine. The patient said he got it through [facility] because it is a whole lot cheaper. He said it is an online prescription and he thinks he has it in his system.    The patient said he has never had a pneumonia vaccine.    The following portions of the patient's history were reviewed and updated as appropriate: allergies, current medications, past social history and problem list    Review of Systems   Constitutional: Negative for fatigue and unexpected weight change.   Respiratory: Negative for cough, chest tightness and shortness of breath.    Cardiovascular: Negative for chest pain, palpitations and leg swelling.   Gastrointestinal: Negative for nausea.   Musculoskeletal: Negative for neck pain.   Skin: Negative for color change and rash.   Neurological: Negative for dizziness, syncope, weakness and headaches.       Objective     Vitals:    01/30/23 1504   BP: 152/88   Pulse: 72   Resp: 16   Temp: 96.9 °F (36.1 °C)   SpO2: 94%       Physical Exam  Vitals and nursing note reviewed.   Constitutional:       General: He is not in acute distress.     Appearance: Normal appearance. He is well-developed. He is not ill-appearing, toxic-appearing or diaphoretic.   Neck:      Vascular: No carotid bruit or JVD.   Cardiovascular:      Rate and Rhythm: Normal rate and regular rhythm.      Pulses: Normal pulses.      Heart sounds: Normal heart sounds. No murmur heard.  Pulmonary:      Effort: Pulmonary effort is normal. No respiratory distress.      Breath sounds: Normal breath sounds.   Abdominal:      Palpations: Abdomen is soft.      Tenderness: There is no abdominal tenderness.   Skin:     General: Skin is warm  and dry.   Neurological:      Mental Status: He is alert.         Assessment & Plan   Problems Addressed this Visit    None  Visit Diagnoses     Primary hypertension    -  Primary    Relevant Medications    amLODIPine-benazepril (LOTREL) 5-40 MG per capsule    Immunization due          Diagnoses       Codes Comments    Primary hypertension    -  Primary ICD-10-CM: I10  ICD-9-CM: 401.9     Immunization due     ICD-10-CM: Z23  ICD-9-CM: V05.9         I spent 20 minutes in patient care: Reviewing records prior to the visit, examining the patient, entering orders and documentation    Part of this note may be an electronic transcription/translation of spoken language to printed text using the Dragon Dictation System.         Transcribed from ambient dictation for CLAIRE Bryan MD by Lucrecia Carvalho.  01/30/23   15:58 EST    Patient or patient representative verbalized consent to the visit recording.  I have personally performed the services described in this document as transcribed by the above individual, and it is both accurate and complete.

## 2023-02-02 ENCOUNTER — TELEPHONE (OUTPATIENT)
Dept: FAMILY MEDICINE CLINIC | Facility: CLINIC | Age: 73
End: 2023-02-02

## 2023-02-02 NOTE — TELEPHONE ENCOUNTER
Looks like he was taking Benazepril 20mg and taking 2 pills a day at that time- during visit you sent in Amlodipine-benazapril 5/40mg. Is that what you meant to do? Please advise.

## 2023-02-02 NOTE — TELEPHONE ENCOUNTER
Spoke with pharmacy and they verified they do have the medication but won't ship it until they hear from the patient. Pt notified and he said he would call them.

## 2023-02-02 NOTE — TELEPHONE ENCOUNTER
Caller: Madi Blanco    Relationship: Self    Best call back number: [phone]    What is the best time to reach you: ANYTIME     Who are you requesting to speak with (clinical staff, provider,  specific staff member): CLINICAL STAFF      What was the call regarding: THE PATIENT STATES THAT HE WAS SEEN Monday 01/30/2023 THE PATIENT STATES THAT THE DOCTOR WAS GOING TO CHANGE HIS BLOOD PRESSURE MEDICATION THE PATIENT STATES THAT HE IS TRYING TO GET THE MEDICATION FROM [facility] BUT THEY TOLD HIM THAT THEY HAVE NOT RECEIVED A PRESCRIPTION PLEASE CALL PATIENT TO LET HIM KNOW IF THAT WAS SENT IN     Do you require a callback: YES

## 2023-02-02 NOTE — TELEPHONE ENCOUNTER
That is correct.  Basically I just added amlodipine 5 mg.  He was already taking 40 mg of benazepril.

## 2023-02-09 ENCOUNTER — TELEPHONE (OUTPATIENT)
Dept: ONCOLOGY | Facility: CLINIC | Age: 73
End: 2023-02-09
Payer: MEDICARE

## 2023-02-09 NOTE — TELEPHONE ENCOUNTER
Returned call to patient discussing with him his reported belly pain and that to help relieve it since it improved after passing gas. He could keep Mylicon or gas x on hand to help. Discussing with him also that Dr. Ugalde had samples of the Erlenda and he can come by to get that. Patient stating he understood and would be by tomorrow to .

## 2023-02-09 NOTE — TELEPHONE ENCOUNTER
----- Message from Miranda Quintana, [facility] sent at 2/8/2023  3:12 PM EST -----  Mayco Bethea,    This patient called me saying since he's started Erleada he's been having some pain in his ribs on the left side of his abdomen.  He said he passed gas yesterday and the pain significantly decreased (rated it a 1 out of 5 - 1 mild 5 horrible) as of today, but said he's still having some pain that wasn't there before he started Erleada.    I didn't really know what to make of it and thought it was probably related to his diet.  He mentioned that he'd been eating a lot of Mexican food lately and thought that could be contributing.  I wondered if he was constipated, but he told me he wasn't.    Halima

## 2023-02-13 ENCOUNTER — TELEPHONE (OUTPATIENT)
Dept: ONCOLOGY | Facility: CLINIC | Age: 73
End: 2023-02-13
Payer: MEDICARE

## 2023-02-13 DIAGNOSIS — R07.81 RIB PAIN ON LEFT SIDE: ICD-10-CM

## 2023-02-13 DIAGNOSIS — C79.51 BONE METASTASES: Primary | ICD-10-CM

## 2023-02-13 NOTE — TELEPHONE ENCOUNTER
Returned call to patient's significant other. Discussing with her patient complaints of pain. At this time she reports patient is still having pain. Stating it's on left side and it's an ongoing pain that is constant. Patient's significant other stating that patient is unable to tell if it's exactly rib or not. Stating he asked significant other to call. Stating he forgets what he is told. Informing her that nurse will call back once discussed with Dr. Ugalde.     Called patient's significant other at this time, informing her that patient is going to have referral sent to radiation to follow up and see if patient needs radiation to rib site. Patient's significant other stating she understood plan.

## 2023-02-13 NOTE — TELEPHONE ENCOUNTER
----- Message from Miranda Quintana [facility] sent at 2/10/2023  3:46 PM EST -----  Regarding: Left me a VM about pain in side  [place],    This patient's girlfriend Hellen left me a VM saying that he's still having the pain in his side and she thinks he needs to be looked at.  She asked that you call her back instead of him.      [phone]    Thanks,  Halima

## 2023-02-15 ENCOUNTER — TELEPHONE (OUTPATIENT)
Dept: ONCOLOGY | Facility: CLINIC | Age: 73
End: 2023-02-15
Payer: MEDICARE

## 2023-02-15 NOTE — TELEPHONE ENCOUNTER
Caller: Madi Blanco    Relationship: Self    Best call back number: [phone]      What was the call regarding: PT IS GOING OVER TO [facility] TO GET HIS LABS DONE AND WANTED TO MAKE SURE THE ORDER WAS IN      Do you require a callback: YES

## 2023-02-17 ENCOUNTER — APPOINTMENT (OUTPATIENT)
Dept: ONCOLOGY | Facility: HOSPITAL | Age: 73
End: 2023-02-17
Payer: MEDICARE

## 2023-02-17 ENCOUNTER — HOSPITAL ENCOUNTER (OUTPATIENT)
Dept: RADIATION ONCOLOGY | Facility: HOSPITAL | Age: 73
Setting detail: RADIATION/ONCOLOGY SERIES
Discharge: HOME | End: 2023-02-17
Payer: MEDICARE

## 2023-02-17 ENCOUNTER — LAB (OUTPATIENT)
Dept: LAB | Facility: HOSPITAL | Age: 73
End: 2023-02-17
Payer: MEDICARE

## 2023-02-17 ENCOUNTER — OFFICE VISIT (OUTPATIENT)
Dept: RADIATION ONCOLOGY | Facility: HOSPITAL | Age: 73
End: 2023-02-17
Payer: MEDICARE

## 2023-02-17 VITALS
SYSTOLIC BLOOD PRESSURE: 145 MMHG | BODY MASS INDEX: 27.05 KG/M2 | HEART RATE: 68 BPM | HEIGHT: 73 IN | OXYGEN SATURATION: 95 % | DIASTOLIC BLOOD PRESSURE: 68 MMHG | RESPIRATION RATE: 16 BRPM | WEIGHT: 204.1 LBS | TEMPERATURE: 97.3 F

## 2023-02-17 DIAGNOSIS — C79.51 BONE METASTASES: Primary | ICD-10-CM

## 2023-02-17 DIAGNOSIS — C79.51 BONE METASTASES: ICD-10-CM

## 2023-02-17 DIAGNOSIS — C61 PROSTATE CANCER: ICD-10-CM

## 2023-02-17 LAB
ALBUMIN SERPL-MCNC: 4.6 G/DL (ref 3.5–5.2)
ALBUMIN/GLOB SERPL: 1.8 G/DL
ALP SERPL-CCNC: 129 U/L (ref 39–117)
ALT SERPL W P-5'-P-CCNC: 20 U/L (ref 1–41)
ANION GAP SERPL CALCULATED.3IONS-SCNC: 10 MMOL/L (ref 5–15)
AST SERPL-CCNC: 23 U/L (ref 1–40)
BASOPHILS # BLD AUTO: 0.08 10*3/MM3 (ref 0–0.2)
BASOPHILS NFR BLD AUTO: 1.9 % (ref 0–1.5)
BILIRUB SERPL-MCNC: 0.3 MG/DL (ref 0–1.2)
BUN SERPL-MCNC: 14 MG/DL (ref 8–23)
BUN/CREAT SERPL: 15.4 (ref 7–25)
CALCIUM SPEC-SCNC: 9.7 MG/DL (ref 8.6–10.5)
CHLORIDE SERPL-SCNC: 102 MMOL/L (ref 98–107)
CO2 SERPL-SCNC: 27 MMOL/L (ref 22–29)
CREAT SERPL-MCNC: 0.91 MG/DL (ref 0.76–1.27)
DEPRECATED RDW RBC AUTO: 44.6 FL (ref 37–54)
EGFRCR SERPLBLD CKD-EPI 2021: 90.7 ML/MIN/1.73
EOSINOPHIL # BLD AUTO: 0.23 10*3/MM3 (ref 0–0.4)
EOSINOPHIL NFR BLD AUTO: 5.4 % (ref 0.3–6.2)
ERYTHROCYTE [DISTWIDTH] IN BLOOD BY AUTOMATED COUNT: 12.7 % (ref 12.3–15.4)
GLOBULIN UR ELPH-MCNC: 2.5 GM/DL
GLUCOSE SERPL-MCNC: 98 MG/DL (ref 65–99)
HCT VFR BLD AUTO: 43.7 % (ref 37.5–51)
HGB BLD-MCNC: 14.9 G/DL (ref 13–17.7)
IMM GRANULOCYTES # BLD AUTO: 0.02 10*3/MM3 (ref 0–0.05)
IMM GRANULOCYTES NFR BLD AUTO: 0.5 % (ref 0–0.5)
LYMPHOCYTES # BLD AUTO: 1.05 10*3/MM3 (ref 0.7–3.1)
LYMPHOCYTES NFR BLD AUTO: 24.6 % (ref 19.6–45.3)
MAGNESIUM SERPL-MCNC: 2.1 MG/DL (ref 1.6–2.4)
MCH RBC QN AUTO: 32.6 PG (ref 26.6–33)
MCHC RBC AUTO-ENTMCNC: 34.1 G/DL (ref 31.5–35.7)
MCV RBC AUTO: 95.6 FL (ref 79–97)
MONOCYTES # BLD AUTO: 0.46 10*3/MM3 (ref 0.1–0.9)
MONOCYTES NFR BLD AUTO: 10.8 % (ref 5–12)
NEUTROPHILS NFR BLD AUTO: 2.42 10*3/MM3 (ref 1.7–7)
NEUTROPHILS NFR BLD AUTO: 56.8 % (ref 42.7–76)
NRBC BLD AUTO-RTO: 0 /100 WBC (ref 0–0.2)
PHOSPHATE SERPL-MCNC: 3.8 MG/DL (ref 2.5–4.5)
PLATELET # BLD AUTO: 213 10*3/MM3 (ref 140–450)
PMV BLD AUTO: 10.4 FL (ref 6–12)
POTASSIUM SERPL-SCNC: 4.4 MMOL/L (ref 3.5–5.2)
PROT SERPL-MCNC: 7.1 G/DL (ref 6–8.5)
PSA SERPL-MCNC: 28.9 NG/ML (ref 0–4)
RBC # BLD AUTO: 4.57 10*6/MM3 (ref 4.14–5.8)
SODIUM SERPL-SCNC: 139 MMOL/L (ref 136–145)
WBC NRBC COR # BLD: 4.26 10*3/MM3 (ref 3.4–10.8)

## 2023-02-17 PROCEDURE — 84153 ASSAY OF PSA TOTAL: CPT

## 2023-02-17 PROCEDURE — 80053 COMPREHEN METABOLIC PANEL: CPT

## 2023-02-17 PROCEDURE — 83735 ASSAY OF MAGNESIUM: CPT

## 2023-02-17 PROCEDURE — 36415 COLL VENOUS BLD VENIPUNCTURE: CPT

## 2023-02-17 PROCEDURE — G0463 HOSPITAL OUTPT CLINIC VISIT: HCPCS

## 2023-02-17 PROCEDURE — 84100 ASSAY OF PHOSPHORUS: CPT

## 2023-02-17 PROCEDURE — 85025 COMPLETE CBC W/AUTO DIFF WBC: CPT

## 2023-02-22 ENCOUNTER — SPECIALTY PHARMACY (OUTPATIENT)
Dept: ONCOLOGY | Facility: HOSPITAL | Age: 73
End: 2023-02-22
Payer: MEDICARE

## 2023-02-22 ENCOUNTER — HOSPITAL ENCOUNTER (OUTPATIENT)
Dept: ONCOLOGY | Facility: HOSPITAL | Age: 73
Discharge: HOME | End: 2023-02-22
Admitting: INTERNAL MEDICINE
Payer: MEDICARE

## 2023-02-22 ENCOUNTER — OFFICE VISIT (OUTPATIENT)
Dept: ONCOLOGY | Facility: CLINIC | Age: 73
End: 2023-02-22
Payer: MEDICARE

## 2023-02-22 VITALS
OXYGEN SATURATION: 98 % | RESPIRATION RATE: 18 BRPM | HEIGHT: 73 IN | SYSTOLIC BLOOD PRESSURE: 158 MMHG | TEMPERATURE: 97.1 F | HEART RATE: 66 BPM | WEIGHT: 203 LBS | DIASTOLIC BLOOD PRESSURE: 78 MMHG | BODY MASS INDEX: 26.9 KG/M2

## 2023-02-22 DIAGNOSIS — C79.51 BONE METASTASES: Primary | ICD-10-CM

## 2023-02-22 DIAGNOSIS — C79.51 BONE METASTASES: ICD-10-CM

## 2023-02-22 DIAGNOSIS — C61 PROSTATE CANCER: ICD-10-CM

## 2023-02-22 PROCEDURE — 25010000002 LEUPROLIDE 22.5 MG KIT: Performed by: INTERNAL MEDICINE

## 2023-02-22 PROCEDURE — 99214 OFFICE O/P EST MOD 30 MIN: CPT | Performed by: INTERNAL MEDICINE

## 2023-02-22 PROCEDURE — 25010000002 ZOLEDRONIC ACID PER 1 MG: Performed by: INTERNAL MEDICINE

## 2023-02-22 PROCEDURE — 96402 CHEMO HORMON ANTINEOPL SQ/IM: CPT

## 2023-02-22 PROCEDURE — 96374 THER/PROPH/DIAG INJ IV PUSH: CPT

## 2023-02-22 RX ORDER — SODIUM CHLORIDE 9 MG/ML
250 INJECTION, SOLUTION INTRAVENOUS ONCE
OUTPATIENT
Start: 2023-05-17

## 2023-02-22 RX ADMIN — LEUPROLIDE ACETATE 22.5 MG: KIT SUBCUTANEOUS at 13:56

## 2023-02-22 RX ADMIN — ZOLEDRONIC ACID 4 MG: 4 INJECTION, SOLUTION, CONCENTRATE INTRAVENOUS at 13:45

## 2023-02-22 NOTE — PROGRESS NOTES
PROBLEM LIST:  Oncology/Hematology History   Prostate cancer (HCC)   11/22/2021 Initial Diagnosis    Prostate cancer (HCC)     11/22/2021 Cancer Staged      Staging form: Prostate, AJCC 8th Edition  - Clinical stage from 11/22/2021: Stage IIIA (cT2c, cN0, cM0, PSA: 53.4, Grade Group: 3) - Signed by Remigio Haines MD on 1/17/2022 11/23/2021 Biopsy         2/16/2022 -  Hormonal Therapy    1. Lupron 45 mg Q 6M on  2/16/22 and 8/17/22  2. Starts Lupron 22.5 mg Q3M on 2/22/23     3/9/2022 - 4/18/2022 Radiation    Radiation OncologyTreatment Course:  Madi Blanco received 7000 cGy in 28 fractions to prostate gland and nodes via External Beam Radiation - EBRT.     11/25/2022 Progression    Lab Results   Component Value Date    PSA 35.100 (H) 01/04/2023    PSA 10.5 (H) 11/25/2022    PSA 2.7 08/23/2022    PSA 53.4 (H) 11/11/2021    PSA 48.400 (H) 10/22/2021          12/31/2022 Imaging    NM PET/CT Skull Base to Mid Thigh    Result Date: 12/31/2022  Widespread bony metastatic disease as described, much of which is not apparent on CT scan. No evidence of solid organ disease or hypermetabolic adenopathy.  This report was finalized on 12/31/2022 9:15 AM by Dr. Antonio Rosenthal MD.           1/18/2023 -  Hormonal Therapy    Apalutamide      2/22/2023 -  Treatment    Zometa Q12W for Bone Metastasis     Bone metastases (HCC)   1/4/2023 Initial Diagnosis    Bone metastases (HCC)         REASON FOR VISIT: Metastatic Prostate Cancer    HISTORY OF PRESENT ILLNESS:   72 y.o.  male presents today for follow-up of his prostate cancer.  He is having some issues with pain in the side.  But otherwise doing well.  Tolerating apalutamide with Lupron.  He is also getting Zometa for his bone metastases.  Denies any issues with nausea vomiting diarrhea.  No issues with headaches.  No hot flashes.    Past medical history, social history and family history was reviewed 02/22/23 and unchanged from prior visit.    Review of Systems:    Review of  "Systems   Constitutional: Negative.    HENT:  Negative.    Eyes: Negative.    Respiratory: Negative.    Cardiovascular: Negative.    Gastrointestinal: Negative.    Endocrine: Negative.    Genitourinary: Negative.     Musculoskeletal: Positive for back pain.   Skin: Negative.    Neurological: Negative.    Hematological: Negative.    Psychiatric/Behavioral: Negative.             Medications:        Current Outpatient Medications:   •  amLODIPine-benazepril (LOTREL) 5-40 MG per capsule, Take 1 capsule by mouth Daily., Disp: 90 capsule, Rfl: 1  •  Apalutamide 60 MG tablet, Take 4 tablets by mouth Daily., Disp: 120 tablet, Rfl: 11  •  ondansetron (ZOFRAN) 8 MG tablet, Take 1 tablet by mouth 3 (Three) Times a Day As Needed for Nausea or Vomiting., Disp: 30 tablet, Rfl: 5  •  tamsulosin (FLOMAX) 0.4 MG capsule 24 hr capsule, Take 1 capsule by mouth Every Night., Disp: 90 capsule, Rfl: 3           ALLERGIES:    Allergies   Allergen Reactions   • Penicillins Hives         Physical Exam    VITAL SIGNS:  /78 Comment: LUE  Pulse 66   Temp 97.1 °F (36.2 °C) (Infrared)   Resp 18   Ht 185.4 cm (73\")   Wt 92.1 kg (203 lb)   SpO2 98% Comment: RA  BMI 26.78 kg/m²     ECOG score: 0           Wt Readings from Last 3 Encounters:   02/22/23 92.1 kg (203 lb)   02/17/23 92.6 kg (204 lb 1.6 oz)   01/30/23 93.9 kg (207 lb)       Body mass index is 26.78 kg/m². Body surface area is 2.17 meters squared.       Performance Status: 0    General: well appearing, in no acute distress  HEENT: sclera anicteric, neck is supple  Lymphatics: no cervical, supraclavicular, or axillary adenopathy  Cardiovascular: regular rate and rhythm, no murmurs, rubs or gallops  Lungs: clear to auscultation bilaterally  Abdomen: soft, nontender, nondistended.  No palpable organomegaly  Extremities: no lower extremity edema  Skin: no rashes, lesions, bruising, or petechiae  Msk:  Shows no weakness of the large muscle groups  Psych: Mood is " stable        RECENT LABS:    Lab Results   Component Value Date    HGB 14.9 02/17/2023    HCT 43.7 02/17/2023    MCV 95.6 02/17/2023     02/17/2023    WBC 4.26 02/17/2023    NEUTROABS 2.42 02/17/2023    LYMPHSABS 1.05 02/17/2023    MONOSABS 0.46 02/17/2023    EOSABS 0.23 02/17/2023    BASOSABS 0.08 02/17/2023       Lab Results   Component Value Date    GLUCOSE 98 02/17/2023    BUN 14 02/17/2023    CREATININE 0.91 02/17/2023     02/17/2023    K 4.4 02/17/2023     02/17/2023    CO2 27.0 02/17/2023    CALCIUM 9.7 02/17/2023    PROTEINTOT 7.1 02/17/2023    ALBUMIN 4.6 02/17/2023    BILITOT 0.3 02/17/2023    ALKPHOS 129 (H) 02/17/2023    AST 23 02/17/2023    ALT 20 02/17/2023     Lab Results   Component Value Date    PSA 28.900 (H) 02/17/2023    PSA 35.100 (H) 01/04/2023    PSA 10.5 (H) 11/25/2022    PSA 2.7 08/23/2022    PSA 53.4 (H) 11/11/2021         NM PET/CT Skull Base to Mid Thigh    Result Date: 12/31/2022  Widespread bony metastatic disease as described, much of which is not apparent on CT scan. No evidence of solid organ disease or hypermetabolic adenopathy.  This report was finalized on 12/31/2022 9:15 AM by Dr. Antonio Rosenthal MD.            Assessment/Plan    1.  Metastatic prostate cancer.  Patient currently on apalutamide along with Lupron and Zometa for his bone metastases.  His PSA is starting to improve some.  There was some delay in the apalutamide due to the cost.  The co-pay is exorbitantly high.    2.  Pain secondary to bone metastases.  Dr. Haines is considering Xofigo to help with the pain.  He is going to get a bone scan prior to that.        Deja Roa MD  [facility]    Return on: 05/17/23  Return in (Approximately): 3 months, Schedule with next infusion    Orders Placed This Encounter   Procedures   • Comprehensive metabolic panel   • Magnesium   • Phosphorus   • CBC and Differential       2/22/2023

## 2023-02-27 NOTE — PROGRESS NOTES
The patient was just approved for free drug for Erleada (apalutamide).  Will track 1st shipment to ensure there are no complications.      Halima Quintana, PharmD, [facility]  Oncology Clinical Pharmacist   Phone: [phone]    2/27/23  11:42 EDT

## 2023-03-02 ENCOUNTER — TELEPHONE (OUTPATIENT)
Dept: FAMILY MEDICINE CLINIC | Facility: CLINIC | Age: 73
End: 2023-03-02
Payer: MEDICARE

## 2023-03-02 DIAGNOSIS — C79.51 BONE METASTASES: ICD-10-CM

## 2023-03-02 DIAGNOSIS — C61 PROSTATE CANCER: ICD-10-CM

## 2023-03-02 RX ORDER — ONDANSETRON HYDROCHLORIDE 8 MG/1
8 TABLET, FILM COATED ORAL 3 TIMES DAILY PRN
Qty: 30 TABLET | Refills: 5 | Status: SHIPPED | OUTPATIENT
Start: 2023-03-02 | End: 2023-03-28

## 2023-03-02 RX ORDER — TAMSULOSIN HYDROCHLORIDE 0.4 MG/1
1 CAPSULE ORAL NIGHTLY
Qty: 90 CAPSULE | Refills: 3 | Status: SHIPPED | OUTPATIENT
Start: 2023-03-02

## 2023-03-02 RX ORDER — AMLODIPINE BESYLATE AND BENAZEPRIL HYDROCHLORIDE 5; 40 MG/1; MG/1
1 CAPSULE ORAL DAILY
Qty: 90 CAPSULE | Refills: 1 | Status: SHIPPED | OUTPATIENT
Start: 2023-03-02

## 2023-03-02 NOTE — TELEPHONE ENCOUNTER
Caller: [facility] - [place], [place] - [address] - [phone] [facility] [phone] FX    Relationship: Pharmacy    Best call back number: [phone]    Requested Prescriptions:   Requested Prescriptions     Pending Prescriptions Disp Refills   • tamsulosin (FLOMAX) 0.4 MG capsule 24 hr capsule 90 capsule 3     Sig: Take 1 capsule by mouth Every Night.   • amLODIPine-benazepril (LOTREL) 5-40 MG per capsule 90 capsule 1     Sig: Take 1 capsule by mouth Daily.   • ondansetron (ZOFRAN) 8 MG tablet 30 tablet 5     Sig: Take 1 tablet by mouth 3 (Three) Times a Day As Needed for Nausea or Vomiting.   • Apalutamide 60 MG tablet 120 tablet 11     Sig: Take 4 tablets by mouth Daily.        Pharmacy where request should be sent: [facility] - [place], [place] - [address] - [phone] [facility] [phone] FX     Additional details provided by patient: PHARMACY STATES THAT THEY FAXED OVER THIS REFILL REQUEST IN February BUT HAD NOT HEARD ANYTHING BACK YET.     PHARMACY IS UNSURE OF PATIENT'S CURRENT SUPPLY STATUS.     Oli Guerra Rep   03/02/23 15:44 EST

## 2023-03-03 ENCOUNTER — HOSPITAL ENCOUNTER (OUTPATIENT)
Dept: NUCLEAR MEDICINE | Facility: HOSPITAL | Age: 73
Discharge: HOME | End: 2023-03-03
Payer: MEDICARE

## 2023-03-03 DIAGNOSIS — C79.51 BONE METASTASES: ICD-10-CM

## 2023-03-03 PROCEDURE — 78306 BONE IMAGING WHOLE BODY: CPT

## 2023-03-03 PROCEDURE — A9503 TC99M MEDRONATE: HCPCS | Performed by: RADIOLOGY

## 2023-03-03 PROCEDURE — 0 TECHNETIUM MEDRONATE KIT: Performed by: RADIOLOGY

## 2023-03-03 RX ORDER — TC 99M MEDRONATE 20 MG/10ML
27.3 INJECTION, POWDER, LYOPHILIZED, FOR SOLUTION INTRAVENOUS
Status: COMPLETED | OUTPATIENT
Start: 2023-03-03 | End: 2023-03-03

## 2023-03-03 RX ADMIN — Medication 27.3 MILLICURIE: at 10:00

## 2023-03-14 ENCOUNTER — HOSPITAL ENCOUNTER (OUTPATIENT)
Dept: RADIATION ONCOLOGY | Facility: HOSPITAL | Age: 73
Setting detail: RADIATION/ONCOLOGY SERIES
Discharge: HOME | End: 2023-03-14
Payer: MEDICARE

## 2023-03-14 ENCOUNTER — OFFICE VISIT (OUTPATIENT)
Dept: RADIATION ONCOLOGY | Facility: HOSPITAL | Age: 73
End: 2023-03-14
Payer: MEDICARE

## 2023-03-14 VITALS
SYSTOLIC BLOOD PRESSURE: 163 MMHG | OXYGEN SATURATION: 99 % | RESPIRATION RATE: 20 BRPM | HEART RATE: 64 BPM | BODY MASS INDEX: 26.93 KG/M2 | WEIGHT: 204.1 LBS | TEMPERATURE: 97.3 F | DIASTOLIC BLOOD PRESSURE: 74 MMHG

## 2023-03-14 DIAGNOSIS — C79.51 BONE METASTASES: Primary | ICD-10-CM

## 2023-03-14 DIAGNOSIS — C61 PROSTATE CANCER: ICD-10-CM

## 2023-03-14 PROCEDURE — G0463 HOSPITAL OUTPT CLINIC VISIT: HCPCS

## 2023-03-14 NOTE — PROGRESS NOTES
RE-EVALUATION    PATIENT:                                                      Madi Blanco  :                                                          1950  DATE:                          3/14/2023   DIAGNOSIS:     Prostate cancer (HCC)  - Stage IIIA (cT2c, cN0, cM0, PSA: 53.4, Grade Group: 3)         BRIEF HISTORY:  The patient is a very pleasant 73 y.o. male  with metastatic castrate resistant prostate cancer.  He was previously treated with stereotactic body radiotherapy and androgen ablation.  PSA subsequently increased to a value of 35.1 in 2023.  PSMA PET scan showed widespread hypermetabolic bone metastases.  No organ involvement or adenopathy.  No evidence of recurrence in the lower pelvis prostate region.  He recently initiated apalutamide under the care of Dr. Ugalde.  He has tolerated that treatment well.  Recently he has had worsening persistent bilateral rib pain, worse on the left and worse with activities and standing.  Pain is up to a 5/10 in severity.    Nuclear medicine bone scan shows increased uptake in the bilateral ribs, worse on the left which correlates with his pain.  He also has uptake in the skull, cervical spine and thoracic spine consistent with bony metastases.  This correlates with his PET scan as well.    Labs from 2023  PSA 28.9 ng/ml  WBC 4.26  Hemoglobin 14.9  Hematocrit 43.7  Platelets 213,000  Alkaline phosphatase 129    Allergies   Allergen Reactions   • Penicillins Hives       Review of Systems   Musculoskeletal: Positive for arthralgias.        Left rib pain           Objective   VITAL SIGNS:   Vitals:    23 1300   BP: 163/74   Pulse: 64   Resp: 20   Temp: 97.3 °F (36.3 °C)   TempSrc: Skin   SpO2: 99%   Weight: 92.6 kg (204 lb 1.6 oz)   PainSc:   5   PainLoc: Rib Cage            KSP %:  80    Physical Exam  Vitals and nursing note reviewed.   Constitutional:       Appearance: He is well-developed.   HENT:      Head: Normocephalic and atraumatic.    Cardiovascular:      Rate and Rhythm: Normal rate and regular rhythm.      Heart sounds: Normal heart sounds. No murmur heard.  Pulmonary:      Effort: Pulmonary effort is normal.      Breath sounds: Normal breath sounds. No wheezing or rales.   Abdominal:      General: Bowel sounds are normal. There is no distension.      Palpations: Abdomen is soft.      Tenderness: There is no abdominal tenderness.   Musculoskeletal:         General: Tenderness ( Left anterior ribs just above the costal margin) present. Normal range of motion.      Cervical back: Normal range of motion and neck supple.   Lymphadenopathy:      Cervical: No cervical adenopathy.      Upper Body:      Right upper body: No supraclavicular adenopathy.      Left upper body: No supraclavicular adenopathy.   Skin:     General: Skin is warm and dry.   Neurological:      Mental Status: He is alert and oriented to person, place, and time.      Sensory: No sensory deficit.   Psychiatric:         Behavior: Behavior normal.         Thought Content: Thought content normal.         Judgment: Judgment normal.              The following portions of the patient's history were reviewed and updated as appropriate: allergies, current medications, past family history, past medical history, past social history, past surgical history and problem list.    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    He has persistent pain from bone metastasis in the left ribs, and spite of partial response from recent addition of apalutamide.  Nuclear medicine bone scan does correlate with sites of symptomatic disease.  Patient would be a good candidate for radionuclide therapy in order to target all sites of bony metastatic disease concurrently.  Blood counts are adequate for radionuclide therapy.  He would still be a candidate for localized external beam  radiotherapy in the future or other palliative measures, if needed.  Informed consent obtained today.    RECOMMENDATIONS:    Radium-223 radionuclide infusions will be delivered every 4 weeks, as tolerated, up to 6 doses, per Xofigo protocol.  He will return next week for his first infusion.       Remigio Haines MD

## 2023-03-23 ENCOUNTER — CLINICAL SUPPORT (OUTPATIENT)
Dept: RADIATION ONCOLOGY | Facility: HOSPITAL | Age: 73
End: 2023-03-23
Payer: MEDICARE

## 2023-03-23 VITALS
WEIGHT: 202.7 LBS | DIASTOLIC BLOOD PRESSURE: 73 MMHG | SYSTOLIC BLOOD PRESSURE: 162 MMHG | BODY MASS INDEX: 26.74 KG/M2 | HEART RATE: 65 BPM | RESPIRATION RATE: 20 BRPM | OXYGEN SATURATION: 98 % | TEMPERATURE: 97.2 F

## 2023-03-23 DIAGNOSIS — C79.51 BONE METASTASES: Primary | ICD-10-CM

## 2023-03-23 DIAGNOSIS — C41.9 MALIGNANT NEOPLASM OF BONE WITH METASTASES: ICD-10-CM

## 2023-03-23 PROCEDURE — G0463 HOSPITAL OUTPT CLINIC VISIT: HCPCS

## 2023-03-23 PROCEDURE — 77300 RADIATION THERAPY DOSE PLAN: CPT | Performed by: RADIOLOGY

## 2023-03-23 PROCEDURE — 79101 NUCLEAR RX IV ADMIN: CPT | Performed by: RADIOLOGY

## 2023-03-23 PROCEDURE — A9606 RADIUM RA223 DICHLORIDE THER: HCPCS | Performed by: RADIOLOGY

## 2023-03-23 NOTE — PROGRESS NOTES
SPECIAL TREATMENT PROCEDURE NOTE  INFUSION OF RADIONUCLIDE RADIUM-223    PATIENT:                                                      Madi Blanco  MEDICAL RECORD #:                        [id number]  :                                                          1950  DATE OF PROCEDURE:                       3/23/2023   DIAGNOSIS:     Bone Metastases  STAGE:     IV (M1)    PROCEDURE:  Injection of Radium-223 dichloride (Xofigo) 1 of 6 planned.    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  Radionuclide therapy is indicated for palliation.  Current pain 4/10 left ribs.  The inherent complexity of this case, based on multi-modality coordination of treatment and the interdisciplinary coordination of care for this patient justifies a special treatment procedure.  Informed consent was obtained and is documented in the chart.    LABORATORY:  Pre-injection CBC from   Lab Results   Component Value Date    WBC 4.26 2023    HGB 14.9 2023    HCT 43.7 2023    MCV 95.6 2023     2023     showed adequate values for radionuclide therapy.     PSA 28.9 ng/ml  This SmartLink has not been configured with any valid records.     Lab Results   Component Value Date    ALKPHOS 129 (H) 2023         PROCEDURE DESCRIPTION:  Radium-223 (Xofigo) was prescribed at 1.49 µCi/Kg.  We received a dose of 138 µCi in 8.5 mL.  Special dosimetry assay performed at [facility] confirmed 141 µCi dosing.  After adequate hydration, the entire volume was manually infused through slow intravenous push over 2 minutes with subsequent IV hydration to flush the line.  Postinfusion assay showed residual 0 µCi resulting in total infusion activity 141 µCi.    COMPLICATIONS:  None.  The patient tolerated the procedure well with no apparent side effects.    DISPOSITION:   Our physicist surveyed the room  and no residual radioactivity was present.  All supplies were disposed of appropriately.  Patient was discharged to home with radiation precautions and instructions.  Follow-up will be with repeat CBC in 3 weeks and next injection of radionuclide in approximately 4 weeks.

## 2023-03-28 ENCOUNTER — OFFICE VISIT (OUTPATIENT)
Dept: FAMILY MEDICINE CLINIC | Facility: CLINIC | Age: 73
End: 2023-03-28
Payer: MEDICARE

## 2023-03-28 VITALS
DIASTOLIC BLOOD PRESSURE: 92 MMHG | RESPIRATION RATE: 14 BRPM | SYSTOLIC BLOOD PRESSURE: 138 MMHG | HEART RATE: 60 BPM | TEMPERATURE: 97.1 F | BODY MASS INDEX: 26.77 KG/M2 | OXYGEN SATURATION: 99 % | HEIGHT: 73 IN | WEIGHT: 202 LBS

## 2023-03-28 DIAGNOSIS — I10 PRIMARY HYPERTENSION: ICD-10-CM

## 2023-03-28 DIAGNOSIS — J30.1 SEASONAL ALLERGIC RHINITIS DUE TO POLLEN: ICD-10-CM

## 2023-03-28 DIAGNOSIS — Z00.00 MEDICARE ANNUAL WELLNESS VISIT, SUBSEQUENT: Primary | ICD-10-CM

## 2023-03-28 DIAGNOSIS — C61 PROSTATE CANCER: ICD-10-CM

## 2023-03-28 DIAGNOSIS — C79.51 BONE METASTASES: ICD-10-CM

## 2023-03-28 NOTE — PROGRESS NOTES
Subjective   Madi Blanco is a 73 y.o. male    Annual physical exam  Annual Medicare wellness subsequent  Prostate cancer  Bone metastases  Hypertension    History of Present Illness  The patient presents today for his annual physical examination and Medicare wellness visit. He is currently on chemotherapy for metastatic prostate cancer. His blood pressure is noted to be somewhat elevated today at 138/92 mmHg. His last several readings have shown elevated systolic pressures, but normal diastolic pressures.    He reports his cold or allergies are gone. The patient would like to know if he can take Allegra-D or Delaney-Sanford Plus. He is still on radiation infusions and Lupron. The patient said he was given another injection for bone strengthener. He has not been checking his blood pressure at home, but he checked it approximately 1 week ago and it was approximately 150 mmHg and the lowest he could get was 139 mmHg. The patient said he felt great last Wednesday and had the injection. He said his rib pain did get better after the injection.    The following portions of the patient's history were reviewed and updated as appropriate: allergies, current medications, past social history and problem list    Review of Systems   Constitutional: Negative.  Negative for chills, fatigue, fever and unexpected weight change.   HENT: Positive for congestion, postnasal drip, rhinorrhea, sneezing and sore throat. Negative for ear pain, hearing loss, sinus pressure and trouble swallowing.    Eyes: Positive for itching.   Respiratory: Negative.  Negative for cough, chest tightness and shortness of breath.    Cardiovascular: Negative.  Negative for chest pain, palpitations and leg swelling.   Gastrointestinal: Negative.  Negative for nausea.   Endocrine: Negative.    Genitourinary: Negative.    Musculoskeletal: Negative.    Skin: Negative.  Negative for color change and rash.   Allergic/Immunologic: Negative.    Neurological: Negative.   Negative for dizziness, syncope, weakness and headaches.   Hematological: Negative.    Psychiatric/Behavioral: Negative.    All other systems reviewed and are negative.      Objective     Vitals:    03/28/23 1031   BP: 138/92   Pulse: 60   Resp: 14   Temp: 97.1 °F (36.2 °C)   SpO2: 99%       Physical Exam  Vitals and nursing note reviewed.   Constitutional:       General: He is not in acute distress.     Appearance: Normal appearance. He is well-developed. He is not ill-appearing, toxic-appearing or diaphoretic.   HENT:      Head: Normocephalic and atraumatic.      Right Ear: External ear normal.      Left Ear: External ear normal.      Nose: Nose normal.   Eyes:      Conjunctiva/sclera: Conjunctivae normal.      Pupils: Pupils are equal, round, and reactive to light.   Neck:      Thyroid: No thyromegaly.      Vascular: No carotid bruit or JVD.   Cardiovascular:      Rate and Rhythm: Normal rate and regular rhythm.      Pulses: Normal pulses.      Heart sounds: Normal heart sounds. No murmur heard.  Pulmonary:      Effort: Pulmonary effort is normal. No respiratory distress.      Breath sounds: Normal breath sounds.   Abdominal:      General: Bowel sounds are normal.      Palpations: Abdomen is soft. There is no mass.      Tenderness: There is no abdominal tenderness.   Musculoskeletal:         General: No swelling. Normal range of motion.      Cervical back: Normal range of motion and neck supple.   Lymphadenopathy:      Cervical: No cervical adenopathy.   Skin:     General: Skin is warm and dry.      Findings: No lesion or rash.   Neurological:      Mental Status: He is alert and oriented to person, place, and time.      Cranial Nerves: No cranial nerve deficit.      Sensory: No sensory deficit.      Motor: No weakness.      Coordination: Coordination normal.      Gait: Gait normal.      Deep Tendon Reflexes: Reflexes are normal and symmetric.   Psychiatric:         Mood and Affect: Mood normal.         Behavior:  Behavior normal.         Thought Content: Thought content normal.         Judgment: Judgment normal.         Assessment & Plan     Problems Addressed this Visit        Hematology and Neoplasia    Prostate cancer (HCC)    Bone metastases (HCC)   Other Visit Diagnoses     Medicare annual wellness visit, subsequent    -  Primary    Primary hypertension        Seasonal allergic rhinitis due to pollen          Diagnoses       Codes Comments    Medicare annual wellness visit, subsequent    -  Primary ICD-10-CM: Z00.00  ICD-9-CM: V70.0     Primary hypertension     ICD-10-CM: I10  ICD-9-CM: 401.9     Prostate cancer (HCC)     ICD-10-CM: C61  ICD-9-CM: 185     Bone metastases     ICD-10-CM: C79.51  ICD-9-CM: 198.5     Seasonal allergic rhinitis due to pollen     ICD-10-CM: J30.1  ICD-9-CM: 477.0         Preventive medicine discussed, diet, exercise, healthy living discussed at length.  Discussed nutrition, physical activity, healthy weight, injury prevention, misuse of tobacco, alcohol and drugs, dental health, mental health, immunizations, screening    Part of this note may be an electronic transcription/translation of spoken language to printed text using the Dragon Dictation System.             Transcribed from ambient dictation for CLAIRE Bryan MD by Lucrecia Carvalho.  03/28/23   12:09 EDT  Patient or patient representative verbalized consent to the visit recording.  I have personally performed the services described in this document as transcribed by the above individual, and it is both accurate and complete.

## 2023-04-13 ENCOUNTER — CLINICAL SUPPORT (OUTPATIENT)
Dept: FAMILY MEDICINE CLINIC | Facility: CLINIC | Age: 73
End: 2023-04-13
Payer: MEDICARE

## 2023-04-13 DIAGNOSIS — C79.51 MALIGNANT NEOPLASM METASTATIC TO BONE: ICD-10-CM

## 2023-04-13 DIAGNOSIS — C61 PROSTATE CANCER: ICD-10-CM

## 2023-04-13 PROCEDURE — 36415 COLL VENOUS BLD VENIPUNCTURE: CPT | Performed by: INTERNAL MEDICINE

## 2023-04-13 NOTE — PROGRESS NOTES
Venipuncture Blood Specimen Collection  Venipuncture performed in right arm by Doris Velazquez MA with good hemostasis. Patient tolerated the procedure well without complications.   04/13/23   Doris Velazquez MA

## 2023-04-14 DIAGNOSIS — C61 PROSTATE CANCER: Primary | ICD-10-CM

## 2023-04-14 LAB
ALBUMIN SERPL-MCNC: 4.2 G/DL (ref 3.7–4.7)
ALBUMIN/GLOB SERPL: 1.8 {RATIO} (ref 1.2–2.2)
ALP SERPL-CCNC: 82 IU/L (ref 44–121)
ALT SERPL-CCNC: 20 IU/L (ref 0–44)
AMBIG ABBREV CMP14 DEFAULT: NORMAL
AST SERPL-CCNC: 21 IU/L (ref 0–40)
BASOPHILS # BLD AUTO: 0 X10E3/UL (ref 0–0.2)
BASOPHILS NFR BLD AUTO: 2 %
BILIRUB SERPL-MCNC: 0.3 MG/DL (ref 0–1.2)
BUN SERPL-MCNC: 10 MG/DL (ref 8–27)
BUN/CREAT SERPL: 10 (ref 10–24)
CALCIUM SERPL-MCNC: 9 MG/DL (ref 8.6–10.2)
CHLORIDE SERPL-SCNC: 104 MMOL/L (ref 96–106)
CO2 SERPL-SCNC: 25 MMOL/L (ref 20–29)
CREAT SERPL-MCNC: 0.96 MG/DL (ref 0.76–1.27)
EGFRCR SERPLBLD CKD-EPI 2021: 85 ML/MIN/1.73
EOSINOPHIL # BLD AUTO: 0.1 X10E3/UL (ref 0–0.4)
EOSINOPHIL NFR BLD AUTO: 7 %
ERYTHROCYTE [DISTWIDTH] IN BLOOD BY AUTOMATED COUNT: 14 % (ref 11.6–15.4)
GLOBULIN SER CALC-MCNC: 2.3 G/DL (ref 1.5–4.5)
GLUCOSE SERPL-MCNC: 106 MG/DL (ref 70–99)
HCT VFR BLD AUTO: 41.5 % (ref 37.5–51)
HGB BLD-MCNC: 14.6 G/DL (ref 13–17.7)
IMM GRANULOCYTES # BLD AUTO: 0 X10E3/UL (ref 0–0.1)
IMM GRANULOCYTES NFR BLD AUTO: 1 %
LYMPHOCYTES # BLD AUTO: 0.5 X10E3/UL (ref 0.7–3.1)
LYMPHOCYTES NFR BLD AUTO: 24 %
MAGNESIUM SERPL-MCNC: 2.2 MG/DL (ref 1.6–2.3)
MCH RBC QN AUTO: 33 PG (ref 26.6–33)
MCHC RBC AUTO-ENTMCNC: 35.2 G/DL (ref 31.5–35.7)
MCV RBC AUTO: 94 FL (ref 79–97)
MONOCYTES # BLD AUTO: 0.2 X10E3/UL (ref 0.1–0.9)
MONOCYTES NFR BLD AUTO: 11 %
NEUTROPHILS # BLD AUTO: 1.1 X10E3/UL (ref 1.4–7)
NEUTROPHILS NFR BLD AUTO: 55 %
PHOSPHATE SERPL-MCNC: 3.3 MG/DL (ref 2.8–4.1)
PLATELET # BLD AUTO: 139 X10E3/UL (ref 150–450)
POTASSIUM SERPL-SCNC: 4.2 MMOL/L (ref 3.5–5.2)
PROT SERPL-MCNC: 6.5 G/DL (ref 6–8.5)
PSA SERPL-MCNC: 22 NG/ML (ref 0–4)
RBC # BLD AUTO: 4.42 X10E6/UL (ref 4.14–5.8)
SODIUM SERPL-SCNC: 139 MMOL/L (ref 134–144)
WBC # BLD AUTO: 2 X10E3/UL (ref 3.4–10.8)

## 2023-04-20 ENCOUNTER — CLINICAL SUPPORT (OUTPATIENT)
Dept: RADIATION ONCOLOGY | Facility: HOSPITAL | Age: 73
End: 2023-04-20
Payer: MEDICARE

## 2023-04-20 ENCOUNTER — HOSPITAL ENCOUNTER (OUTPATIENT)
Dept: RADIATION ONCOLOGY | Facility: HOSPITAL | Age: 73
Setting detail: RADIATION/ONCOLOGY SERIES
Discharge: HOME | End: 2023-04-20
Payer: MEDICARE

## 2023-04-20 VITALS
SYSTOLIC BLOOD PRESSURE: 140 MMHG | WEIGHT: 199.4 LBS | BODY MASS INDEX: 26.31 KG/M2 | DIASTOLIC BLOOD PRESSURE: 72 MMHG | RESPIRATION RATE: 20 BRPM | TEMPERATURE: 97 F | HEART RATE: 63 BPM | OXYGEN SATURATION: 97 %

## 2023-04-20 DIAGNOSIS — C79.51 MALIGNANT NEOPLASM METASTATIC TO BONE: Primary | ICD-10-CM

## 2023-04-20 PROCEDURE — A9606 RADIUM RA223 DICHLORIDE THER: HCPCS | Performed by: RADIOLOGY

## 2023-04-20 PROCEDURE — 79101 NUCLEAR RX IV ADMIN: CPT | Performed by: RADIOLOGY

## 2023-04-20 PROCEDURE — G0463 HOSPITAL OUTPT CLINIC VISIT: HCPCS

## 2023-04-20 PROCEDURE — 77300 RADIATION THERAPY DOSE PLAN: CPT | Performed by: RADIOLOGY

## 2023-04-20 NOTE — PROGRESS NOTES
SPECIAL TREATMENT PROCEDURE NOTE  INFUSION OF RADIONUCLIDE RADIUM-223    PATIENT:                                                      Madi Blanco  MEDICAL RECORD #:                        [id number]  :                                                          1950  DATE OF PROCEDURE:                       2023   DIAGNOSIS:     Bone Metastases  STAGE:     IV (M1)    PROCEDURE:  Injection of Radium-223 dichloride (Xofigo) 2 of 6 planned.    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  Radionuclide therapy is indicated for palliation.  Current pain 0/10.  He has been fairly active and has no limitations.  He did not experience a flare of pain after his first radionuclide injection.  Informed consent was obtained and is documented in the chart.    LABORATORY:  Pre-injection CBC from 2023  Lab Results   Component Value Date    WBC 2.0 (L) 2023    HGB 14.6 2023    HCT 41.5 2023    MCV 94 2023     (L) 2023     showed adequate values for radionuclide therapy.     PSA 22 ng/ml  This SmartLink has not been configured with any valid records.     Lab Results   Component Value Date    ALKPHOS 82 2023       PROCEDURE DESCRIPTION:  Radium-223 (Xofigo) was prescribed at 1.49 µCi/Kg.  We received a dose of 140 µCi in 8 mL.  Special dosimetry assay performed at [facility] confirmed 139 µCi dosing.  After adequate hydration, the entire volume was manually infused through slow intravenous push over 2 minutes with subsequent IV hydration to flush the line.  Postinfusion assay showed residual 0 µCi resulting in total infusion activity 139 µCi.    COMPLICATIONS:  None.  The patient tolerated the procedure well with no apparent side effects.    DISPOSITION:   Our physicist surveyed the room and no residual radioactivity was present.  All  supplies were disposed of appropriately.  Patient was discharged to home with radiation precautions and instructions.  Follow-up will be with repeat CBC in 3 weeks and next injection of radionuclide in approximately 4 weeks.

## 2023-05-11 ENCOUNTER — CLINICAL SUPPORT (OUTPATIENT)
Dept: FAMILY MEDICINE CLINIC | Facility: CLINIC | Age: 73
End: 2023-05-11
Payer: MEDICARE

## 2023-05-11 DIAGNOSIS — C79.51 MALIGNANT NEOPLASM METASTATIC TO BONE: ICD-10-CM

## 2023-05-11 DIAGNOSIS — C61 PROSTATE CANCER: ICD-10-CM

## 2023-05-11 PROCEDURE — 36415 COLL VENOUS BLD VENIPUNCTURE: CPT | Performed by: NURSE PRACTITIONER

## 2023-05-11 NOTE — PROGRESS NOTES
Pt presents in office for lab draw only.       Venipuncture Blood Specimen Collection  Venipuncture performed in right arm by Nikole Villalobos MA with good hemostasis. Patient tolerated the procedure well without complications.   05/11/23   Nikole Villalobos MA

## 2023-05-12 ENCOUNTER — TELEPHONE (OUTPATIENT)
Dept: UROLOGY | Facility: CLINIC | Age: 73
End: 2023-05-12
Payer: MEDICARE

## 2023-05-12 LAB
ALBUMIN SERPL-MCNC: 4.3 G/DL (ref 3.7–4.7)
ALBUMIN/GLOB SERPL: 2 {RATIO} (ref 1.2–2.2)
ALP SERPL-CCNC: 79 IU/L (ref 44–121)
ALT SERPL-CCNC: 19 IU/L (ref 0–44)
AST SERPL-CCNC: 21 IU/L (ref 0–40)
BASOPHILS # BLD AUTO: 0 X10E3/UL (ref 0–0.2)
BASOPHILS NFR BLD AUTO: 1 %
BILIRUB SERPL-MCNC: 0.2 MG/DL (ref 0–1.2)
BUN SERPL-MCNC: 16 MG/DL (ref 8–27)
BUN/CREAT SERPL: 16 (ref 10–24)
CALCIUM SERPL-MCNC: 9.3 MG/DL (ref 8.6–10.2)
CHLORIDE SERPL-SCNC: 102 MMOL/L (ref 96–106)
CO2 SERPL-SCNC: 24 MMOL/L (ref 20–29)
CREAT SERPL-MCNC: 0.97 MG/DL (ref 0.76–1.27)
EGFRCR SERPLBLD CKD-EPI 2021: 83 ML/MIN/1.73
EOSINOPHIL # BLD AUTO: 0.2 X10E3/UL (ref 0–0.4)
EOSINOPHIL NFR BLD AUTO: 7 %
ERYTHROCYTE [DISTWIDTH] IN BLOOD BY AUTOMATED COUNT: 14.6 % (ref 11.6–15.4)
GLOBULIN SER CALC-MCNC: 2.2 G/DL (ref 1.5–4.5)
GLUCOSE SERPL-MCNC: 86 MG/DL (ref 70–99)
HCT VFR BLD AUTO: 40.5 % (ref 37.5–51)
HGB BLD-MCNC: 13.7 G/DL (ref 13–17.7)
IMM GRANULOCYTES # BLD AUTO: 0 X10E3/UL (ref 0–0.1)
IMM GRANULOCYTES NFR BLD AUTO: 1 %
LYMPHOCYTES # BLD AUTO: 0.4 X10E3/UL (ref 0.7–3.1)
LYMPHOCYTES NFR BLD AUTO: 20 %
MCH RBC QN AUTO: 32.7 PG (ref 26.6–33)
MCHC RBC AUTO-ENTMCNC: 33.8 G/DL (ref 31.5–35.7)
MCV RBC AUTO: 97 FL (ref 79–97)
MONOCYTES # BLD AUTO: 0.3 X10E3/UL (ref 0.1–0.9)
MONOCYTES NFR BLD AUTO: 16 %
NEUTROPHILS # BLD AUTO: 1.1 X10E3/UL (ref 1.4–7)
NEUTROPHILS NFR BLD AUTO: 55 %
PLATELET # BLD AUTO: 141 X10E3/UL (ref 150–450)
POTASSIUM SERPL-SCNC: 4.1 MMOL/L (ref 3.5–5.2)
PROT SERPL-MCNC: 6.5 G/DL (ref 6–8.5)
PSA SERPL-MCNC: 21.3 NG/ML (ref 0–4)
RBC # BLD AUTO: 4.19 X10E6/UL (ref 4.14–5.8)
SODIUM SERPL-SCNC: 140 MMOL/L (ref 134–144)
WBC # BLD AUTO: 2 X10E3/UL (ref 3.4–10.8)

## 2023-05-12 NOTE — TELEPHONE ENCOUNTER
Left message that PT's appointment on July 10th, 2023 at 10:30am had been rescheduled to July 11th, 2023 at 11:10am.

## 2023-05-17 ENCOUNTER — HOSPITAL ENCOUNTER (OUTPATIENT)
Dept: ONCOLOGY | Facility: HOSPITAL | Age: 73
Discharge: HOME | End: 2023-05-17
Admitting: INTERNAL MEDICINE
Payer: MEDICARE

## 2023-05-17 ENCOUNTER — OFFICE VISIT (OUTPATIENT)
Dept: ONCOLOGY | Facility: CLINIC | Age: 73
End: 2023-05-17
Payer: MEDICARE

## 2023-05-17 ENCOUNTER — SPECIALTY PHARMACY (OUTPATIENT)
Dept: ONCOLOGY | Facility: HOSPITAL | Age: 73
End: 2023-05-17
Payer: MEDICARE

## 2023-05-17 VITALS
HEIGHT: 73 IN | WEIGHT: 197 LBS | BODY MASS INDEX: 26.11 KG/M2 | DIASTOLIC BLOOD PRESSURE: 71 MMHG | SYSTOLIC BLOOD PRESSURE: 144 MMHG | TEMPERATURE: 97.1 F | OXYGEN SATURATION: 99 % | RESPIRATION RATE: 18 BRPM | HEART RATE: 64 BPM

## 2023-05-17 DIAGNOSIS — C61 PROSTATE CANCER: ICD-10-CM

## 2023-05-17 DIAGNOSIS — C79.51 MALIGNANT NEOPLASM METASTATIC TO BONE: Primary | ICD-10-CM

## 2023-05-17 LAB
CREAT BLDA-MCNC: 1 MG/DL (ref 0.6–1.3)
MAGNESIUM SERPL-MCNC: 2.1 MG/DL (ref 1.6–2.4)
PHOSPHATE SERPL-MCNC: 4 MG/DL (ref 2.5–4.5)

## 2023-05-17 PROCEDURE — 96402 CHEMO HORMON ANTINEOPL SQ/IM: CPT

## 2023-05-17 PROCEDURE — 1126F AMNT PAIN NOTED NONE PRSNT: CPT | Performed by: INTERNAL MEDICINE

## 2023-05-17 PROCEDURE — 96365 THER/PROPH/DIAG IV INF INIT: CPT

## 2023-05-17 PROCEDURE — 25010000002 LEUPROLIDE 22.5 MG KIT: Performed by: INTERNAL MEDICINE

## 2023-05-17 PROCEDURE — 99214 OFFICE O/P EST MOD 30 MIN: CPT | Performed by: INTERNAL MEDICINE

## 2023-05-17 PROCEDURE — 96374 THER/PROPH/DIAG INJ IV PUSH: CPT

## 2023-05-17 PROCEDURE — 83735 ASSAY OF MAGNESIUM: CPT | Performed by: INTERNAL MEDICINE

## 2023-05-17 PROCEDURE — 82565 ASSAY OF CREATININE: CPT

## 2023-05-17 PROCEDURE — 25010000002 ZOLEDRONIC ACID PER 1 MG: Performed by: INTERNAL MEDICINE

## 2023-05-17 PROCEDURE — 84100 ASSAY OF PHOSPHORUS: CPT | Performed by: INTERNAL MEDICINE

## 2023-05-17 RX ORDER — SODIUM CHLORIDE 9 MG/ML
250 INJECTION, SOLUTION INTRAVENOUS ONCE
OUTPATIENT
Start: 2023-08-09

## 2023-05-17 RX ORDER — SODIUM CHLORIDE 9 MG/ML
250 INJECTION, SOLUTION INTRAVENOUS ONCE
Status: COMPLETED | OUTPATIENT
Start: 2023-05-17 | End: 2023-05-17

## 2023-05-17 RX ADMIN — SODIUM CHLORIDE 250 ML: 9 INJECTION, SOLUTION INTRAVENOUS at 15:10

## 2023-05-17 RX ADMIN — LEUPROLIDE ACETATE 22.5 MG: KIT SUBCUTANEOUS at 14:59

## 2023-05-17 RX ADMIN — ZOLEDRONIC ACID 4 MG: 4 INJECTION, SOLUTION, CONCENTRATE INTRAVENOUS at 15:10

## 2023-05-18 ENCOUNTER — HOSPITAL ENCOUNTER (OUTPATIENT)
Dept: RADIATION ONCOLOGY | Facility: HOSPITAL | Age: 73
Setting detail: RADIATION/ONCOLOGY SERIES
Discharge: HOME | End: 2023-05-18
Payer: MEDICARE

## 2023-05-18 ENCOUNTER — CLINICAL SUPPORT (OUTPATIENT)
Dept: RADIATION ONCOLOGY | Facility: HOSPITAL | Age: 73
End: 2023-05-18
Payer: MEDICARE

## 2023-05-18 VITALS
BODY MASS INDEX: 26.06 KG/M2 | DIASTOLIC BLOOD PRESSURE: 79 MMHG | RESPIRATION RATE: 20 BRPM | OXYGEN SATURATION: 98 % | TEMPERATURE: 97.5 F | SYSTOLIC BLOOD PRESSURE: 165 MMHG | HEART RATE: 81 BPM | WEIGHT: 197.5 LBS

## 2023-05-18 DIAGNOSIS — C61 PROSTATE CANCER: ICD-10-CM

## 2023-05-18 DIAGNOSIS — C79.51 MALIGNANT NEOPLASM METASTATIC TO BONE: Primary | ICD-10-CM

## 2023-05-18 PROCEDURE — G0463 HOSPITAL OUTPT CLINIC VISIT: HCPCS

## 2023-05-18 PROCEDURE — 79101 NUCLEAR RX IV ADMIN: CPT | Performed by: RADIOLOGY

## 2023-05-18 PROCEDURE — 77300 RADIATION THERAPY DOSE PLAN: CPT | Performed by: RADIOLOGY

## 2023-05-18 PROCEDURE — A9606 RADIUM RA223 DICHLORIDE THER: HCPCS | Performed by: RADIOLOGY

## 2023-05-18 NOTE — PROGRESS NOTES
SPECIAL TREATMENT PROCEDURE NOTE  INFUSION OF RADIONUCLIDE RADIUM-223    PATIENT:                                                      Madi Blanco  MEDICAL RECORD #:                        [id number]  :                                                          1950  DATE OF PROCEDURE:                       2023   DIAGNOSIS:     Bone Metastases  STAGE:     IV (M1)    PROCEDURE:  Injection of Radium-223 dichloride (Xofigo) 3 of 6 planned.    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  Radionuclide therapy is indicated for palliation.  Current pain 0/10.  The inherent complexity of this case, based on multi-modality coordination of treatment and the interdisciplinary coordination of care for this patient justifies a special treatment procedure.  Informed consent was obtained and is documented in the chart.    LABORATORY:  Pre-injection CBC from   Lab Results   Component Value Date    WBC 2.0 (L) 2023    HGB 13.7 2023    HCT 40.5 2023    MCV 97 2023     (L) 2023     showed adequate values for radionuclide therapy.     PSA 21.3 ng/ml  This SmartLink has not been configured with any valid records.     Lab Results   Component Value Date    ALKPHOS 79 2023    ALKPHOS 129 (H) 2023   .      PROCEDURE DESCRIPTION:  Radium-223 (Xofigo) was prescribed at 1.49 µCi/Kg.  We received a dose of 138.43 µCi in 5.36 mL.  Special dosimetry assay performed at [facility] confirmed 137 µCi dosing.  After adequate hydration, the entire volume was manually infused through slow intravenous push over 2 minutes with subsequent IV hydration to flush the line.  Postinfusion assay showed residual 4 µCi resulting in total infusion activity 133 µCi.    COMPLICATIONS:  None.  The patient tolerated the procedure well with no apparent side effects.    DISPOSITION:   Our  physicist surveyed the room and no residual radioactivity was present.  All supplies were disposed of appropriately.  Patient was discharged to home with radiation precautions and instructions.  Follow-up will be with repeat CBC in 3 weeks and next injection of radionuclide in approximately 4 weeks.

## 2023-06-08 ENCOUNTER — CLINICAL SUPPORT (OUTPATIENT)
Dept: FAMILY MEDICINE CLINIC | Facility: CLINIC | Age: 73
End: 2023-06-08
Payer: MEDICARE

## 2023-06-08 DIAGNOSIS — C61 PROSTATE CANCER: ICD-10-CM

## 2023-06-08 DIAGNOSIS — C79.51 MALIGNANT NEOPLASM METASTATIC TO BONE: ICD-10-CM

## 2023-06-08 PROCEDURE — 36415 COLL VENOUS BLD VENIPUNCTURE: CPT | Performed by: INTERNAL MEDICINE

## 2023-06-08 NOTE — PROGRESS NOTES
Venipuncture Blood Specimen Collection  Venipuncture performed in right arm by Doris Velazquez MA with good hemostasis. Patient tolerated the procedure well without complications.   06/08/23   Doris Velazquez MA

## 2023-06-09 LAB
ALBUMIN SERPL-MCNC: 4.2 G/DL (ref 3.7–4.7)
ALBUMIN/GLOB SERPL: 2.1 {RATIO} (ref 1.2–2.2)
ALP SERPL-CCNC: 77 IU/L (ref 44–121)
ALT SERPL-CCNC: 17 IU/L (ref 0–44)
AST SERPL-CCNC: 17 IU/L (ref 0–40)
BASOPHILS # BLD AUTO: 0 X10E3/UL (ref 0–0.2)
BASOPHILS NFR BLD AUTO: 1 %
BILIRUB SERPL-MCNC: 0.2 MG/DL (ref 0–1.2)
BUN SERPL-MCNC: 16 MG/DL (ref 8–27)
BUN/CREAT SERPL: 17 (ref 10–24)
CALCIUM SERPL-MCNC: 9.3 MG/DL (ref 8.6–10.2)
CHLORIDE SERPL-SCNC: 102 MMOL/L (ref 96–106)
CO2 SERPL-SCNC: 24 MMOL/L (ref 20–29)
CREAT SERPL-MCNC: 0.92 MG/DL (ref 0.76–1.27)
EGFRCR SERPLBLD CKD-EPI 2021: 89 ML/MIN/1.73
EOSINOPHIL # BLD AUTO: 0.2 X10E3/UL (ref 0–0.4)
EOSINOPHIL NFR BLD AUTO: 8 %
ERYTHROCYTE [DISTWIDTH] IN BLOOD BY AUTOMATED COUNT: 15.5 % (ref 11.6–15.4)
GLOBULIN SER CALC-MCNC: 2 G/DL (ref 1.5–4.5)
GLUCOSE SERPL-MCNC: 103 MG/DL (ref 70–99)
HCT VFR BLD AUTO: 40.2 % (ref 37.5–51)
HGB BLD-MCNC: 13.8 G/DL (ref 13–17.7)
IMM GRANULOCYTES # BLD AUTO: 0 X10E3/UL (ref 0–0.1)
IMM GRANULOCYTES NFR BLD AUTO: 1 %
LYMPHOCYTES # BLD AUTO: 0.3 X10E3/UL (ref 0.7–3.1)
LYMPHOCYTES NFR BLD AUTO: 14 %
MAGNESIUM SERPL-MCNC: 2.1 MG/DL (ref 1.6–2.3)
MCH RBC QN AUTO: 33.8 PG (ref 26.6–33)
MCHC RBC AUTO-ENTMCNC: 34.3 G/DL (ref 31.5–35.7)
MCV RBC AUTO: 99 FL (ref 79–97)
MONOCYTES # BLD AUTO: 0.3 X10E3/UL (ref 0.1–0.9)
MONOCYTES NFR BLD AUTO: 10 %
NEUTROPHILS # BLD AUTO: 1.6 X10E3/UL (ref 1.4–7)
NEUTROPHILS NFR BLD AUTO: 66 %
PHOSPHATE SERPL-MCNC: 3.4 MG/DL (ref 2.8–4.1)
PLATELET # BLD AUTO: 161 X10E3/UL (ref 150–450)
POTASSIUM SERPL-SCNC: 4.2 MMOL/L (ref 3.5–5.2)
PROT SERPL-MCNC: 6.2 G/DL (ref 6–8.5)
PSA SERPL-MCNC: 19.6 NG/ML (ref 0–4)
RBC # BLD AUTO: 4.08 X10E6/UL (ref 4.14–5.8)
SODIUM SERPL-SCNC: 141 MMOL/L (ref 134–144)
WBC # BLD AUTO: 2.4 X10E3/UL (ref 3.4–10.8)

## 2023-06-15 ENCOUNTER — CLINICAL SUPPORT (OUTPATIENT)
Dept: RADIATION ONCOLOGY | Facility: HOSPITAL | Age: 73
End: 2023-06-15
Payer: MEDICARE

## 2023-06-15 VITALS
SYSTOLIC BLOOD PRESSURE: 138 MMHG | OXYGEN SATURATION: 98 % | WEIGHT: 193.6 LBS | TEMPERATURE: 97.4 F | BODY MASS INDEX: 25.54 KG/M2 | HEART RATE: 67 BPM | DIASTOLIC BLOOD PRESSURE: 71 MMHG | RESPIRATION RATE: 20 BRPM

## 2023-06-15 NOTE — PROGRESS NOTES
SPECIAL TREATMENT PROCEDURE NOTE  INFUSION OF RADIONUCLIDE RADIUM-223     PATIENT:                                                             Madi Blanco  MEDICAL RECORD #:                                 [id number]  :                                                            1950  DATE OF PROCEDURE:                             2023   DIAGNOSIS:                                                   Bone Metastases  STAGE:                                                           IV (M1)     PROCEDURE:  Injection of Radium-223 dichloride (Xofigo) 4 of 6 planned.     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  Radionuclide therapy is indicated for palliation.  Current pain 0/10.  The inherent complexity of this case, based on multi-modality coordination of treatment and the interdisciplinary coordination of care for this patient justifies a special treatment procedure.  Informed consent was obtained and is documented in the chart.     WBC   Date Value Ref Range Status   2023 2.4 (L) 3.4 - 10.8 x10E3/uL Final     RBC   Date Value Ref Range Status   2023 4.08 (L) 4.14 - 5.80 x10E6/uL Final     Hemoglobin   Date Value Ref Range Status   2023 13.8 13.0 - 17.7 g/dL Final   2023 14.9 13.0 - 17.7 g/dL Final     Hematocrit   Date Value Ref Range Status   2023 40.2 37.5 - 51.0 % Final   2023 43.7 37.5 - 51.0 % Final     MCV   Date Value Ref Range Status   2023 99 (H) 79 - 97 fL Final   2023 95.6 79.0 - 97.0 fL Final     MCH   Date Value Ref Range Status   2023 33.8 (H) 26.6 - 33.0 pg Final   2023 32.6 26.6 - 33.0 pg Final     MCHC   Date Value Ref Range Status   2023 34.3 31.5 - 35.7 g/dL Final   2023 34.1 31.5 - 35.7 g/dL Final     RDW   Date Value Ref Range Status   2023 15.5 (H) 11.6 - 15.4 % Final   2023 12.7 12.3 - 15.4 %  Final     RDW-SD   Date Value Ref Range Status   02/17/2023 44.6 37.0 - 54.0 fl Final     MPV   Date Value Ref Range Status   02/17/2023 10.4 6.0 - 12.0 fL Final     Platelets   Date Value Ref Range Status   06/08/2023 161 150 - 450 x10E3/uL Final   02/17/2023 213 140 - 450 10*3/mm3 Final     Neutrophil Rel %   Date Value Ref Range Status   06/08/2023 66 Not Estab. % Final     Neutrophil %   Date Value Ref Range Status   02/17/2023 56.8 42.7 - 76.0 % Final     Lymphocyte Rel %   Date Value Ref Range Status   06/08/2023 14 Not Estab. % Final     Lymphocyte %   Date Value Ref Range Status   02/17/2023 24.6 19.6 - 45.3 % Final     Monocyte Rel %   Date Value Ref Range Status   06/08/2023 10 Not Estab. % Final     Monocyte %   Date Value Ref Range Status   02/17/2023 10.8 5.0 - 12.0 % Final     Eosinophil Rel %   Date Value Ref Range Status   06/08/2023 8 Not Estab. % Final     Eosinophil %   Date Value Ref Range Status   02/17/2023 5.4 0.3 - 6.2 % Final     Basophil Rel %   Date Value Ref Range Status   06/08/2023 1 Not Estab. % Final     Basophil %   Date Value Ref Range Status   02/17/2023 1.9 (H) 0.0 - 1.5 % Final     Immature Grans %   Date Value Ref Range Status   02/17/2023 0.5 0.0 - 0.5 % Final     Neutrophils Absolute   Date Value Ref Range Status   06/08/2023 1.6 1.4 - 7.0 x10E3/uL Final     Neutrophils, Absolute   Date Value Ref Range Status   02/17/2023 2.42 1.70 - 7.00 10*3/mm3 Final     Lymphocytes Absolute   Date Value Ref Range Status   06/08/2023 0.3 (L) 0.7 - 3.1 x10E3/uL Final     Lymphocytes, Absolute   Date Value Ref Range Status   02/17/2023 1.05 0.70 - 3.10 10*3/mm3 Final     Monocytes Absolute   Date Value Ref Range Status   06/08/2023 0.3 0.1 - 0.9 x10E3/uL Final     Monocytes, Absolute   Date Value Ref Range Status   02/17/2023 0.46 0.10 - 0.90 10*3/mm3 Final     Eosinophils Absolute   Date Value Ref Range Status   06/08/2023 0.2 0.0 - 0.4 x10E3/uL Final     Eosinophils, Absolute   Date Value  Ref Range Status   02/17/2023 0.23 0.00 - 0.40 10*3/mm3 Final     Basophils Absolute   Date Value Ref Range Status   06/08/2023 0.0 0.0 - 0.2 x10E3/uL Final     Basophils, Absolute   Date Value Ref Range Status   02/17/2023 0.08 0.00 - 0.20 10*3/mm3 Final     Immature Grans, Absolute   Date Value Ref Range Status   02/17/2023 0.02 0.00 - 0.05 10*3/mm3 Final     nRBC   Date Value Ref Range Status   02/17/2023 0.0 0.0 - 0.2 /100 WBC Final     Lab Results   Component Value Date    GLUCOSE 103 (H) 06/08/2023    BUN 16 06/08/2023    CREATININE 0.92 06/08/2023    EGFRRESULT 89 06/08/2023    EGFR 90.7 02/17/2023    BCR 17 06/08/2023    K 4.2 06/08/2023    CO2 24 06/08/2023    CALCIUM 9.3 06/08/2023    PROTENTOTREF 6.2 06/08/2023    ALBUMIN 4.2 06/08/2023    BILITOT 0.2 06/08/2023    AST 17 06/08/2023    ALT 17 06/08/2023     PSA          4/13/2023    00:00 5/11/2023    09:04 6/8/2023    12:55   PSA   PSA 22.0  21.3  19.6           PROCEDURE DESCRIPTION:  Radium-223 (Xofigo) was prescribed at 1.49 µCi/Kg.  We received a dose of 139.48 µCi in 5.08 mL.  Special dosimetry assay performed at [facility] confirmed 138 µCi dosing.  After adequate hydration, the entire volume was manually infused through slow intravenous push over 2 minutes with subsequent IV hydration to flush the line.  Postinfusion assay showed residual 0 µCi resulting in total infusion activity 138 µCi.     COMPLICATIONS:  None.  The patient tolerated the procedure well with no apparent side effects.     DISPOSITION:   Our physicist surveyed the room and no residual radioactivity was present.  All supplies were disposed of appropriately.  Patient was discharged to home with radiation precautions and instructions.  Follow-up will be with repeat CBC in 3 weeks and next injection of radionuclide in approximately 4 weeks.    Thank you for allowing me to be involved in the care of the patient. If you have any questions or concerns, please feel free to  call or contact me at your earliest convenience.     Michele Collins  Radiation Oncology

## 2023-07-13 ENCOUNTER — HOSPITAL ENCOUNTER (OUTPATIENT)
Dept: RADIATION ONCOLOGY | Facility: HOSPITAL | Age: 73
Setting detail: RADIATION/ONCOLOGY SERIES
Discharge: HOME | End: 2023-07-13
Payer: MEDICARE

## 2023-07-13 PROCEDURE — 79101 NUCLEAR RX IV ADMIN: CPT | Performed by: RADIOLOGY

## 2023-07-13 PROCEDURE — 77300 RADIATION THERAPY DOSE PLAN: CPT | Performed by: RADIOLOGY

## 2023-07-13 PROCEDURE — A9606 RADIUM RA223 DICHLORIDE THER: HCPCS | Performed by: RADIOLOGY

## 2023-08-02 DIAGNOSIS — C79.51 MALIGNANT NEOPLASM METASTATIC TO BONE: ICD-10-CM

## 2023-08-02 DIAGNOSIS — C61 PROSTATE CANCER: Primary | ICD-10-CM

## 2023-08-03 ENCOUNTER — CLINICAL SUPPORT (OUTPATIENT)
Dept: FAMILY MEDICINE CLINIC | Facility: CLINIC | Age: 73
End: 2023-08-03
Payer: MEDICARE

## 2023-08-03 DIAGNOSIS — N40.0 BENIGN PROSTATIC HYPERPLASIA WITHOUT LOWER URINARY TRACT SYMPTOMS: Primary | ICD-10-CM

## 2023-08-03 DIAGNOSIS — C79.51 MALIGNANT NEOPLASM METASTATIC TO BONE: ICD-10-CM

## 2023-08-03 DIAGNOSIS — R97.20 ELEVATED PROSTATE SPECIFIC ANTIGEN (PSA): ICD-10-CM

## 2023-08-03 DIAGNOSIS — C61 PROSTATE CANCER: ICD-10-CM

## 2023-08-03 PROCEDURE — 36415 COLL VENOUS BLD VENIPUNCTURE: CPT | Performed by: NURSE PRACTITIONER

## 2023-08-04 DIAGNOSIS — C79.51 MALIGNANT NEOPLASM METASTATIC TO BONE: Primary | ICD-10-CM

## 2023-08-04 DIAGNOSIS — C61 PROSTATE CANCER: ICD-10-CM

## 2023-08-04 LAB
ALBUMIN SERPL-MCNC: 4.4 G/DL (ref 3.8–4.8)
ALBUMIN/GLOB SERPL: 2.3 {RATIO} (ref 1.2–2.2)
ALP SERPL-CCNC: 80 IU/L (ref 44–121)
ALT SERPL-CCNC: 18 IU/L (ref 0–44)
AST SERPL-CCNC: 20 IU/L (ref 0–40)
BASOPHILS # BLD AUTO: 0 X10E3/UL (ref 0–0.2)
BASOPHILS NFR BLD AUTO: 2 %
BILIRUB SERPL-MCNC: 0.3 MG/DL (ref 0–1.2)
BUN SERPL-MCNC: 15 MG/DL (ref 8–27)
BUN/CREAT SERPL: 17 (ref 10–24)
CALCIUM SERPL-MCNC: 9.2 MG/DL (ref 8.6–10.2)
CHLORIDE SERPL-SCNC: 103 MMOL/L (ref 96–106)
CO2 SERPL-SCNC: 23 MMOL/L (ref 20–29)
CREAT SERPL-MCNC: 0.89 MG/DL (ref 0.76–1.27)
EGFRCR SERPLBLD CKD-EPI 2021: 92 ML/MIN/1.73
EOSINOPHIL # BLD AUTO: 0.2 X10E3/UL (ref 0–0.4)
EOSINOPHIL NFR BLD AUTO: 10 %
ERYTHROCYTE [DISTWIDTH] IN BLOOD BY AUTOMATED COUNT: 14.9 % (ref 11.6–15.4)
GLOBULIN SER CALC-MCNC: 1.9 G/DL (ref 1.5–4.5)
GLUCOSE SERPL-MCNC: 86 MG/DL (ref 70–99)
HCT VFR BLD AUTO: 34 % (ref 37.5–51)
HGB BLD-MCNC: 11.7 G/DL (ref 13–17.7)
IMM GRANULOCYTES # BLD AUTO: 0 X10E3/UL (ref 0–0.1)
IMM GRANULOCYTES NFR BLD AUTO: 1 %
LYMPHOCYTES # BLD AUTO: 0.3 X10E3/UL (ref 0.7–3.1)
LYMPHOCYTES NFR BLD AUTO: 13 %
MCH RBC QN AUTO: 36.4 PG (ref 26.6–33)
MCHC RBC AUTO-ENTMCNC: 34.4 G/DL (ref 31.5–35.7)
MCV RBC AUTO: 106 FL (ref 79–97)
MONOCYTES # BLD AUTO: 0.4 X10E3/UL (ref 0.1–0.9)
MONOCYTES NFR BLD AUTO: 16 %
NEUTROPHILS # BLD AUTO: 1.4 X10E3/UL (ref 1.4–7)
NEUTROPHILS NFR BLD AUTO: 58 %
PLATELET # BLD AUTO: 153 X10E3/UL (ref 150–450)
POTASSIUM SERPL-SCNC: 4.5 MMOL/L (ref 3.5–5.2)
PROT SERPL-MCNC: 6.3 G/DL (ref 6–8.5)
PSA SERPL-MCNC: 22.2 NG/ML (ref 0–4)
RBC # BLD AUTO: 3.21 X10E6/UL (ref 4.14–5.8)
SODIUM SERPL-SCNC: 138 MMOL/L (ref 134–144)
WBC # BLD AUTO: 2.5 X10E3/UL (ref 3.4–10.8)

## 2023-08-09 ENCOUNTER — HOSPITAL ENCOUNTER (OUTPATIENT)
Dept: ONCOLOGY | Facility: HOSPITAL | Age: 73
Discharge: HOME | End: 2023-08-09
Admitting: INTERNAL MEDICINE
Payer: MEDICARE

## 2023-08-09 ENCOUNTER — SPECIALTY PHARMACY (OUTPATIENT)
Dept: ONCOLOGY | Facility: HOSPITAL | Age: 73
End: 2023-08-09
Payer: MEDICARE

## 2023-08-09 ENCOUNTER — OFFICE VISIT (OUTPATIENT)
Dept: ONCOLOGY | Facility: CLINIC | Age: 73
End: 2023-08-09
Payer: MEDICARE

## 2023-08-09 VITALS
BODY MASS INDEX: 26.24 KG/M2 | TEMPERATURE: 96.9 F | RESPIRATION RATE: 18 BRPM | WEIGHT: 198 LBS | DIASTOLIC BLOOD PRESSURE: 76 MMHG | SYSTOLIC BLOOD PRESSURE: 154 MMHG | HEART RATE: 74 BPM | HEIGHT: 73 IN | OXYGEN SATURATION: 100 %

## 2023-08-09 DIAGNOSIS — C79.51 MALIGNANT NEOPLASM METASTATIC TO BONE: ICD-10-CM

## 2023-08-09 DIAGNOSIS — C61 PROSTATE CANCER: Primary | ICD-10-CM

## 2023-08-09 LAB — CREAT BLDA-MCNC: 0.9 MG/DL (ref 0.6–1.3)

## 2023-08-09 PROCEDURE — 99214 OFFICE O/P EST MOD 30 MIN: CPT | Performed by: INTERNAL MEDICINE

## 2023-08-09 PROCEDURE — 96401 CHEMO ANTI-NEOPL SQ/IM: CPT

## 2023-08-09 PROCEDURE — 1126F AMNT PAIN NOTED NONE PRSNT: CPT | Performed by: INTERNAL MEDICINE

## 2023-08-09 PROCEDURE — 96402 CHEMO HORMON ANTINEOPL SQ/IM: CPT

## 2023-08-09 PROCEDURE — 82565 ASSAY OF CREATININE: CPT

## 2023-08-09 PROCEDURE — 25010000002 ZOLEDRONIC ACID PER 1 MG: Performed by: INTERNAL MEDICINE

## 2023-08-09 PROCEDURE — 96374 THER/PROPH/DIAG INJ IV PUSH: CPT

## 2023-08-09 PROCEDURE — 25010000002 LEUPROLIDE 22.5 MG KIT: Performed by: INTERNAL MEDICINE

## 2023-08-09 RX ORDER — SODIUM CHLORIDE 9 MG/ML
250 INJECTION, SOLUTION INTRAVENOUS ONCE
OUTPATIENT
Start: 2023-11-01

## 2023-08-09 RX ORDER — ABIRATERONE 500 MG/1
1000 TABLET ORAL DAILY
Qty: 60 TABLET | Refills: 11 | Status: SHIPPED | OUTPATIENT
Start: 2023-08-13 | End: 2023-08-09 | Stop reason: SDUPTHER

## 2023-08-09 RX ORDER — PREDNISONE 5 MG/1
5 TABLET ORAL 2 TIMES DAILY
Qty: 60 TABLET | Refills: 11 | Status: SHIPPED | OUTPATIENT
Start: 2023-08-09

## 2023-08-09 RX ORDER — ABIRATERONE ACETATE 250 MG/1
1000 TABLET ORAL DAILY
Qty: 120 TABLET | Refills: 11 | Status: SHIPPED | OUTPATIENT
Start: 2023-08-13 | End: 2023-08-10 | Stop reason: SDUPTHER

## 2023-08-09 RX ADMIN — LEUPROLIDE ACETATE 22.5 MG: KIT SUBCUTANEOUS at 12:24

## 2023-08-09 RX ADMIN — ZOLEDRONIC ACID 4 MG: 4 INJECTION, SOLUTION, CONCENTRATE INTRAVENOUS at 12:29

## 2023-08-09 NOTE — PROGRESS NOTES
PROBLEM LIST:  Oncology/Hematology History   Prostate cancer   11/22/2021 Initial Diagnosis    Prostate cancer (HCC)     11/22/2021 Cancer Staged      Staging form: Prostate, AJCC 8th Edition  - Clinical stage from 11/22/2021: Stage IIIA (cT2c, cN0, cM0, PSA: 53.4, Grade Group: 3) - Signed by Remigio Haines MD on 1/17/2022 11/23/2021 Biopsy         2/16/2022 -  Hormonal Therapy    Lupron 45 mg Q 6M on  2/16/22 and 8/17/22  Starts Lupron 22.5 mg Q3M on 2/22/23     3/9/2022 - 4/18/2022 Radiation    Radiation OncologyTreatment Course:  Madi Blanco received 7000 cGy in 28 fractions to prostate gland and nodes via External Beam Radiation - EBRT.     11/25/2022 Progression    Lab Results   Component Value Date    PSA 35.100 (H) 01/04/2023    PSA 10.5 (H) 11/25/2022    PSA 2.7 08/23/2022    PSA 53.4 (H) 11/11/2021    PSA 48.400 (H) 10/22/2021          12/31/2022 Imaging    NM PET/CT Skull Base to Mid Thigh    Result Date: 12/31/2022  Widespread bony metastatic disease as described, much of which is not apparent on CT scan. No evidence of solid organ disease or hypermetabolic adenopathy.  This report was finalized on 12/31/2022 9:15 AM by Dr. Antonio Rosenthal MD.           1/18/2023 -  Hormonal Therapy    Apalutamide      2/22/2023 -  Treatment    Zometa Q12W for Bone Metastasis     8/14/2023 -  Chemotherapy    OP PROSTATE Abiraterone / PredniSONE     Malignant neoplasm metastatic to bone   1/4/2023 Initial Diagnosis    Bone metastases (HCC)     8/14/2023 -  Chemotherapy    OP PROSTATE Abiraterone / PredniSONE         REASON FOR VISIT: Metastatic Prostate Cancer    HISTORY OF PRESENT ILLNESS:   73 y.o.  male presents today for follow-up of his prostate cancer.  He is currently on apalutamide with Lupron along with Zometa.  Tolerating it well with minimal side effects.  He is also undergoing treatment with Xofigo for now.  Denies any issues with nausea or vomiting.  Is becoming fairly fatigued.  His strength is also  "worsened slightly.    Past medical history, social history and family history was reviewed 08/09/23 and unchanged from prior visit.    Review of Systems:    Review of Systems   Constitutional: Negative.  Positive for fatigue.   HENT:  Negative.     Eyes: Negative.    Respiratory: Negative.     Cardiovascular: Negative.    Gastrointestinal: Negative.    Endocrine: Negative.    Genitourinary: Negative.     Musculoskeletal:  Positive for back pain.   Skin: Negative.    Neurological: Negative.    Hematological: Negative.    Psychiatric/Behavioral: Negative.            Medications:        Current Outpatient Medications:     amLODIPine-benazepril (LOTREL) 5-40 MG per capsule, Take 1 capsule by mouth Daily., Disp: 90 capsule, Rfl: 1    tamsulosin (FLOMAX) 0.4 MG capsule 24 hr capsule, Take 1 capsule by mouth Every Night., Disp: 90 capsule, Rfl: 3  No current facility-administered medications for this visit.    Facility-Administered Medications Ordered in Other Visits:     zoledronic acid (ZOMETA) 4 mg in sodium chloride 0.9 % 100 mL IVPB, 4 mg, Intravenous, Once, Deja Roa MD           ALLERGIES:    Allergies   Allergen Reactions    Penicillins Hives         Physical Exam    VITAL SIGNS:  /76   Pulse 74   Temp 96.9 øF (36.1 øC) (Infrared)   Resp 18   Ht 185.4 cm (72.99\")   Wt 89.8 kg (198 lb)   SpO2 100%   BMI 26.13 kg/mý     ECOG score: 0           Wt Readings from Last 3 Encounters:   08/09/23 89.8 kg (198 lb)   07/13/23 88.5 kg (195 lb 3.2 oz)   07/11/23 87.5 kg (193 lb)       Body mass index is 26.13 kg/mý. Body surface area is 2.14 meters squared.       Performance Status: 0    General: well appearing, in no acute distress  HEENT: sclera anicteric, neck is supple  Lymphatics: no cervical, supraclavicular, or axillary adenopathy  Cardiovascular: regular rate and rhythm, no murmurs, rubs or gallops  Lungs: clear to auscultation bilaterally  Abdomen: soft, nontender, nondistended.  No palpable " organomegaly  Extremities: no lower extremity edema  Skin: no rashes, lesions, bruising, or petechiae  Msk:  Shows no weakness of the large muscle groups  Psych: Mood is stable        RECENT LABS:    Lab Results   Component Value Date    HGB 11.7 (L) 08/03/2023    HCT 34.0 (L) 08/03/2023     (H) 08/03/2023     08/03/2023    WBC 2.5 (L) 08/03/2023    NEUTROABS 1.4 08/03/2023    LYMPHSABS 0.3 (L) 08/03/2023    MONOSABS 0.4 08/03/2023    EOSABS 0.2 08/03/2023    BASOSABS 0.0 08/03/2023       Lab Results   Component Value Date    GLUCOSE 86 08/03/2023    BUN 15 08/03/2023    CREATININE 0.90 08/09/2023     08/03/2023    K 4.5 08/03/2023     08/03/2023    CO2 23 08/03/2023    CALCIUM 9.2 08/03/2023    PROTEINTOT 7.1 02/17/2023    ALBUMIN 4.4 08/03/2023    BILITOT 0.3 08/03/2023    ALKPHOS 80 08/03/2023    AST 20 08/03/2023    ALT 18 08/03/2023     Lab Results   Component Value Date    PSA 22.2 (H) 08/03/2023    PSA 20.1 (H) 07/10/2023    PSA 19.6 (H) 06/08/2023    PSA 21.3 (H) 05/11/2023    PSA 22.0 (H) 04/13/2023         NM PET/CT Skull Base to Mid Thigh    Result Date: 12/31/2022  Widespread bony metastatic disease as described, much of which is not apparent on CT scan. No evidence of solid organ disease or hypermetabolic adenopathy.  This report was finalized on 12/31/2022 9:15 AM by Dr. Antonio Rosenthal MD.            Assessment/Plan    1.  Metastatic prostate cancer.  On apalutamide he has not had adequate response to his treatments.  I feel that this is really not working well for him.  I am going to switch his treatment to Zytiga.  I discussed other options including chemotherapy at this time.  I am going to send his tissue for [employer] sequencing to see if there is any targetable mutations such as a BRCA mutation.  We discussed side effects to be expected.        2.  Pain secondary to bone metastases.  Dr. Haines is treating him with Xofigo for now.  He has 2 more treatments to go.    3.  Bone  metastases.  Patient currently on Zometa        Deja Roa MD  [facility]    Return on: 11/01/23  Return in (Approximately): 3 months, Schedule with next infusion    Orders Placed This Encounter   Procedures    Comprehensive metabolic panel    Magnesium    Phosphorus    PSA    Testosterone, Free, Total    CBC and Differential       8/9/2023

## 2023-08-09 NOTE — PROGRESS NOTES
Oral Chemotherapy - New Referral    Received a referral from Dr. Roa    Treatment Plan: Zytiga (abiraterone acetate) + prednisone + Eligard (SQ leuprolide)  Start date of treatment planned for: As soon as oral specialty medication is available.  Indication: metastatic castration resistant prostate cancer  Relevant past treatments: radiation therapy + every 6 month leuprolide, apalutamide with every 3 month leuprolide  Is the therapy appropriate based on treatment guidelines and FDA labeling?: yes  Therapeutic Goals: Continue treatment until progression or intolerable toxicity  Patient can self-administer oral medications: Yes    Drug-Drug Interactions: The current medication list was reviewed and there are some relevant drug-drug interactions with the oral specialty medication that will be discussed during education, including:  Zytiga may increase concentrations of Flomax. Will advise patient to monitor blood pressure / signs of hypotension or orthostasis (especially given antihypertensive therapy with Lotrel).  Medication Allergies: The patient has no relevant allergies as it relates to their oral specialty medication  Review of Labs/Dose Adjustments: The patient's most recent labs were reviewed and all are WNL to start treatment at this dose.   Patient continues on Zometa + Xofigo for symptomatic bone metastases.     A prescription was released to [facility] for   Drug: Zytiga (abiraterone acetate)  Strength: 500 mg  Directions: Take 2 tablets by mouth daily on an empty stomach.  Quantity: 60  Refills: 11    Pharmacy education is not yet scheduled, but consent will be signed at that time. CCA signed previously.    Ann Cowden Mayer, PharmD, [facility]  Oncology Clinical Pharmacist  Phone [phone]    8/9/2023  13:17 EDT    Addendum  Note patient's insurance will only cover the 250 mg tablets. Rx modified as follows:    A prescription was released to [facility] for   Drug: Zytiga (abiraterone acetate)  Strength: 250 mg  Directions: Take 4 tablets by mouth daily on an empty stomach (1 hour before meals, or 2 hours after meals).  Quantity: 120  Refills: 11    Ann Cowden Mayer, PharmD, [facility]  Oncology Clinical Pharmacist  Phone [phone]    8/9/2023  14:04 EDT

## 2023-08-10 ENCOUNTER — TELEPHONE (OUTPATIENT)
Dept: RADIATION ONCOLOGY | Facility: HOSPITAL | Age: 73
End: 2023-08-10
Payer: MEDICARE

## 2023-08-10 ENCOUNTER — HOSPITAL ENCOUNTER (OUTPATIENT)
Dept: RADIATION ONCOLOGY | Facility: HOSPITAL | Age: 73
Setting detail: RADIATION/ONCOLOGY SERIES
Discharge: HOME | End: 2023-08-10
Payer: MEDICARE

## 2023-08-10 ENCOUNTER — CLINICAL SUPPORT (OUTPATIENT)
Dept: RADIATION ONCOLOGY | Facility: HOSPITAL | Age: 73
End: 2023-08-10
Payer: MEDICARE

## 2023-08-10 VITALS
HEIGHT: 73 IN | WEIGHT: 196 LBS | RESPIRATION RATE: 20 BRPM | HEART RATE: 67 BPM | TEMPERATURE: 97.4 F | BODY MASS INDEX: 25.98 KG/M2 | SYSTOLIC BLOOD PRESSURE: 133 MMHG | DIASTOLIC BLOOD PRESSURE: 65 MMHG | OXYGEN SATURATION: 100 %

## 2023-08-10 DIAGNOSIS — C79.51 MALIGNANT NEOPLASM METASTATIC TO BONE: Primary | ICD-10-CM

## 2023-08-10 PROCEDURE — A9606 RADIUM RA223 DICHLORIDE THER: HCPCS | Performed by: RADIOLOGY

## 2023-08-10 PROCEDURE — 79101 NUCLEAR RX IV ADMIN: CPT | Performed by: RADIOLOGY

## 2023-08-10 PROCEDURE — 77300 RADIATION THERAPY DOSE PLAN: CPT | Performed by: RADIOLOGY

## 2023-08-10 RX ORDER — ABIRATERONE ACETATE 250 MG/1
1000 TABLET ORAL DAILY
Qty: 120 TABLET | Refills: 11 | Status: SHIPPED | OUTPATIENT
Start: 2023-08-13

## 2023-08-10 NOTE — PROGRESS NOTES
The PA was approved, but the copay was almost $900 at [facility].  I have sent to [employer] to see if they can provide copay assistance.  If not, then we'll send it to [facility] pharmacy.    I reviewed Mary Jo Dubon's note and agree with her clinical assessment.    Drug: Abiraterone acetate  Strength: 250 mg tablets  Directions: 4 tablets by mouth daily on an empty stomach  Quantity: 120 tablets  Refills: 11    Halima Quintana, PharmD, [facility]  Oncology Clinical Pharmacist   Phone: [phone]    8/10/23  9:56 EDT

## 2023-08-10 NOTE — PROGRESS NOTES
SPECIAL TREATMENT PROCEDURE NOTE  INFUSION OF RADIONUCLIDE RADIUM-223    PATIENT:                                                      Madi Blanco  MEDICAL RECORD #:                        [id number]  :                                                          1950  DATE OF PROCEDURE:                       8/10/2023   DIAGNOSIS:     Bone Metastases  STAGE:     IV (M1)    PROCEDURE:  Injection of Radium-223 dichloride (Xofigo) 6 of 6 planned.    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  Radionuclide therapy is indicated for palliation.  Current pain 0/10.  The inherent complexity of this case, based on multi-modality coordination of treatment and the interdisciplinary coordination of care for this patient justifies a special treatment procedure.  Informed consent was obtained and is documented in the chart.    LABORATORY:  Pre-injection CBC from   Lab Results   Component Value Date    WBC 2.5 (L) 2023    HGB 11.7 (L) 2023    HCT 34.0 (L) 2023     (H) 2023     2023     showed adequate values for radionuclide therapy.   This SmartLink has not been configured with any valid records.   .  Alkaline Phospatase   Lab Results   Component Value Date    ALKPHOS 80 2023    ALKPHOS 129 (H) 2023   .  PSA 22.2 ng/ml on 8/3/2023    PROCEDURE DESCRIPTION:  Radium-223 (Xofigo) was prescribed at 1.49 æCi/Kg.  We received a dose of 137.66 æCi in 5.33 mL.  Special dosimetry assay performed at [facility] confirmed 137 æCi dosing.  After adequate hydration, the entire volume was manually infused through slow intravenous push over 2 minutes with subsequent IV hydration to flush the line.  Postinfusion assay showed residual 3 æCi resulting in total infusion activity 134 æCi.    COMPLICATIONS:  None.  The patient tolerated the procedure well with no  apparent side effects.    DISPOSITION:   Our physicist surveyed the room and no residual radioactivity was present.  All supplies were disposed of appropriately.  Patient was discharged to home with radiation precautions and instructions.  Follow-up will be with repeat CBC in 3-4 weeks and telehealth follow-up in 4 weeks.

## 2023-08-10 NOTE — TELEPHONE ENCOUNTER
Patient arrived prior to his 13:00 pm appointment and was placed in Exam Room #5; VS obtained; #20 gauge IV was placed in right AC with positive blood return; IV tubing was primed with NS and 3-way stop-cock attached to IV; Dr. Haines and Argenis Smith at patient chair side for Xofigo injection; injection completed; patient tolerated well overall; received 450 mls NS; IV was d/c'd; patient completion paperwork and education completed; instructions for follow-up appointment with Bailee and labs drawn prior.

## 2023-08-11 ENCOUNTER — TELEPHONE (OUTPATIENT)
Dept: ONCOLOGY | Facility: CLINIC | Age: 73
End: 2023-08-11

## 2023-08-11 NOTE — TELEPHONE ENCOUNTER
Caller: Madi Blanco    Relationship: Self    Best call back number: [phone]      What was the call regarding: PATIENT STATES DR TRAVIS WAS PUTTING HIM ON A NEW MEDICATION CALLED ZYTIGA AND HE HAS BEEN GETTING CALLS FROM A [facility] AND WANTED TO MAKE SURE THAT IS WHAT ITS REGARDING    PLEASE CALL PATIENT TO ADVISE

## 2023-08-16 ENCOUNTER — HOSPITAL ENCOUNTER (OUTPATIENT)
Dept: ONCOLOGY | Facility: HOSPITAL | Age: 73
Discharge: HOME | End: 2023-08-16
Payer: MEDICARE

## 2023-08-16 ENCOUNTER — SPECIALTY PHARMACY (OUTPATIENT)
Dept: ONCOLOGY | Facility: HOSPITAL | Age: 73
End: 2023-08-16
Payer: MEDICARE

## 2023-08-16 DIAGNOSIS — C79.51 MALIGNANT NEOPLASM METASTATIC TO BONE: ICD-10-CM

## 2023-08-16 DIAGNOSIS — C61 PROSTATE CANCER: Primary | ICD-10-CM

## 2023-08-16 RX ORDER — FEXOFENADINE HCL AND PSEUDOEPHEDRINE HCI 180; 240 MG/1; MG/1
1 TABLET, EXTENDED RELEASE ORAL DAILY PRN
COMMUNITY

## 2023-08-16 NOTE — PROGRESS NOTES
Specialty Pharmacy Patient Management Program  Oncology Initial Assessment       Madi Blanco is a 73 y.o. male with metastatic castration resistant prostate cancer seen by an Oncology provider and enrolled in the Oncology Patient Management program offered by [facility].  An initial outreach was conducted, including assessment of therapy appropriateness and specialty medication education for Zytiga (abiraterone) and prednisone. The patient was introduced to services offered by [facility], including: regular assessments, refill coordination, curbside pick-up or mail order delivery options, prior authorization maintenance, and financial assistance programs as applicable. The patient was also provided with contact information for the pharmacy team.     Regimen:   Zytiga (abiraterone) 250mg tablets - take 1000mg (4 tablets) by mouth daily, on an empty stomach.   Prednisone 5mg tablets - take 5mg (1 tablet) by mouth twice daily, with food.    Start date of oral specialty medication:  8/17/23    Relevant Past Medical History, Comorbidities, and Vaccines  Relevant medical history and concomitant health conditions were discussed with the patient. The patient's chart has been reviewed for relevant past medical history and comorbid health conditions and updated as necessary.  Vaccines are coordinated by the patient's oncologist and primary care provider.  Past Medical History:   Diagnosis Date    Benign prostatic hyperplasia 11/21/2021    Elevated PSA 11-20-21    Erectile dysfunction 04/22    History of radiation therapy 04/18/2022    Prostate/pelvis    Hypertension 12/2/22    Blood pressure started getting high after first of year    Prostate cancer      Social History     Socioeconomic History    Marital status:    Tobacco Use    Smoking status: Former     Packs/day: 0.50     Years: 10.00     Pack years: 5.00     Types: Cigarettes     Start date: 1/1/1967     Quit  date: 1977     Years since quittin.6     Passive exposure: Past    Smokeless tobacco: Never   Vaping Use    Vaping Use: Never used   Substance and Sexual Activity    Alcohol use: Yes     Alcohol/week: 14.0 standard drinks     Types: 14 Cans of beer per week     Comment: 2 per day    Drug use: Never    Sexual activity: Not Currently     Partners: Female     Birth control/protection: None     Allergies  Known allergies and reactions were discussed with the patient. The patient's chart has been reviewed for allergy information and updated as necessary.   Allergies   Allergen Reactions    Penicillins Hives      Current Medication List  This medication list has been reviewed with the patient and evaluated for any interactions or necessary modifications/recommendations, and updated to include all prescription medications, OTC medications, and supplements the patient is currently taking.  This list reflects what is contained in the patient's profile, which has also been marked as reviewed to communicate to other providers it is the most up to date version of the patient's current medication therapy.   Prior to Admission medications    Medication Sig Start Date End Date Taking? Authorizing Provider   amLODIPine-benazepril (LOTREL) 5-40 MG per capsule Take 1 capsule by mouth Daily. 3/2/23   Bharathi Bryan MD   tamsulosin (FLOMAX) 0.4 MG capsule 24 hr capsule Take 1 capsule by mouth Every Night. 3/2/23   Bharathi Bryan MD   Apalutamide 60 MG tablet Take 4 tablets by mouth Daily. 3/2/23 8/9/23  Bharathi Bryan MD   Fexofenadine- pseudoephedrine (ALLEGRA-D) tablet Take by mouth Daily as needed for allergies, congestion.       Delaney-Greenwood tablet Take by mouth Daily as needed for allergies.         Drug Interactions  Reviewed concomitant medications, allergies, labs, comorbidities/medical history, and immunization history.   Drug-drug interactions noted and discussed during  education:  Zytiga may increase Flomax concentrations, so advised the patient to monitor his blood pressure and notify the clinic of any signs of hypotension or orthostasis. Note that he is currently on an anti-hypertensive (Lotrel), and Zytiga may actually increase blood pressure. Patient acknowledged understanding.  Reminded the patient to let us know before making any changes or starting any new prescription or OTC medications so we can first assess drug interactions.  Drug-food interactions noted and discussed during education. Patient was instructed to avoid eating grapefruit and drinking grapefruit juice.    Recommended Medications Assessment  Bone Health (such as calcium/vitamin D, bisphosphonate, RANKL inhibitor) - Currently Taking. The patient is currently on Zometa (zoledronic acid) every 3 months  VTE prophylaxis - Not Indicated   Prophylactic antimicrobials - Not Indicated     Relevant Laboratory Values  Lab Results   Component Value Date    GLUCOSE 86 08/03/2023    CALCIUM 9.2 08/03/2023     08/03/2023    K 4.5 08/03/2023    CO2 23 08/03/2023     08/03/2023    BUN 15 08/03/2023    CREATININE 0.90 08/09/2023    EGFRIFNONA 70 10/22/2021    BCR 17 08/03/2023    ANIONGAP 10.0 02/17/2023     Lab Results   Component Value Date    WBC 2.5 (L) 08/03/2023    RBC 3.21 (L) 08/03/2023    HGB 11.7 (L) 08/03/2023    HCT 34.0 (L) 08/03/2023     (H) 08/03/2023    MCH 36.4 (H) 08/03/2023    MCHC 34.4 08/03/2023    RDW 14.9 08/03/2023    RDWSD 44.6 02/17/2023    MPV 10.4 02/17/2023     08/03/2023    NEUTRORELPCT 58 08/03/2023    LYMPHORELPCT 13 08/03/2023    MONORELPCT 16 08/03/2023    EOSRELPCT 10 08/03/2023    BASORELPCT 2 08/03/2023    AUTOIGPER 0.5 02/17/2023    NEUTROABS 1.4 08/03/2023    LYMPHSABS 0.3 (L) 08/03/2023    MONOSABS 0.4 08/03/2023    EOSABS 0.2 08/03/2023    BASOSABS 0.0 08/03/2023    AUTOIGNUM 0.02 02/17/2023    NRBC 0.0 02/17/2023     The above labs have been reviewed. No  dose adjustments are needed for the oral specialty medication(s) based on the labs.    Initial Education Provided for Specialty Medication  The patient has been provided with the following education. All questions and concerns have been addressed prior to the patient receiving the medication, and the patient has verbalized understanding of the education and any materials provided.  Additional patient education shall be provided and documented upon request by the patient, provider or payer.      Provided patient with:   Chemo calendar to help improve adherence, education sheets about the medication, 24-hour clinic phone number and my contact information and instructions to call should additional questions arise.     Medication Education Sheets Provided: (select all that apply)  Oral Specialty Medication: Zytiga (abiraterone acetate)  Steroid: Prednisone    Other Education Sheets Provided: (select all that apply)  Adherence, Blood Pressure Log, Constipation, Diarrhea, and Symptom Tracker Sheet and [facility] Information    TOPICS COMMENTS   Storage and Handling of Oral Specialty Medication Store in the original container, in a dry location out of direct sunlight, and out of reach of children or pets. Store at room temperature.  Discussed safe handling and what to do with any unused medication.   Administration of Oral Specialty Medication Take Zytiga on an empty stomach, one hour before or two hours after a meal. Take prednisone with food (breakfast, lunch).   Adherence to Oral Specialty Regimen and Handling Missed Doses Patient is likely to have good treatment adherence; reinforced the importance of adherence. Reviewed how to address missed doses and encouraged the patient to let us know of any missed doses.   Nutrition and Appetite Changes:  importance of maintaining healthy diet & weight, ways to manage to improve intake, dietary consult, exercise regimen, electrolyte and/or blood glucose abnormalities Increased Blood  Sugar: will monitor through bi-monthly labs with therapy initiation. Patient will contact his primary care provider for management recommendations if blood glucose values start trending upward. Electrolyte Abnormalities: explained that the oncology therapy may lead to abnormalities in electrolytes, specifically increased sodium and decreased potassium. Lipid Panel Abnormalities:  explained that the oncology therapy may lead to abnormalities in lipid values, specifically increased triglycerides. Dietician consult placed for further education.   Diarrhea: causes, s/s of dehydration, ways to manage, dietary changes, when to call MD Discussed the risk of diarrhea. Instructed patient on use OTC loperamide with diarrhea onset, but emphasized the importance of calling the clinic if 4-6 episodes in 24 hours not relieved by OTC loperamide.   Constipation: causes, ways to manage, dietary changes, when to call MD Provided supplementary handout with instructions for use of docusate and other OTC therapies to manage constipation.  Patient was instructed to call us if medications aren't working.   Nausea/Vomiting: cause, use of antiemetics, dietary changes, when to call MD Emetic risk: Low-Minimal  PRN home meds: Ondansetron 8mg PO TID PRN N/V    Instructed the patient to take a dose of the PRN medication at the first onset of nausea and if it's not working to call us for additional medications.  Also provided non-drug measures to mitigate nausea.   Pain: causes, ways to manage Discussed muscle and joint aches/pains with therapy, and recommended the use of OTC pain relief with ibuprofen or acetaminophen if needed.   Organ Toxicities: cause, s/s, need for diagnostic tests, labs, when to notify MD Discussed potential effects on organ systems, monitoring, diagnostic tests, labs, and when to notify the MD. Discussed the signs/symptoms of the following:  adrenal gland impairment and hepatotoxicity. Patient aware of the importance of  concurrent prednisone.   Miscellaneous Financial Issues: None -- patient eligible for copay assistance through [email or web address] pharmacy, with $0 copay.  Lab Draws: On days 1 and 15 of cycles 1-3; only on day 1 of each cycle thereafter  Fluid Retention: Explained the signs/symptoms of fluid retention around ankles, feet, and possibly in the hands.  Reviewed strategies to minimize this and recommended that the patient call the clinic if he gains 5 or more pounds in 1 week or experiences shortness of breath without exertion.    Hot flashes: Patient notes a history of hot flashes, currently well-controlled.   Infertility and Sexuality:  causes, fertility preservation options, sexuality changes, ways to manage, importance of birth control Oral Oncology Therapy: Reviewed safe sex practices and the importance of minimizing exposure to body fluids while on oral oncology therapy. The patient is not sexually active with a woman of childbearing potential.   Home Care: how to manage bodily fluids Counseled on management of soiled linens and proper flush technique.  Discussed how to manage all the side effects at home and advised when to contact the MD office.     Adherence and Self-Administration  Barriers to Patient Adherence and/or Self-Administration: None  Methods for Supporting Patient Adherence and/or Self-Administration: dosing calendar  Expected duration of therapy: Until disease progression or intolerable toxicity    Goals of Therapy  Patient Goals of Therapy:   Consistently take medications as prescribed  Patient will adhere to medication regimen  Patient will report any medication side effects to healthcare provider  Clinical Goals:    Goals        Specialty Pharmacy General Goal      Clinical goal/therapeutic target: stable or decreasing PSA and disease control, per the recent oncology clinic notes and labs.   Lab Results   Component Value Date    PSA 22.2 (H) 08/03/2023    PSA 20.1 (H) 07/10/2023    PSA 19.6 (H) 06/08/2023          Support patient understanding of medication regimen  Ensure patient knows the pharmacy contact information  Schedule regular follow-up to monitor the treatment serious adverse events  Schedule regular follow-up to confirm medication adherence  Schedule regular follow-up to monitor disease progression or stability    Additional Plans, Therapy Recommendations or Therapy Problems to Be Addressed: Patient has received Zytiga from [facility].  He will  his prednisone rx this afternoon and plan to start therapy tomorrow AM, 8/17/23.    Attestation  I attest that the initiated specialty medication(s) are appropriate for the patient based on my assessment.  If the prescribed therapy is at any point deemed not appropriate based on the current or future assessments, a consultation will be initiated with the patient's specialty care provider to determine the best course of action. The revised plan of therapy will be documented along with any additional patient education provided.     Ann Cowden Mayer, PharmD, [facility]  Oncology Clinical Pharmacist  Phone [phone]    Date and Time: 8/16/2023  10:51 EDT

## 2023-08-16 NOTE — PROGRESS NOTES
Specialty Pharmacy Note     Zytiga was received on 08/12/23 from [facility] specialty pharmacy. Patient will begin therapy on 08/17/23.           Migel Bautista CPhT  Specialty Pharmacy Technician

## 2023-08-17 ENCOUNTER — DOCUMENTATION (OUTPATIENT)
Dept: NUTRITION | Facility: HOSPITAL | Age: 73
End: 2023-08-17
Payer: MEDICARE

## 2023-08-17 RX ORDER — AMLODIPINE AND BENAZEPRIL HYDROCHLORIDE 5; 40 MG/1; MG/1
CAPSULE ORAL
Qty: 90 CAPSULE | Refills: 1 | Status: SHIPPED | OUTPATIENT
Start: 2023-08-17

## 2023-08-17 NOTE — PROGRESS NOTES
ONC Nutrition    Diagnosis:  Stage IIIA (cT2c, cN0, cM0 prostate cancer / high risk    12/31/22 PET -widespread bony metastatic disease    Radiation:  IMRT radiotherapy with concurrent androgen ablation / 70 Quintero delivered in 28 fractions over 5-1/2 weeks.to the prostate gland and nodes - completed 4/18/22      Hormonal Therapy 2/16/22     Lupron 45 mg Q 6M on  2/16/22 and 8/17/22  Starts Lupron 22.5 mg Q3M on 2/22/2     Hormonal Therapy 1/18/23     Apalutamide        Treatment 2/22/23     Zometa Q12W for Bone Metastasis     Chemotherapy:  Abiraterone / Prednisone - Start date 8/14/23    Weight 196 lbs /  weight stable range past 15 months    Patient is not identified to be at nutrition risk.    Referral received from Mary Jo Dubon PharmD regarding sleep issues which patient thinks might be related to his diet.    Phone call to patient; no answer; VM left requesting return phone call.

## 2023-08-24 ENCOUNTER — DOCUMENTATION (OUTPATIENT)
Dept: NUTRITION | Facility: HOSPITAL | Age: 73
End: 2023-08-24
Payer: MEDICARE

## 2023-08-24 NOTE — PROGRESS NOTES
"ONC Nutrition    Diagnosis: Stage IV castration resistant prostate cancer with bone mets    Treatment Plan: Zytiga (abiraterone acetate) + prednisone + Eligard (SQ leuprolide) / Zometa for bone mets    Radiation: 8/10/23 - Injection of Radium-223 dichloride (Xofigo) 6 of 6 planned     Weight 196 lbs    Referral received from Mary Jo Dubon, HosseinD.  Patient noting issues with sleep and thinks that it may be related to his diet.    Phone consultation with patient.  He states that his trouble sleeping is related to the frequency in which he has to get up during the night and the frustration that he then only produces a small amount of dribble.    Reviewed typical intake which indicates that he and his wife only cook at home 2x per week.  For the meals that are not prepared at home, he is eating an increased amount high sodium foods including bologna sandwiches, flatbread pizzas, fried chicken, nachos & salsa, mexican foods. Very scant intake of fruits and vegetables.  He typically drinks 2-3 cups of coffee in the morning and the rest of the day, he drinks water that is sometimes mixed with a small amount of OJ for flavor. Only a rare soft drink.    Discussed and advised patient as follows; limit caffeine, restrict beverage intake to only water in the latter half of the day, no beverage intake for at least an hour prior to bedtime, limit sodium intake which may be contributing to fluid retention.  Provided additional tips for improving overall quality of his diet and mailed patient education material \"Health and Wellness: Living with Prostate Cancer- Diet and Lifestyle Recommendations\"    He has been taking Flomax for approximately 3 years, but feels that it is ineffective.  Advised and strongly encouraged patient to contact his urologist or PCP (who prescribed the Flomax for him) to discuss effectiveness and possible change to another drug which might improve the flow and urgency.      Patient encouraged to contact for " additional questions.

## 2023-09-05 ENCOUNTER — CLINICAL SUPPORT (OUTPATIENT)
Dept: FAMILY MEDICINE CLINIC | Facility: CLINIC | Age: 73
End: 2023-09-05
Payer: MEDICARE

## 2023-09-05 DIAGNOSIS — C79.51 MALIGNANT NEOPLASM METASTATIC TO BONE: ICD-10-CM

## 2023-09-05 DIAGNOSIS — C61 PROSTATE CANCER: Primary | ICD-10-CM

## 2023-09-05 DIAGNOSIS — N40.0 BENIGN PROSTATIC HYPERPLASIA WITHOUT LOWER URINARY TRACT SYMPTOMS: ICD-10-CM

## 2023-09-05 PROCEDURE — 36415 COLL VENOUS BLD VENIPUNCTURE: CPT | Performed by: NURSE PRACTITIONER

## 2023-09-05 NOTE — PROGRESS NOTES
Venipuncture Blood Specimen Collection  Venipuncture performed in right arm by Nikole Villalobos MA with good hemostasis. Patient tolerated the procedure well without complications.   09/05/23   Nikole Villalobos MA

## 2023-09-06 LAB
ALBUMIN SERPL-MCNC: 4.3 G/DL (ref 3.8–4.8)
ALBUMIN/GLOB SERPL: 2.3 {RATIO} (ref 1.2–2.2)
ALP SERPL-CCNC: 76 IU/L (ref 44–121)
ALT SERPL-CCNC: 16 IU/L (ref 0–44)
AST SERPL-CCNC: 17 IU/L (ref 0–40)
BASOPHILS # BLD AUTO: 0 X10E3/UL (ref 0–0.2)
BASOPHILS NFR BLD AUTO: 2 %
BILIRUB SERPL-MCNC: 0.4 MG/DL (ref 0–1.2)
BUN SERPL-MCNC: 13 MG/DL (ref 8–27)
BUN/CREAT SERPL: 13 (ref 10–24)
CALCIUM SERPL-MCNC: 9.3 MG/DL (ref 8.6–10.2)
CHLORIDE SERPL-SCNC: 102 MMOL/L (ref 96–106)
CO2 SERPL-SCNC: 25 MMOL/L (ref 20–29)
CREAT SERPL-MCNC: 0.98 MG/DL (ref 0.76–1.27)
EGFRCR SERPLBLD CKD-EPI 2021: 82 ML/MIN/1.73
EOSINOPHIL # BLD AUTO: 0.2 X10E3/UL (ref 0–0.4)
EOSINOPHIL NFR BLD AUTO: 7 %
ERYTHROCYTE [DISTWIDTH] IN BLOOD BY AUTOMATED COUNT: 14.2 % (ref 11.6–15.4)
GLOBULIN SER CALC-MCNC: 1.9 G/DL (ref 1.5–4.5)
GLUCOSE SERPL-MCNC: 70 MG/DL (ref 70–99)
HCT VFR BLD AUTO: 34.1 % (ref 37.5–51)
HGB BLD-MCNC: 11.9 G/DL (ref 13–17.7)
IMM GRANULOCYTES # BLD AUTO: 0 X10E3/UL (ref 0–0.1)
IMM GRANULOCYTES NFR BLD AUTO: 2 %
LYMPHOCYTES # BLD AUTO: 0.4 X10E3/UL (ref 0.7–3.1)
LYMPHOCYTES NFR BLD AUTO: 14 %
MAGNESIUM SERPL-MCNC: 2.4 MG/DL (ref 1.6–2.3)
MCH RBC QN AUTO: 37.7 PG (ref 26.6–33)
MCHC RBC AUTO-ENTMCNC: 34.9 G/DL (ref 31.5–35.7)
MCV RBC AUTO: 108 FL (ref 79–97)
MONOCYTES # BLD AUTO: 0.4 X10E3/UL (ref 0.1–0.9)
MONOCYTES NFR BLD AUTO: 15 %
NEUTROPHILS # BLD AUTO: 1.6 X10E3/UL (ref 1.4–7)
NEUTROPHILS NFR BLD AUTO: 60 %
PHOSPHATE SERPL-MCNC: 3.2 MG/DL (ref 2.8–4.1)
PLATELET # BLD AUTO: 139 X10E3/UL (ref 150–450)
POTASSIUM SERPL-SCNC: 3.9 MMOL/L (ref 3.5–5.2)
PROT SERPL-MCNC: 6.2 G/DL (ref 6–8.5)
PSA SERPL-MCNC: 27.5 NG/ML (ref 0–4)
RBC # BLD AUTO: 3.16 X10E6/UL (ref 4.14–5.8)
SODIUM SERPL-SCNC: 142 MMOL/L (ref 134–144)
TESTOST SERPL-MCNC: <3 NG/DL (ref 264–916)
WBC # BLD AUTO: 2.6 X10E3/UL (ref 3.4–10.8)

## 2023-09-07 ENCOUNTER — OFFICE VISIT (OUTPATIENT)
Dept: RADIATION ONCOLOGY | Facility: HOSPITAL | Age: 73
End: 2023-09-07
Payer: MEDICARE

## 2023-09-07 ENCOUNTER — HOSPITAL ENCOUNTER (OUTPATIENT)
Dept: RADIATION ONCOLOGY | Facility: HOSPITAL | Age: 73
Setting detail: RADIATION/ONCOLOGY SERIES
Discharge: HOME | End: 2023-09-07
Payer: MEDICARE

## 2023-09-07 DIAGNOSIS — C79.51 MALIGNANT NEOPLASM METASTATIC TO BONE: Primary | ICD-10-CM

## 2023-09-07 NOTE — PROGRESS NOTES
TELEMEDICINE FOLLOW UP NOTE    PATIENT:                                                      Madi Blanco  MEDICAL RECORD #:                        [id number]  :                                                          1950  COMPLETION DATE:   8/10/2023  DIAGNOSIS:     Prostate cancer  - Stage IIIA (cT2c, cN0, cM0, PSA: 53.4, Grade Group: 3)      This visit has been converted to a telehealth virtual visit, the patient's preferred method for today's follow-up.  Total time of discussion was 10 minutes.  The patient has given verbal consent.      BRIEF HISTORY:    Initial follow-up visit.  He was previously treated with stereotactic body radiotherapy and androgen ablation for clinically localized, high risk prostate cancer diagnosed in .  PSA has subsequently risen up to a value of 35.1 in 2023.  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  He was started on apalutamide with Lupron and Zometa earlier this year.  The patient was referred to our clinic for consideration of radionucleotide therapy given multiple symptomatic bony metastases.  He underwent palliative radionucleotide therapy with 6 monthly injections of radium-223 (Xofigo), which he completed 8/10/2023.  He tolerated treatment well and blood counts remained stable/adequate throughout treatment.  At this point, he reports pain has completely resolved and he denies focal areas of pain or discomfort.  He does note some increased fatigue but continues to remain active.  He was cleaning his pool at the time of this encounter.  He reports some ongoing hot flashes which he relates to androgen ablation.  He was recently switched from apalutamide to Zytiga with prednisone, which he started about 3 weeks ago.  He is tolerating new regimen well without reported issue.  Repeat PSA drawn a few days ago did increase from 22.2 to  27.5.  The patient presents today via telemedicine following recent lab work and for initial follow-up visit.        MEDICATIONS: Medication reconciliation for the patient was reviewed and confirmed in the electronic medical record.    Review of Systems   Constitutional:  Positive for fatigue.   Endocrine: Positive for hot flashes.   All other systems reviewed and are negative.    KPS 90%          Physical Exam  Pulmonary:      Respirations even, unlabored. No audible wheezing or cough.  Neurological:      A+Ox4, conversant, answers questions appropriately.  Psychiatric:     Judgement, affect, and decision-making WNL.    Limited physical exam as visit was conducted remotely via telephone.          LABORATORY:  Component  Ref Range & Units 2 d ago  (9/5/23) 1 mo ago  (8/3/23) 1 mo ago  (7/10/23) 3 mo ago  (6/8/23) 3 mo ago  (5/11/23) 4 mo ago  (4/13/23) 6 mo ago  (2/17/23)   WBC  3.4 - 10.8 x10E3/uL 2.6 Low  2.5 Low  2.5 Low  2.4 Low  2.0 Low  2.0 Low  4.26 R     Hemoglobin  13.0 - 17.7 g/dL 11.9 Low  11.7 Low  12.4 Low  13.8 13.7 14.6 14.9   Hematocrit  37.5 - 51.0 % 34.1 Low  34.0 Low  36.0 Low  40.2 40.5 41.5 43.7     Platelets  150 - 450 x10E3/uL 139 Low  153 169 161 141 Low  139 Low  213 R     Alkaline Phosphatase  44 - 121 IU/L 76  80 73 77  79         Component  Ref Range & Units 2 d ago  (9/5/23) 1 mo ago  (8/3/23) 1 mo ago  (7/10/23) 3 mo ago  (6/8/23) 3 mo ago  (5/11/23) 4 mo ago  (4/13/23) 6 mo ago  (2/17/23)   PSA  0.0 - 4.0 ng/mL 27.5 High  22.2 High  CM 20.1 High  CM 19.6 High  CM 21.3 High  CM 22.0 High  CM 28.900 High        Component  Ref Range & Units 2 d ago 8 mo ago   Testosterone, Total  264 - 916 ng/dL <3 Low  <2.50 Low  R         The following portions of the patient's history were reviewed and updated as appropriate: allergies, current medications, past family history, past medical history, past social history, past surgical history and problem list.         Diagnoses and all orders for this  visit:    1. Malignant neoplasm metastatic to bone (Primary)         IMPRESSION:  Castrate resistant metastatic prostate cancer with diffuse, symptomatic osseous involvement.  He has had recent progression of disease with rising PSA and worsening bone metastases without evidence of organ involvement on most recent imaging studies.  He completed 6 monthly injections of radionucleotide therapy with Xofigo for palliation of pain.  He tolerated treatment well and from a symptom standpoint, had an excellent clinical response with reported resolution of pain symptoms.  PSA has continued to increase with most recent level of 27.5 ng/mL, up from 22.2 ng/mL one month prior.  We did discuss that in this timeframe, apalutamide was discontinued due to inadequate response and he began new regimen of Zytiga + prednisone.  Hopefully PSA will respond better.  We discussed that if the PSA continues to rise or if he develops symptomatic/painful sites, then repeat imaging would be indicated.  We also discussed that he would be a candidate for localized external beam radiotherapy in the future or other palliative measures, if needed.  The patient is not currently on cytotoxic chemotherapy.      RECOMMENDATIONS:  Madi Blanco will continue oncologic care under the care of Dr. Ugalde.  At this point, no present role/indication for radiotherapy.  We would be happy to follow-up at any point in the future as needed, certainly should clinical/radiographic findings warrant, such as painful or at-risk bony lesions.      Return if symptoms worsen or fail to improve, for Office Visit.    LYNNETTE Casanova    I spent a total of 20 minutes on today's visit, with more than 10 minutes in virtual communication with the patient via telephone, and the remainder of the time spent in reviewing the relevant history, records, available imaging, and for documentation.

## 2023-09-12 NOTE — RADIATION COMPLETION NOTES
RADIATION ONCOLOGY COMPLETION NOTE    PATIENT:   Madi Blanco  MEDICAL RECORD:  [id number]  :    1950  COMPLETION DATE: 8/10/2023  DIAGNOSIS:   Prostate cancer  Stage IIIA (cT2c, cN0, cM0, PSA: 53.4, Grade Group: 3)      BRIEF HISTORY:  This 73 y.o. patient completed radiotherapy.  He has castrate resistant metastatic prostate cancer with primarily bone only disease.         TREATMENT COURSE:  He received 6 radionuclide infusions of Radium 223, approximately every 4 weeks, completing the Xofigo protocol.    DATES OF TREATMENT: 3/23/2023, 2023, 2023, 6/15/2023, 2023, and 8/10/2023    TOLERANCE:   no unexpected difficulties     STATUS:  partial response in terms of pain control and normalization of alkaline phosphatase decreasing from 129 on 2023 to 80 on 8/3/2023.  CBC values remain adequate.  PSA remained fairly stable, with last value 22.2 ng/ml.    DISPOSITION:  Follow up in Radiation Oncology in approximately 1 month.        Remigio Haines MD

## 2023-09-25 ENCOUNTER — TELEPHONE (OUTPATIENT)
Dept: ONCOLOGY | Facility: CLINIC | Age: 73
End: 2023-09-25

## 2023-09-25 DIAGNOSIS — R97.20 ELEVATED PROSTATE SPECIFIC ANTIGEN (PSA): Primary | ICD-10-CM

## 2023-09-25 DIAGNOSIS — C79.51 MALIGNANT NEOPLASM METASTATIC TO BONE: ICD-10-CM

## 2023-09-25 DIAGNOSIS — C61 PROSTATE CANCER: ICD-10-CM

## 2023-09-25 NOTE — TELEPHONE ENCOUNTER
Returned call to patient after discussing with Dr. Ugalde. Informing patient that referral has been entered and will be sent over to [facility].

## 2023-10-26 ENCOUNTER — TELEPHONE (OUTPATIENT)
Dept: ONCOLOGY | Facility: CLINIC | Age: 73
End: 2023-10-26
Payer: MEDICARE

## 2023-10-26 NOTE — TELEPHONE ENCOUNTER
Caller: Madi Blanco    Relationship: Self    Best call back number: [phone]    What was the call regarding: MADI CALLED TO CANCEL HIS APPOINTMENTS FOR 11/01. HE HAS TRANSFERRED HIS CARE TO .

## 2023-10-26 NOTE — TELEPHONE ENCOUNTER
RETURNED CALL TO PATIENT AND INFORMED APPTS HAVE BEEN CANCELLED. PATIENT STATED HIS CARE HAS BEEN TRANSFERRED TO [facility]. PLEASE ADVISE.

## 2023-10-27 ENCOUNTER — SPECIALTY PHARMACY (OUTPATIENT)
Dept: ONCOLOGY | Facility: HOSPITAL | Age: 73
End: 2023-10-27
Payer: MEDICARE

## 2023-12-05 ENCOUNTER — TELEPHONE (OUTPATIENT)
Dept: UROLOGY | Facility: CLINIC | Age: 73
End: 2023-12-05
Payer: MEDICARE

## 2023-12-05 NOTE — TELEPHONE ENCOUNTER
Called to reschedule patient from 1/15 but patient is actually going to  now. Also very appreciative of the work [facility] has done for him though!

## 2024-04-17 RX ORDER — TAMSULOSIN HYDROCHLORIDE 0.4 MG/1
1 CAPSULE ORAL NIGHTLY
Qty: 90 CAPSULE | Refills: 3 | Status: SHIPPED | OUTPATIENT
Start: 2024-04-17